# Patient Record
Sex: MALE | Race: WHITE | NOT HISPANIC OR LATINO | Employment: UNEMPLOYED | URBAN - NONMETROPOLITAN AREA
[De-identification: names, ages, dates, MRNs, and addresses within clinical notes are randomized per-mention and may not be internally consistent; named-entity substitution may affect disease eponyms.]

---

## 2019-08-08 ENCOUNTER — APPOINTMENT (OUTPATIENT)
Dept: ULTRASOUND IMAGING | Facility: HOSPITAL | Age: 49
End: 2019-08-08

## 2019-08-08 ENCOUNTER — APPOINTMENT (OUTPATIENT)
Dept: MRI IMAGING | Facility: HOSPITAL | Age: 49
End: 2019-08-08

## 2019-08-08 ENCOUNTER — APPOINTMENT (OUTPATIENT)
Dept: CT IMAGING | Facility: HOSPITAL | Age: 49
End: 2019-08-08

## 2019-08-08 ENCOUNTER — APPOINTMENT (OUTPATIENT)
Dept: CARDIOLOGY | Facility: HOSPITAL | Age: 49
End: 2019-08-08

## 2019-08-08 ENCOUNTER — HOSPITAL ENCOUNTER (INPATIENT)
Facility: HOSPITAL | Age: 49
LOS: 5 days | Discharge: NURSING, REHAB OR LONG-TERM CARE | End: 2019-08-13
Attending: FAMILY MEDICINE | Admitting: FAMILY MEDICINE

## 2019-08-08 DIAGNOSIS — R41.89 COGNITIVE AND BEHAVIORAL CHANGES: Primary | ICD-10-CM

## 2019-08-08 DIAGNOSIS — Z74.09 IMPAIRED FUNCTIONAL MOBILITY, BALANCE, GAIT, AND ENDURANCE: ICD-10-CM

## 2019-08-08 DIAGNOSIS — Z78.9 IMPAIRED MOBILITY AND ADLS: ICD-10-CM

## 2019-08-08 DIAGNOSIS — R46.89 COGNITIVE AND BEHAVIORAL CHANGES: Primary | ICD-10-CM

## 2019-08-08 DIAGNOSIS — Z74.09 IMPAIRED MOBILITY AND ADLS: ICD-10-CM

## 2019-08-08 PROBLEM — G81.94 LEFT HEMIPARESIS (HCC): Status: ACTIVE | Noted: 2019-08-08

## 2019-08-08 PROBLEM — I10 BENIGN ESSENTIAL HTN: Status: ACTIVE | Noted: 2019-08-08

## 2019-08-08 PROBLEM — I63.9 CVA (CEREBRAL VASCULAR ACCIDENT) (HCC): Status: ACTIVE | Noted: 2019-08-08

## 2019-08-08 PROBLEM — E78.5 HLD (HYPERLIPIDEMIA): Status: ACTIVE | Noted: 2019-08-08

## 2019-08-08 PROBLEM — Z91.199 MEDICAL NON-COMPLIANCE: Status: ACTIVE | Noted: 2019-08-08

## 2019-08-08 PROBLEM — F10.20 ALCOHOL USE DISORDER, MODERATE, DEPENDENCE (HCC): Status: ACTIVE | Noted: 2019-08-08

## 2019-08-08 LAB
BH CV ECHO MEAS - AO MAX PG (FULL): 4.9 MMHG
BH CV ECHO MEAS - AO MAX PG: 8.2 MMHG
BH CV ECHO MEAS - AO MEAN PG (FULL): 3 MMHG
BH CV ECHO MEAS - AO MEAN PG: 5 MMHG
BH CV ECHO MEAS - AO ROOT AREA (BSA CORRECTED): 1.4
BH CV ECHO MEAS - AO ROOT AREA: 7.5 CM^2
BH CV ECHO MEAS - AO ROOT DIAM: 3.1 CM
BH CV ECHO MEAS - AO V2 MAX: 143 CM/SEC
BH CV ECHO MEAS - AO V2 MEAN: 111 CM/SEC
BH CV ECHO MEAS - AO V2 VTI: 33.3 CM
BH CV ECHO MEAS - AVA(I,A): 2.6 CM^2
BH CV ECHO MEAS - AVA(I,D): 2.6 CM^2
BH CV ECHO MEAS - AVA(V,A): 2.4 CM^2
BH CV ECHO MEAS - AVA(V,D): 2.4 CM^2
BH CV ECHO MEAS - BSA(HAYCOCK): 2.3 M^2
BH CV ECHO MEAS - BSA: 2.1 M^2
BH CV ECHO MEAS - BZI_BMI: 36.2 KILOGRAMS/M^2
BH CV ECHO MEAS - BZI_METRIC_HEIGHT: 170.2 CM
BH CV ECHO MEAS - BZI_METRIC_WEIGHT: 104.8 KG
BH CV ECHO MEAS - EDV(CUBED): 140.6 ML
BH CV ECHO MEAS - EDV(MOD-SP4): 199 ML
BH CV ECHO MEAS - EDV(TEICH): 129.5 ML
BH CV ECHO MEAS - EF(CUBED): 61.6 %
BH CV ECHO MEAS - EF(MOD-SP4): 59.8 %
BH CV ECHO MEAS - EF(TEICH): 52.8 %
BH CV ECHO MEAS - ESV(CUBED): 54 ML
BH CV ECHO MEAS - ESV(MOD-SP4): 79.9 ML
BH CV ECHO MEAS - ESV(TEICH): 61.2 ML
BH CV ECHO MEAS - FS: 27.3 %
BH CV ECHO MEAS - IVS/LVPW: 0.98
BH CV ECHO MEAS - IVSD: 1.6 CM
BH CV ECHO MEAS - LA DIMENSION: 4.9 CM
BH CV ECHO MEAS - LA/AO: 1.6
BH CV ECHO MEAS - LAT PEAK E' VEL: 4.4 CM/SEC
BH CV ECHO MEAS - LV DIASTOLIC VOL/BSA (35-75): 92.6 ML/M^2
BH CV ECHO MEAS - LV MASS(C)D: 378.5 GRAMS
BH CV ECHO MEAS - LV MASS(C)DI: 176.1 GRAMS/M^2
BH CV ECHO MEAS - LV MAX PG: 3.3 MMHG
BH CV ECHO MEAS - LV MEAN PG: 2 MMHG
BH CV ECHO MEAS - LV SYSTOLIC VOL/BSA (12-30): 37.2 ML/M^2
BH CV ECHO MEAS - LV V1 MAX: 90.9 CM/SEC
BH CV ECHO MEAS - LV V1 MEAN: 71.1 CM/SEC
BH CV ECHO MEAS - LV V1 VTI: 22.8 CM
BH CV ECHO MEAS - LVIDD: 5.2 CM
BH CV ECHO MEAS - LVIDS: 3.8 CM
BH CV ECHO MEAS - LVLD AP4: 8.4 CM
BH CV ECHO MEAS - LVLS AP4: 6.4 CM
BH CV ECHO MEAS - LVOT AREA (M): 3.8 CM^2
BH CV ECHO MEAS - LVOT AREA: 3.8 CM^2
BH CV ECHO MEAS - LVOT DIAM: 2.2 CM
BH CV ECHO MEAS - LVPWD: 1.6 CM
BH CV ECHO MEAS - MED PEAK E' VEL: 4.7 CM/SEC
BH CV ECHO MEAS - MR MAX PG: 113.2 MMHG
BH CV ECHO MEAS - MR MAX VEL: 532 CM/SEC
BH CV ECHO MEAS - MR MEAN PG: 80 MMHG
BH CV ECHO MEAS - MR MEAN VEL: 428 CM/SEC
BH CV ECHO MEAS - MR VTI: 227 CM
BH CV ECHO MEAS - MV A MAX VEL: 79.5 CM/SEC
BH CV ECHO MEAS - MV DEC SLOPE: 196 CM/SEC^2
BH CV ECHO MEAS - MV DEC TIME: 0.42 SEC
BH CV ECHO MEAS - MV E MAX VEL: 81.5 CM/SEC
BH CV ECHO MEAS - MV E/A: 1
BH CV ECHO MEAS - RAP SYSTOLE: 10 MMHG
BH CV ECHO MEAS - RVSP: 25.2 MMHG
BH CV ECHO MEAS - SI(AO): 116.7 ML/M^2
BH CV ECHO MEAS - SI(CUBED): 40.3 ML/M^2
BH CV ECHO MEAS - SI(LVOT): 40.3 ML/M^2
BH CV ECHO MEAS - SI(MOD-SP4): 55.4 ML/M^2
BH CV ECHO MEAS - SI(TEICH): 31.8 ML/M^2
BH CV ECHO MEAS - SV(AO): 251 ML
BH CV ECHO MEAS - SV(CUBED): 86.6 ML
BH CV ECHO MEAS - SV(LVOT): 86.7 ML
BH CV ECHO MEAS - SV(MOD-SP4): 119.1 ML
BH CV ECHO MEAS - SV(TEICH): 68.3 ML
BH CV ECHO MEAS - TR MAX VEL: 195 CM/SEC
BH CV ECHO MEASUREMENTS AVERAGE E/E' RATIO: 17.91
LEFT ATRIUM VOLUME INDEX: 41 ML/M2
LEFT ATRIUM VOLUME: 88 CM3
LV EF 2D ECHO EST: 55 %
MAXIMAL PREDICTED HEART RATE: 169 BPM
STRESS TARGET HR: 144 BPM

## 2019-08-08 PROCEDURE — 70551 MRI BRAIN STEM W/O DYE: CPT

## 2019-08-08 PROCEDURE — 25010000002 PERFLUTREN 6.52 MG/ML SUSPENSION: Performed by: FAMILY MEDICINE

## 2019-08-08 PROCEDURE — 93306 TTE W/DOPPLER COMPLETE: CPT

## 2019-08-08 PROCEDURE — 70498 CT ANGIOGRAPHY NECK: CPT

## 2019-08-08 PROCEDURE — 93306 TTE W/DOPPLER COMPLETE: CPT | Performed by: INTERNAL MEDICINE

## 2019-08-08 PROCEDURE — 25010000002 THIAMINE PER 100 MG: Performed by: FAMILY MEDICINE

## 2019-08-08 PROCEDURE — 70496 CT ANGIOGRAPHY HEAD: CPT

## 2019-08-08 PROCEDURE — 0 IOPAMIDOL PER 1 ML: Performed by: FAMILY MEDICINE

## 2019-08-08 RX ORDER — LANOLIN ALCOHOL/MO/W.PET/CERES
3 CREAM (GRAM) TOPICAL NIGHTLY PRN
Status: DISCONTINUED | OUTPATIENT
Start: 2019-08-08 | End: 2019-08-13 | Stop reason: HOSPADM

## 2019-08-08 RX ORDER — LORAZEPAM 2 MG/ML
2 INJECTION INTRAMUSCULAR
Status: DISCONTINUED | OUTPATIENT
Start: 2019-08-08 | End: 2019-08-13 | Stop reason: HOSPADM

## 2019-08-08 RX ORDER — ALUMINA, MAGNESIA, AND SIMETHICONE 2400; 2400; 240 MG/30ML; MG/30ML; MG/30ML
15 SUSPENSION ORAL EVERY 6 HOURS PRN
Status: DISCONTINUED | OUTPATIENT
Start: 2019-08-08 | End: 2019-08-13 | Stop reason: HOSPADM

## 2019-08-08 RX ORDER — LORAZEPAM 1 MG/1
1 TABLET ORAL
Status: DISCONTINUED | OUTPATIENT
Start: 2019-08-08 | End: 2019-08-13 | Stop reason: HOSPADM

## 2019-08-08 RX ORDER — LORAZEPAM 2 MG/ML
1 INJECTION INTRAMUSCULAR
Status: DISCONTINUED | OUTPATIENT
Start: 2019-08-08 | End: 2019-08-13 | Stop reason: HOSPADM

## 2019-08-08 RX ORDER — ASPIRIN 81 MG/1
81 TABLET, CHEWABLE ORAL DAILY
Status: DISCONTINUED | OUTPATIENT
Start: 2019-08-08 | End: 2019-08-13 | Stop reason: HOSPADM

## 2019-08-08 RX ORDER — ASPIRIN 300 MG/1
300 SUPPOSITORY RECTAL DAILY
Status: DISCONTINUED | OUTPATIENT
Start: 2019-08-08 | End: 2019-08-13 | Stop reason: HOSPADM

## 2019-08-08 RX ORDER — TRAMADOL HYDROCHLORIDE 50 MG/1
50 TABLET ORAL EVERY 6 HOURS PRN
Status: DISCONTINUED | OUTPATIENT
Start: 2019-08-08 | End: 2019-08-13 | Stop reason: HOSPADM

## 2019-08-08 RX ORDER — SODIUM CHLORIDE 0.9 % (FLUSH) 0.9 %
3-10 SYRINGE (ML) INJECTION AS NEEDED
Status: DISCONTINUED | OUTPATIENT
Start: 2019-08-08 | End: 2019-08-13 | Stop reason: HOSPADM

## 2019-08-08 RX ORDER — ACETAMINOPHEN 325 MG/1
650 TABLET ORAL EVERY 4 HOURS PRN
Status: DISCONTINUED | OUTPATIENT
Start: 2019-08-08 | End: 2019-08-13 | Stop reason: HOSPADM

## 2019-08-08 RX ORDER — ONDANSETRON 2 MG/ML
4 INJECTION INTRAMUSCULAR; INTRAVENOUS EVERY 6 HOURS PRN
Status: DISCONTINUED | OUTPATIENT
Start: 2019-08-08 | End: 2019-08-13 | Stop reason: HOSPADM

## 2019-08-08 RX ORDER — SODIUM CHLORIDE 9 MG/ML
75 INJECTION, SOLUTION INTRAVENOUS CONTINUOUS
Status: DISCONTINUED | OUTPATIENT
Start: 2019-08-08 | End: 2019-08-09

## 2019-08-08 RX ORDER — LABETALOL HYDROCHLORIDE 5 MG/ML
20 INJECTION, SOLUTION INTRAVENOUS
Status: DISCONTINUED | OUTPATIENT
Start: 2019-08-08 | End: 2019-08-13 | Stop reason: HOSPADM

## 2019-08-08 RX ORDER — LORAZEPAM 1 MG/1
2 TABLET ORAL
Status: DISCONTINUED | OUTPATIENT
Start: 2019-08-08 | End: 2019-08-13 | Stop reason: HOSPADM

## 2019-08-08 RX ORDER — SODIUM CHLORIDE 0.9 % (FLUSH) 0.9 %
3 SYRINGE (ML) INJECTION EVERY 12 HOURS SCHEDULED
Status: DISCONTINUED | OUTPATIENT
Start: 2019-08-08 | End: 2019-08-13 | Stop reason: HOSPADM

## 2019-08-08 RX ORDER — BISACODYL 5 MG/1
5 TABLET, DELAYED RELEASE ORAL DAILY PRN
Status: DISCONTINUED | OUTPATIENT
Start: 2019-08-08 | End: 2019-08-13 | Stop reason: HOSPADM

## 2019-08-08 RX ORDER — NICOTINE 21 MG/24HR
1 PATCH, TRANSDERMAL 24 HOURS TRANSDERMAL EVERY 24 HOURS
Status: DISCONTINUED | OUTPATIENT
Start: 2019-08-08 | End: 2019-08-13 | Stop reason: HOSPADM

## 2019-08-08 RX ORDER — ATORVASTATIN CALCIUM 40 MG/1
80 TABLET, FILM COATED ORAL NIGHTLY
Status: DISCONTINUED | OUTPATIENT
Start: 2019-08-08 | End: 2019-08-13 | Stop reason: HOSPADM

## 2019-08-08 RX ADMIN — ASPIRIN 81 MG 81 MG: 81 TABLET ORAL at 18:53

## 2019-08-08 RX ADMIN — IOPAMIDOL 142 ML: 755 INJECTION, SOLUTION INTRAVENOUS at 16:55

## 2019-08-08 RX ADMIN — THIAMINE HYDROCHLORIDE 100 MG: 100 INJECTION, SOLUTION INTRAMUSCULAR; INTRAVENOUS at 21:01

## 2019-08-08 RX ADMIN — DESMOPRESSIN ACETATE 80 MG: 0.2 TABLET ORAL at 20:59

## 2019-08-08 RX ADMIN — SODIUM CHLORIDE 500 ML: 9 INJECTION, SOLUTION INTRAVENOUS at 18:53

## 2019-08-08 RX ADMIN — PERFLUTREN 8.48 MG: 6.52 INJECTION, SUSPENSION INTRAVENOUS at 17:48

## 2019-08-08 RX ADMIN — NICOTINE 1 PATCH: 14 PATCH, EXTENDED RELEASE TRANSDERMAL at 20:58

## 2019-08-08 NOTE — PLAN OF CARE
Problem: Patient Care Overview  Goal: Plan of Care Review  Outcome: Ongoing (interventions implemented as appropriate)   08/08/19 0689   Coping/Psychosocial   Plan of Care Reviewed With patient   Plan of Care Review   Progress no change   OTHER   Outcome Summary Patient was a transfer from [place]. Stroke work up. A&O X4. NIH=6. NIH Q 4. CIWA=3. Sinus jacquelyn on tele. Up X 2 if necessary. MRI, CT, and echo today. , room air. Passed BSS, regular diet. Will continue to monitor.      Goal: Individualization and Mutuality  Outcome: Ongoing (interventions implemented as appropriate)    Goal: Discharge Needs Assessment  Outcome: Ongoing (interventions implemented as appropriate)    Goal: Interprofessional Rounds/Family Conf  Outcome: Ongoing (interventions implemented as appropriate)      Problem: Stroke (Ischemic) (Adult)  Goal: Signs and Symptoms of Listed Potential Problems Will be Absent, Minimized or Managed (Stroke)  Outcome: Ongoing (interventions implemented as appropriate)

## 2019-08-08 NOTE — CONSULTS
Neurology Consult Note    Patient:  Olman Vaca   YOB: 1970  MRN:  [id number]  Date of Admission:  8/8/2019  2:24 PM    Date: 8/9/2019    Referring Provider: Tay Collado DO     Reason for Consultation: CVA w R hemiparesis      History of present illness:     This is a 49 y.o. right handed male.with H/O hyperlipidemia, hypertension, smoker, evaluated for a stroke    Patient was sent from [facility] for onset of left sided weakness, and dizziness as well as headache    Patient reports that the day before yesterday while lying on the couch he had an abrupt onset of posterior headaches that radiated forward to his vertex.  This was associated with the dizziness which he described more as a lightheadedness and a feeling of near syncope.  He also noted incoordination he now that he his left arm was not working right because he could not reach for the glass.  He noted numbness in the front of his face and numbness in the left arm leg and trunk.  When he got up to walk he was incoordinated but could walk.  He ran into the wall on the left he did end up going back to bed and sleeping he got up yesterday morning at 6 in a.m. and noted that the symptoms were still there came into [facility].  The patient denies any vision loss.    Of note he is not taking any lipid-lowering agents for years.  He states that there is a discontinued medical card so he has not been able to get any of his medications filled for the past few months so has not been on any of his antihypertensives    He states he has had vision problems meaning that he needs glasses for a long time.  Today he states he still has a gait and balance that his left leg is not working right his left arm is weak and he continues to have numbness in the front of the face and the left side of his body.    Past Medical History:   Diagnosis Date   • Benign essential HTN 8/8/2019   • HLD (hyperlipidemia) 8/8/2019        Past Surgical History:   Procedure Laterality Date   • FEMORAL LYMPH NODE BIOPSY/EXCISON     • MOUTH SURGERY         Prior to Admission medications    Not on File--NONE       Hospital scheduled medications:     aspirin 81 mg Oral Daily   Or      aspirin 300 mg Rectal Daily   atorvastatin 80 mg Oral Nightly   nicotine 1 patch Transdermal Q24H   sodium chloride 3 mL Intravenous Q12H   thiamine (VITAMIN B1) IVPB 100 mg Intravenous Daily     Hospital PRN medications:  •  acetaminophen  •  aluminum-magnesium hydroxide-simethicone  •  bisacodyl  •  labetalol  •  LORazepam **OR** LORazepam **OR** LORazepam **OR** LORazepam **OR** LORazepam **OR** LORazepam  •  melatonin  •  ondansetron  •  sodium chloride  •  traMADol    No Known Allergies    Social History     Socioeconomic History   • Marital status: Single     Spouse name: Not on file   • Number of children: Not on file   • Years of education: Not on file   • Highest education level: Not on file   Tobacco Use   • Smoking status: Current Every Day Smoker     Packs/day: 1.00     Years: 42.00     Pack years: 42.00     Types: Cigarettes   • Smokeless tobacco: Never Used   Substance and Sexual Activity   • Alcohol use: Yes     Alcohol/week: 42.0 oz     Types: 70 Shots of liquor per week   • Drug use: Yes     Frequency: 7.0 times per week     Types: Marijuana   • Sexual activity: Not Currently     Family History   Problem Relation Age of Onset   • Hypertension Mother    • Cancer Mother    • Stroke Mother    • No Known Problems Father        Review of Systems  A 14 point review of systems was reviewed and was negative except for continued imbalance and left sided numbness and weakness.     Vital Signs   Temp:  [97.5 °F (36.4 °C)-98.3 °F (36.8 °C)] 98.3 °F (36.8 °C)  Heart Rate:  [53-61] 61  Resp:  [16-18] 18  BP: (166-187)/() 187/107    General Exam:  Head:  Normal cephalic, atraumatic  HEENT:  Neck supple  Fundoscopic Exam:  No signs of disc edema  CVS:  Regular  rate and rhythm.  No murmurs  Carotid Examination:  No bruits  Lungs:  Clear to auscultation  Abdomen:  Non-tender, Non-distended  Extremities:  No signs of peripheral edema  Skin:  No rashes    Neurologic Exam:    Mental Status:    -Awake, Alert, Oriented X 3  -No word finding difficulties  -No aphasia  -No dysarthria  -Follows simple and complex commands    CN II:  Visual fields left homonymous hemianopia  Pupils equally reactive to light  CN III, IV, VI:  Extraocular Muscles full with no signs of nystagmus  CN V:  Facial sensory is asymmetric --decreased on the left  CN VII:  Facial motor symmetric  CN VIII:  Gross hearing intact bilaterally  CN IX/X:  Palate elevates symmetrically  CN XI:  Shoulder shrug symmetric  CN XII:  Tongue is midline on protrusion    Motor: (strength out of 5:  1= minimal movement, 2 = movement in plane of gravity, 3 = movement against gravity, 4 = movement against some resistance, 5 = full strength)    -Right Upper Ext: Proximal: 5 Distal: 5  -Left Upper Ext: arm drift and weakness of left shoulder abduction and unable to sustain strength but nearly at 5/5    -Right Lower Ext: Proximal: 5 Distal: 5  -Left Lower Ext: Leg drift Proximal: 5- Distal: 5 but unable to sustain it    DTR:  -Right   Bicep: 2+ Triceps: 2+ Brachioradialis: 2+   Patella: 2+ Ankle: 2+ Neg Babinski  -Left   Bicep: 2+ Triceps: 2+ Brachioradialis: 2+   Patella: 2+ Ankle: 2+ Neg Babinski    Sensory:  -Intact to light touch, pinprick, temperature, pain, and proprioception    Coordination:  -Finger to nose intact except past point on the left  -Heel to shin intact on the right and ataxic on the left  -No ataxia    Gait  -Not tested for safety reasons      Results Review:  Lab Results (last 7 days)     Procedure Component Value Units Date/Time    POC Glucose Once [[id number]]  (Normal) Collected:  08/09/19 1200    Specimen:  Blood Updated:  08/09/19 1212     Glucose 95 mg/dL      Comment: : [id number] Omar  DimpleyMeter ID: [id number]       POC Glucose Once [[id number]]  (Normal) Collected:  08/09/19 0824    Specimen:  Blood Updated:  08/09/19 0840     Glucose 101 mg/dL      Comment: : [id number] Omar MusayMeter ID: [id number]       Hemoglobin A1c [[id number]]  (Normal) Collected:  08/09/19 0437    Specimen:  Blood Updated:  08/09/19 0611     Hemoglobin A1C 5.7 %     Narrative:       Less than 6.0           Non-Diabetic Range  6.0-7.0                 ADA Therapeutic Target  Greater than 7.0        Action Suggested    Lipid Panel [[id number]]  (Abnormal) Collected:  08/09/19 0437    Specimen:  Blood Updated:  08/09/19 0554     Total Cholesterol 207 mg/dL      Triglycerides 161 mg/dL      HDL Cholesterol 38 mg/dL      LDL Cholesterol  133 mg/dL      LDL/HDL Ratio 3.60            Results for orders placed during the hospital encounter of 08/08/19   Adult Transthoracic Echo Complete W/ Cont if Necessary Per Protocol (With Agitated Saline)    Narrative · Left ventricular wall thickness is consistent with moderate concentric   hypertrophy.  · Estimated EF = 55%.  · Left ventricular diastolic dysfunction.  · Left atrial cavity size is mild-to-moderately dilated.  · No evidence of a patent foramen ovale. Saline test results are negative.  · No evidence of pulmonary hypertension is present.          .  Imaging Results (last 24 hours)     Procedure Component Value Units Date/Time    CT Angiogram Head With & Without Contrast [[id number]] Collected:  08/08/19 1927     Updated:  08/08/19 1941    Narrative:       EXAMINATION: CT angiogram of the head with contrast dated 08/20/2019     DOSE: 504 mGycm. Automated exposure control was utilized to diminish  patient radiation dose..     HISTORY: Stroke symptoms     FINDINGS: Multiple contiguous axial images are obtained through the  [place] 1 mm intervals findings contrast ministration with  reformatted images obtained in the sagittal and coronal projections from  the  original data set.     The right vertebral artery is dominant. There is mild plaquing with  associated stenosis of the proximal intracranial aspect of the right  vertebral artery. Both distal vertebral arteries are otherwise widely  patent to the level the basilar artery.     The basilar artery is normal in caliber. Both superior cerebellar  arteries as well as the left posterior cerebral artery are normal in  caliber and appearance. There is some attenuation within the P2 segment  of the right posterior cerebral artery suggesting some underlying  steno-occlusive disease. This is in the same segment as the patient's  acute infarct.     The distal internal carotid arteries are widely patent. There is mild  calcific plaquing involving the cavernous and supraclinoid aspect of  both ICAs without evidence of rate limiting stenosis. There is a  hypoplastic A1 segment of the right anterior cerebral artery. A 6 mm  aneurysm is noted at the right MCA trifurcation. The anterior and middle  cerebral arteries are otherwise widely patent. No additional aneurysms  are identified.       Impression:       1.. Attenuation suggesting steno-occlusive disease of the P2 segment of  the right posterior cerebral artery. There is mild calcific plaquing  involving the proximal intracranial aspect of the right vertebral  artery. The posterior circulation is otherwise unremarkable.  2. 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 There is a hypoplastic A1 segment of the right anterior  cerebral artery. The anterior circulation is otherwise unremarkable.  3. The dural venous sinuses are unremarkable with no evidence of dural  venous sinus thrombosis.  This report was finalized on 08/08/2019 19:38 by Dr. Amado Escalona MD.    CT Angiogram Neck With & Without Contrast [[id number]] Collected:  08/08/19 1810     Updated:  08/08/19 1826    Narrative:       EXAMINATION: CT angiogram of the neck with contrast  08/08/2019     DOSE: 504 mGycm. Automated exposure control was utilized to diminish  patient radiation dose..     HISTORY: Stroke.     FINDINGS: Multiple contiguous axial and obtained from the aortic arch  through the skull base at 1 mm intervals following intravenous contrast  administration with reformatted images obtained in the sagittal and  coronal projections from the original dataset as well as MIPS.     The study is degraded by motion. There is a calcified granuloma within  the right upper lobe. Lung apices are otherwise clear. The superior  mediastinum is unremarkable. There is mild tortuosity of the proximal  great vessels.     The thyroid gland is homogeneous in density without evidence of focal  nodule or mass. No evidence of adenopathy in the supraclavicular fossa.  The level of the true and false cords is remarkable for asymmetric  nodule which appears to be associated with the left vocal fold. This  measures approximately 5 mm in size and direct visualization is  recommended. There are some mildly prominent left posterior triangle  nodes including an 8 mm short axis node. Smaller nodes are noted within  the jugular chain bilaterally. The epiglottis and aryepiglottic folds  are unremarkable. The nasopharynx and parapharyngeal spaces are  unremarkable with no evidence of mass or mass effect.     There is some mild plaquing involving the innominate artery on the  right. The origin of the great vessels are otherwise unremarkable  including the vertebral arteries although again partially obscured  secondary to motion artifact. The right vertebral artery is dominant.     Both vertebral arteries are widely patent in their extracranial aspect.  There is calcific plaquing with mild associated stenosis of the proximal  intracranial segment of the right vertebral artery.     The right common carotid artery is widely patent to the level of the  carotid bifurcation. There is mild calcific plaquing involving  the  carotid bulb and proximal aspect of the ICA. The more distal right ICA  is tortuous. There is very slight narrowing of the proximal segment of  the right ICA but with a less than 20% cross-sectional narrowing. The  more distal right ICA is widely patent.     The left common carotid artery is widely patent. There is mild calcific  plaquing involving the carotid bulb and proximal aspect of the ICA again  resulting in only a very mild stenosis with a less than 20%  cross-sectional narrowing of the lumen of the vessel. The more distal  left ICA is tortuous but otherwise widely patent.       Impression:       1. There is mild calcific plaquing and noncalcific plaque involving the  carotid bulb and proximal aspect of both internal carotid arteries. This  is not resulting in a hemodynamically significant stenosis with a less  than 20% cross-sectional narrowing noted of both ICAs. The more distal  ICAs are tortuous but otherwise widely patent.  2. Minimal calcific plaquing involving the proximal right innominate.  The origin of the great vessels are otherwise unremarkable.  3. Right vertebral artery is dominant. Both vertebral arteries are  widely patent in their extracranial aspect. There is some mild calcific  plaquing with mild associated stenosis of the proximal intracranial  aspect of the right vertebral artery.  4. There is a polypoid lesion which appears to be associated with the  left vocal fold. This would warrant follow-up with direct visualization  following ENT consultation.  This report was finalized on 08/08/2019 18:23 by Dr. Amado Escalona MD.    MRI Brain Without Contrast [[id number]] Collected:  08/08/19 1656     Updated:  08/08/19 1700    Narrative:       EXAM: MR BRAIN WITHOUT IV CONTRAST 08/08/2019     COMPARISON: None      HISTORY: 49 years-old Male. Stroke      TECHNIQUE:   Routine pulse sequences of the brain were obtained without IV contrast.      REPORT:     There is diffusion restriction  within the right thalamus and medial to  the lobe, with associated T2/flair abnormal signal, consistent with  acute right PCA infarct. There is no hemorrhagic conversion.     No acute hydrocephalus. No midline shift. No suspicious extra fluid  collection. Mild chronic microvascular changes. Mild-to-moderate  cerebral volume loss.     Mastoid air cells are clear. The paranasal sinuses are free of  obstructive mucosal disease. The intraorbital structures are  unremarkable. The basal cisterns are preserved.                   Impression:       1.  Acute extensive infarct in the right PCA territory.  This report was finalized on 08/08/2019 16:57 by Dr. Gwen Conley MD.        MRI brain  DWI: Acute right PCA stroke      Telemetry: Sinus/sinus jacquelyn Last night: 52 to 67 Today 55 to 62    Impression  1. Acute right PCA stroke involving  right thalamus.and medial occipital with left homonymous hemianopsia with occlusion of the right PCA--P2 segment.   2. Polypoid lesion  left vocal cord  3. Hyperlipidemia with LDL of 133 and goal of < 70.   4. No hemodynamically significant intracranial nor vascular stenosis of neck vessels  5. No evidence of DM based on hemoglobin A1c of 5.7  6. Right MCA trifurcation aneurysm  7. Cognitive impairment    Plan  1. CT angio ordered stat (tried to se patient yesterday and discussed his situation with Dr Collado. Did review CTA and MRI yesterday and reviewed them again today with the patient.   2. ENT evaluation of left vocal cord polypoid lesion  3. Inpatient rehab.   4. Neurosurgery consultation in regard to aneurysm   5. B12, folate, TSH.   6.  thiamine    I discussed the patients findings and my recommendations with patient, nursing staff and Dr. Tobias Lord MD  08/09/19  12:42 PM

## 2019-08-08 NOTE — H&P
[facility] Services  HISTORY AND PHYSICAL    Date of Admission: 8/8/2019  Primary Care Physician: Gordon Robles MD    Subjective     Chief Complaint:   Dizzy, facial numbness, left arm and leg weakness    History of Present Illness    This 49-year-old white male was admitted with chief complaint of dizziness, facial numbness, left arm and leg weakness with numbnessand inability to appropriately use his left side.  The patient's symptoms began last night at about midnight.  He did not seek assistance until today when he presented to [facility] emergency department for further treatment evaluation.  Patient has a history of essential hypertension and hyperlipidemia for which she has been prescribed medications in the past.  The patient has been noncompliant with treatment and has not taken those medications in years.    Work-up at [facility] as an EKG without significant abnormality, CT scan of the head showing calcification of the right vertebral artery and both carotid arteries but no other acute abnormalities.  Chest x-ray showed heart and upper limits of normal in size otherwise no acute abnormality.  CBC was within normal limits.  Urinalysis with within normal limits.  CMP within normal limits except for glucose of 133.  Cardiac markers were negative.  Brain natruretic peptide was elevated at 1230.    Review of Systems   Constitutional: Positive for activity change.   HENT: Negative.    Eyes: Negative.    Respiratory: Negative.    Cardiovascular: Negative.    Gastrointestinal: Negative.    Endocrine: Negative.    Genitourinary: Negative.    Musculoskeletal: Positive for gait problem.   Skin: Negative.    Allergic/Immunologic: Negative.    Neurological: Positive for dizziness, facial asymmetry (on left), weakness (on right) and numbness (on right).   Hematological: Negative.    Psychiatric/Behavioral: Negative.         Otherwise complete  "ROS reviewed and negative except as mentioned in the HPI.      Past Medical History:     Past Medical History:   Diagnosis Date   • Benign essential HTN 8/8/2019   • HLD (hyperlipidemia) 8/8/2019       Past Surgical History:  Past Surgical History:   Procedure Laterality Date   • FEMORAL LYMPH NODE BIOPSY/EXCISON     • MOUTH SURGERY         Family History:   family history includes Cancer in his mother; Hypertension in his mother; No Known Problems in his father; Stroke in his mother.    Social History:    reports that he has been smoking cigarettes.  He has a 42.00 pack-year smoking history. He has never used smokeless tobacco. He reports that he drinks about 42.0 oz of alcohol per week. He reports that he uses drugs. Drug: Marijuana. Frequency: 7.00 times per week.    Medications:  Prior to Admission medications    Not on File       Allergies:  Allergies not on file    Objective     Vital Signs:   /98 (BP Location: Left arm, Patient Position: Lying)   Pulse 54   Temp 97.9 °F (36.6 °C) (Oral)   Resp 18   Ht 170.2 cm (67\")   Wt 105 kg (231 lb 0.7 oz)   SpO2 95%   BMI 36.19 kg/m²     Physical Exam   Constitutional: He is oriented to person, place, and time. He appears well-developed and well-nourished. He is cooperative. No distress.   During the interview the patient ignored his left side.    HENT:   Head: Normocephalic and atraumatic.   Right Ear: External ear normal.   Left Ear: External ear normal.   Nose: Nose normal.   Mouth/Throat: Oropharynx is clear and moist.   Facial asymmetry with weakness of the left facial muscles.    Eyes: Conjunctivae and EOM are normal. Pupils are equal, round, and reactive to light. No scleral icterus.   Neck: Normal range of motion. Neck supple. No JVD present. Carotid bruit is not present. No tracheal deviation present.   Cardiovascular: Normal rate, regular rhythm, normal heart sounds and intact distal pulses.   No murmur heard.  Pulmonary/Chest: Effort normal. No " respiratory distress. He has wheezes ( Scattered bilateral).   Abdominal: Soft. Bowel sounds are normal. He exhibits no distension and no mass. There is no tenderness.   Musculoskeletal: He exhibits no edema, tenderness or deformity.   Decreased range of motion on left side.   Neurological: He is alert and oriented to person, place, and time. He exhibits abnormal muscle tone (on left). Coordination (on left) abnormal.   Pronator drift on left.  Left facial droop. Defer the remainder of the neurological exam to neurology   Skin: Skin is warm and dry. No rash noted.   Psychiatric: He has a normal mood and affect. His behavior is normal. Judgment and thought content normal.         Results Reviewed:  Lab Results (last 24 hours)     ** No results found for the last 24 hours. **          No results found.   I have personally reviewed and interpreted the radiology studies and ECG obtained at time of admission.     Assessment / Plan      Assessment & Plan  Active Hospital Problems    Diagnosis   • **CVA (cerebral vascular accident) (CMS/HCC)   • Left hemiparesis (CMS/HCC)   • Benign essential HTN   • Medical non-compliance   • HLD (hyperlipidemia)   • Alcohol use disorder, moderate, dependence (CMS/HCC)       PLAN:   Admit to the neurological floor  Cardiac monitoring  Stat MRI scan of the brain without contrast  Stat CT angiogram of the head and neck  Aspirin 81 mg p.o. daily  High-dose statin daily  PT/OT/ST evaluation and treatment  Permissive hypertension  Neurology consultation  SCDs for DVT prophylaxis  Thiamine 100 mg IV daily x3 doses  Ativan per [facility] protocol    Code Status:   No CPR     I discussed the patient's findings and my recommendations with: The patient    Estimated length of stay: Unknown    Tay Collado DO   08/08/19   3:24 PM

## 2019-08-09 PROBLEM — I67.1 BERRY ANEURYSM: Status: ACTIVE | Noted: 2019-08-09

## 2019-08-09 PROBLEM — J38.1 VOCAL CORD POLYP: Status: ACTIVE | Noted: 2019-08-09

## 2019-08-09 LAB
ARTICHOKE IGE QN: 133 MG/DL (ref 0–99)
CHOLEST SERPL-MCNC: 207 MG/DL (ref 130–200)
FOLATE SERPL-MCNC: 10.3 NG/ML (ref 4.78–24.2)
GLUCOSE BLDC GLUCOMTR-MCNC: 101 MG/DL (ref 70–130)
GLUCOSE BLDC GLUCOMTR-MCNC: 95 MG/DL (ref 70–130)
HBA1C MFR BLD: 5.7 % (ref 4.8–5.9)
HDLC SERPL-MCNC: 38 MG/DL
LDLC/HDLC SERPL: 3.6 {RATIO}
TRIGL SERPL-MCNC: 161 MG/DL (ref 0–149)
TSH SERPL DL<=0.05 MIU/L-ACNC: 1.66 MIU/ML (ref 0.47–4.68)
VIT B12 BLD-MCNC: 368 PG/ML (ref 239–931)

## 2019-08-09 PROCEDURE — 97165 OT EVAL LOW COMPLEX 30 MIN: CPT

## 2019-08-09 PROCEDURE — 82962 GLUCOSE BLOOD TEST: CPT

## 2019-08-09 PROCEDURE — 97162 PT EVAL MOD COMPLEX 30 MIN: CPT

## 2019-08-09 PROCEDURE — 82746 ASSAY OF FOLIC ACID SERUM: CPT | Performed by: PSYCHIATRY & NEUROLOGY

## 2019-08-09 PROCEDURE — 99222 1ST HOSP IP/OBS MODERATE 55: CPT | Performed by: NEUROLOGICAL SURGERY

## 2019-08-09 PROCEDURE — 84443 ASSAY THYROID STIM HORMONE: CPT | Performed by: PSYCHIATRY & NEUROLOGY

## 2019-08-09 PROCEDURE — 83036 HEMOGLOBIN GLYCOSYLATED A1C: CPT | Performed by: FAMILY MEDICINE

## 2019-08-09 PROCEDURE — 92523 SPEECH SOUND LANG COMPREHEN: CPT

## 2019-08-09 PROCEDURE — 99223 1ST HOSP IP/OBS HIGH 75: CPT | Performed by: PSYCHIATRY & NEUROLOGY

## 2019-08-09 PROCEDURE — 82607 VITAMIN B-12: CPT | Performed by: PSYCHIATRY & NEUROLOGY

## 2019-08-09 PROCEDURE — 25010000002 THIAMINE PER 100 MG: Performed by: FAMILY MEDICINE

## 2019-08-09 PROCEDURE — 80061 LIPID PANEL: CPT | Performed by: FAMILY MEDICINE

## 2019-08-09 RX ORDER — AMLODIPINE BESYLATE 5 MG/1
5 TABLET ORAL DAILY
Status: ON HOLD | COMMUNITY
End: 2019-08-09 | Stop reason: SDDI

## 2019-08-09 RX ORDER — AMLODIPINE BESYLATE 5 MG/1
5 TABLET ORAL
Status: DISCONTINUED | OUTPATIENT
Start: 2019-08-09 | End: 2019-08-11

## 2019-08-09 RX ORDER — CITALOPRAM 20 MG/1
20 TABLET ORAL DAILY
Status: ON HOLD | COMMUNITY
End: 2019-08-09 | Stop reason: SDDI

## 2019-08-09 RX ORDER — LOSARTAN POTASSIUM AND HYDROCHLOROTHIAZIDE 12.5; 1 MG/1; MG/1
1 TABLET ORAL DAILY
Status: ON HOLD | COMMUNITY
End: 2019-08-09 | Stop reason: SDDI

## 2019-08-09 RX ADMIN — ASPIRIN 81 MG 81 MG: 81 TABLET ORAL at 08:10

## 2019-08-09 RX ADMIN — SODIUM CHLORIDE 75 ML/HR: 9 INJECTION, SOLUTION INTRAVENOUS at 04:13

## 2019-08-09 RX ADMIN — THIAMINE HYDROCHLORIDE 100 MG: 100 INJECTION, SOLUTION INTRAMUSCULAR; INTRAVENOUS at 08:11

## 2019-08-09 RX ADMIN — DESMOPRESSIN ACETATE 80 MG: 0.2 TABLET ORAL at 20:21

## 2019-08-09 RX ADMIN — NICOTINE 1 PATCH: 14 PATCH, EXTENDED RELEASE TRANSDERMAL at 17:09

## 2019-08-09 RX ADMIN — AMLODIPINE BESYLATE 5 MG: 5 TABLET ORAL at 15:04

## 2019-08-09 RX ADMIN — SODIUM CHLORIDE, PRESERVATIVE FREE 3 ML: 5 INJECTION INTRAVENOUS at 08:11

## 2019-08-09 RX ADMIN — ACETAMINOPHEN 650 MG: 325 TABLET, FILM COATED ORAL at 12:40

## 2019-08-09 RX ADMIN — SODIUM CHLORIDE, PRESERVATIVE FREE 3 ML: 5 INJECTION INTRAVENOUS at 20:21

## 2019-08-09 NOTE — PLAN OF CARE
Problem: Patient Care Overview  Goal: Plan of Care Review   08/09/19 1043   Coping/Psychosocial   Plan of Care Reviewed With patient   Plan of Care Review   Progress improving   OTHER   Outcome Summary PT eval completed. Pt was Ox4 and pleasant during session. He performed bed mobility with CGA and OOB with min A x2. He presents with impaired motor planning with ambulating unable to bend L LE with L swing phase, impaired L UE and LE coordination duing tasks, presents with L trunk lean that patient is aware of but needs verbal cues and encouragement to correct, impaired balance, and visual problems with decreased peripheral vision and saccades with fast eye movements laterally. He would benefit from continued skilled therapy to work on improving balance in sitting and standing to reduce fall risk, to work on coordination to with L LE and UE task to make patient more stable during mobility, and to work on midline orientation in sitting and standing. PT recommends inpatient rehab for d/c. If he can't tolerate 3 hours of therapy, SNF for d/c.

## 2019-08-09 NOTE — PROGRESS NOTES
Continued Stay Note   [place]     Patient Name: Olman Vaca  MRN: [id number]  Today's Date: 8/9/2019    Admit Date: 8/8/2019    Discharge Plan     Row Name 08/09/19 1447       Plan    Plan  [facility] (pending bed offer)    Patient/Family in Agreement with Plan  yes    Plan Comments  Pt agrees to [facility].  SW has faxed facesheet and informed SaDona.  SW will follow for bed offer.  CHARLETTE Contreras.         Discharge Codes    No documentation.             CHARLETTE Griffin

## 2019-08-09 NOTE — PLAN OF CARE
Problem: Patient Care Overview  Goal: Plan of Care Review  Outcome: Ongoing (interventions implemented as appropriate)   08/09/19 0401   Coping/Psychosocial   Plan of Care Reviewed With patient   Plan of Care Review   Progress no change   OTHER   Outcome Summary Pt is A&O X4. NIH = 7. No complaints of pain during shift. Running sinus jacquelyn on tele. Voiding in urinal. RA. Safety maintained.       Problem: Stroke (Ischemic) (Adult)  Goal: Signs and Symptoms of Listed Potential Problems Will be Absent, Minimized or Managed (Stroke)  Outcome: Ongoing (interventions implemented as appropriate)      Problem: Pain, Chronic (Adult)  Goal: Identify Related Risk Factors and Signs and Symptoms  Outcome: Ongoing (interventions implemented as appropriate)    Goal: Acceptable Pain/Comfort Level and Functional Ability  Outcome: Ongoing (interventions implemented as appropriate)

## 2019-08-09 NOTE — CONSULTS
Reason for consult intracranial aneurysm    No chief complaint on file.        HPI: This 49-year-old gentleman who was at home On August 8, 2019 when he felt dizzy and started to develop weakness and numbness in the left side.  Is admitted through the emergency room to the stroke service and found to have a right PCA CVA.  Subsequent work-up including CT angiogram of the head and neck revealed a right MCA aneurysm.  There is no sign of hemorrhage or rupture.  Currently the patient complains of no headache.  He says his left upper and lower extremity is still weak and numb.  He denies any visual disturbances.    Review of Systems   Musculoskeletal: Positive for gait problem.   Neurological: Positive for weakness and numbness.   All other systems reviewed and are negative.       Past Medical History:  has a past medical history of Benign essential HTN (8/8/2019) and HLD (hyperlipidemia) (8/8/2019).    Past Surgical History:  has a past surgical history that includes Femoral Lymph Node Biopsy/Excision and Mouth surgery.    Family History: family history includes Cancer in his mother; Hypertension in his mother; No Known Problems in his father; Stroke in his mother.    Social History:  reports that he has been smoking cigarettes.  He has a 42.00 pack-year smoking history. He has never used smokeless tobacco. He reports that he drinks about 42.0 oz of alcohol per week. He reports that he uses drugs. Drug: Marijuana. Frequency: 7.00 times per week.    Allergies: Patient has no known allergies.    Medications: Scheduled Meds:  amLODIPine 5 mg Oral Q24H   aspirin 81 mg Oral Daily   Or      aspirin 300 mg Rectal Daily   atorvastatin 80 mg Oral Nightly   nicotine 1 patch Transdermal Q24H   sodium chloride 3 mL Intravenous Q12H   thiamine (VITAMIN B1) IVPB 100 mg Intravenous Daily     Continuous Infusions:   PRN Meds:.•  acetaminophen  •  aluminum-magnesium hydroxide-simethicone  •  bisacodyl  •  labetalol  •  LORazepam **OR**  "LORazepam **OR** LORazepam **OR** LORazepam **OR** LORazepam **OR** LORazepam  •  melatonin  •  ondansetron  •  sodium chloride  •  traMADol     Objective:  Vital signs: (most recent): Blood pressure 169/97, pulse 60, temperature 98.3 °F (36.8 °C), temperature source Oral, resp. rate 14, height 172 cm (67.72\"), weight 105 kg (231 lb 7.7 oz), SpO2 97 %.    Lungs:  Normal effort.  He is not in respiratory distress.    Heart: Normal rate.  Regular rhythm.    Abdomen: Abdomen is soft and non-distended.        Neurologic Exam     Mental Status   Oriented to person, place, and time.   Attention: normal.   Speech: speech is normal   Level of consciousness: alert  Knowledge: good.     Cranial Nerves   Cranial nerves II through XII intact.     Motor Exam   Muscle bulk: normal  Overall muscle tone: normal  Right arm pronator drift: absent  Left arm pronator drift: present    Strength   Right deltoid: 5/5  Left deltoid: 3/5  Right biceps: 5/5  Left biceps: 3/5  Right triceps: 5/5  Left triceps: 3/5  Right interossei: 5/5  Left interossei: 4/5  Right iliopsoas: 5/5  Left iliopsoas: 4/5  Right quadriceps: 5/5  Left quadriceps: 4/5  Right anterior tibial: 5/5  Left anterior tibial: 4/5  Right posterior tibial: 5/5  Left posterior tibial: 4/5    Sensory Exam   Right arm light touch: normal  Left arm light touch: decreased from elbow  Right leg light touch: normal  Left leg light touch: decreased from knee  Right arm pinprick: normal  Left arm pinprick: decreased from elbow  Right leg pinprick: normal  Left leg pinprick: decreased from knee    Gait, Coordination, and Reflexes     Gait  Gait: (Unsteady gait requires assistance)    Coordination   Romberg: positive  Finger to nose coordination: abnormal  Heel to shin coordination: abnormal  Tandem walking coordination: abnormal    Tremor   Resting tremor: absent  Intention tremor: absent  Action tremor: absent    Reflexes   Reflexes 2+ except as noted. Left upper and lower extremity " ataxia       Vital Signs  Temp:  [97.5 °F (36.4 °C)-98.3 °F (36.8 °C)] 98.3 °F (36.8 °C)  Heart Rate:  [53-61] 60  Resp:  [14-18] 14  BP: (166-187)/() 169/97    Physical Exam   Constitutional: He is oriented to person, place, and time. He appears well-developed and well-nourished.   Cardiovascular: Normal rate, regular rhythm and normal heart sounds.   Pulmonary/Chest: Effort normal and breath sounds normal. No respiratory distress.   Abdominal: Soft. He exhibits no distension. There is no tenderness.   Neurological: He is oriented to person, place, and time. He has an abnormal Finger-Nose-Finger Test, an abnormal Heel to Aguilar Test, an abnormal Romberg Test and an abnormal Tandem Gait Test.   Psychiatric: His speech is normal.       Results Review:   I reviewed the patient's new clinical results.  I reviewed the patient's new imaging results and agree with the interpretation.  I reviewed the patient's other test results and agree with the interpretation          Lab Results (last 24 hours)     Procedure Component Value Units Date/Time    TSH [[id number]]  (Normal) Collected:  08/09/19 0437    Specimen:  Blood Updated:  08/09/19 1820     TSH 1.660 mIU/mL     Vitamin B12 [[id number]] Collected:  08/09/19 0437    Specimen:  Blood Updated:  08/09/19 1740    Folate [[id number]] Collected:  08/09/19 0437    Specimen:  Blood Updated:  08/09/19 1740    POC Glucose Once [[id number]]  (Normal) Collected:  08/09/19 1200    Specimen:  Blood Updated:  08/09/19 1212     Glucose 95 mg/dL      Comment: : [id number] [employer] ID: [id number]       POC Glucose Once [[id number]]  (Normal) Collected:  08/09/19 0824    Specimen:  Blood Updated:  08/09/19 0840     Glucose 101 mg/dL      Comment: : [id number] [employer] ID: [id number]       Hemoglobin A1c [[id number]]  (Normal) Collected:  08/09/19 0437    Specimen:  Blood Updated:  08/09/19 0611     Hemoglobin A1C 5.7 %     Narrative:       Less than 6.0            Non-Diabetic Range  6.0-7.0                 ADA Therapeutic Target  Greater than 7.0        Action Suggested    Lipid Panel [[id number]]  (Abnormal) Collected:  08/09/19 0437    Specimen:  Blood Updated:  08/09/19 0554     Total Cholesterol 207 mg/dL      Triglycerides 161 mg/dL      HDL Cholesterol 38 mg/dL      LDL Cholesterol  133 mg/dL      LDL/HDL Ratio 3.60          CVA (cerebral vascular accident) (CMS/HCC)    Left hemiparesis (CMS/HCC)    Benign essential HTN    Medical non-compliance    HLD (hyperlipidemia)    Alcohol use disorder, moderate, dependence (CMS/HCC)    Vocal cord polyp    Berry aneurysm      Assessment/Plan:   Imaging demonstrates a 6 mm x 5 mm right MCA aneurysm.  This is unruptured.  This is an incidental finding.  ISU IA standards dictate aneurysms under 7 mm the risk of treatment exceeds the risk of conservative care.  I am to discuss this case and showed the pictures to our endovascular colleagues at the [facility] to see if they would recommend treating this aneurysm in the context of his ischemic right PCA CVA.  Otherwise I recommend maintaining blood pressures less than 160 systolic and I encouraged smoking cessation.  I discussed this at length with the patient.  He acknowledged understand this.  His questions and concerns were addressed.    I discussed the patients findings and my recommendations with patient    Esequiel Encinas MD  08/09/19  6:46 PM    Time: More than 50% of time spent in counseling and coordination of care:  Total face-to-face/floor time 60 min.  Time spent in counseling 30 min. Counseling included the following topics: Diagnosis, condition, treatment, and plan

## 2019-08-09 NOTE — PROGRESS NOTES
[facility] Medicine Services  INPATIENT PROGRESS NOTE    Length of Stay: 1  Date of Admission: 8/8/2019  Primary Care Physician: Gordon Robles MD    Subjective     Chief Complaint:     Dizzy, facial numbness, left arm and leg weakness    HPI     There has been little change in the patient's left-sided deficit.  MRI scan of the brain shows an extensive infarct in the right PCA territory.  CT angiogram of the neck shows a hemodynamically insignificant stenosis of both ICAs at 20%.  There was a polypoid lesion associated with the left vocal fold.  ENT will be consulted to evaluate the lesion further.  CT angiogram of the head shows attenuation suggesting steno-occlusive disease of the P2 segment of the right posterior cerebral artery.  A 6 mm laterally projecting.  Aneurysm at the right MCA trifurcation it was noted.  I will ask neurosurgery to further evaluate that abnormality and make recommendations.  PT and OT have evaluated the patient and have recommended acute, inpatient rehabilitation.  Acute rehab referral will be made.    Typically hypertension is permitted post CVA, but given the presence of a right MCA berry aneurysm, antihypertensive medication will be initiated today.    Review of Systems     All pertinent negatives and positives are as above. All other systems have been reviewed and are negative unless otherwise stated.     Objective    Temp:  [97.5 °F (36.4 °C)-98.3 °F (36.8 °C)] 98.3 °F (36.8 °C)  Heart Rate:  [53-61] 61  Resp:  [16-18] 18  BP: (166-187)/() 187/107    Lab Results (last 24 hours)     Procedure Component Value Units Date/Time    POC Glucose Once [[id number]]  (Normal) Collected:  08/09/19 1200    Specimen:  Blood Updated:  08/09/19 1212     Glucose 95 mg/dL      Comment: : [id number] [employer] ID: [id number]       POC Glucose Once [[id number]]  (Normal) Collected:  08/09/19 0824    Specimen:  Blood Updated:  08/09/19 0840      Glucose 101 mg/dL      Comment: : [id number] Omar Lieberman ID: [id number]       Hemoglobin A1c [[id number]]  (Normal) Collected:  08/09/19 0437    Specimen:  Blood Updated:  08/09/19 0611     Hemoglobin A1C 5.7 %     Narrative:       Less than 6.0           Non-Diabetic Range  6.0-7.0                 ADA Therapeutic Target  Greater than 7.0        Action Suggested    Lipid Panel [[id number]]  (Abnormal) Collected:  08/09/19 0437    Specimen:  Blood Updated:  08/09/19 0554     Total Cholesterol 207 mg/dL      Triglycerides 161 mg/dL      HDL Cholesterol 38 mg/dL      LDL Cholesterol  133 mg/dL      LDL/HDL Ratio 3.60          Imaging Results (last 24 hours)     Procedure Component Value Units Date/Time    CT Angiogram Head With & Without Contrast [[id number]] Collected:  08/08/19 1927     Updated:  08/08/19 1941    Narrative:       EXAMINATION: CT angiogram of the head with contrast dated 08/20/2019     DOSE: 504 mGycm. Automated exposure control was utilized to diminish  patient radiation dose..     HISTORY: Stroke symptoms     FINDINGS: Multiple contiguous axial images are obtained through the  [place] 1 mm intervals findings contrast ministration with  reformatted images obtained in the sagittal and coronal projections from  the original data set.     The right vertebral artery is dominant. There is mild plaquing with  associated stenosis of the proximal intracranial aspect of the right  vertebral artery. Both distal vertebral arteries are otherwise widely  patent to the level the basilar artery.     The basilar artery is normal in caliber. Both superior cerebellar  arteries as well as the left posterior cerebral artery are normal in  caliber and appearance. There is some attenuation within the P2 segment  of the right posterior cerebral artery suggesting some underlying  steno-occlusive disease. This is in the same segment as the patient's  acute infarct.     The distal internal carotid arteries  are widely patent. There is mild  calcific plaquing involving the cavernous and supraclinoid aspect of  both ICAs without evidence of rate limiting stenosis. There is a  hypoplastic A1 segment of the right anterior cerebral artery. A 6 mm  aneurysm is noted at the right MCA trifurcation. The anterior and middle  cerebral arteries are otherwise widely patent. No additional aneurysms  are identified.       Impression:       1.. Attenuation suggesting steno-occlusive disease of the P2 segment of  the right posterior cerebral artery. There is mild calcific plaquing  involving the proximal intracranial aspect of the right vertebral  artery. The posterior circulation is otherwise unremarkable.  2. 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 There is a hypoplastic A1 segment of the right anterior  cerebral artery. The anterior circulation is otherwise unremarkable.  3. The dural venous sinuses are unremarkable with no evidence of dural  venous sinus thrombosis.  This report was finalized on 08/08/2019 19:38 by Dr. Amado Escalona MD.    CT Angiogram Neck With & Without Contrast [[id number]] Collected:  08/08/19 1810     Updated:  08/08/19 1826    Narrative:       EXAMINATION: CT angiogram of the neck with contrast 08/08/2019     DOSE: 504 mGycm. Automated exposure control was utilized to diminish  patient radiation dose..     HISTORY: Stroke.     FINDINGS: Multiple contiguous axial and obtained from the aortic arch  through the skull base at 1 mm intervals following intravenous contrast  administration with reformatted images obtained in the sagittal and  coronal projections from the original dataset as well as MIPS.     The study is degraded by motion. There is a calcified granuloma within  the right upper lobe. Lung apices are otherwise clear. The superior  mediastinum is unremarkable. There is mild tortuosity of the proximal  great vessels.     The thyroid gland is homogeneous in density  without evidence of focal  nodule or mass. No evidence of adenopathy in the supraclavicular fossa.  The level of the true and false cords is remarkable for asymmetric  nodule which appears to be associated with the left vocal fold. This  measures approximately 5 mm in size and direct visualization is  recommended. There are some mildly prominent left posterior triangle  nodes including an 8 mm short axis node. Smaller nodes are noted within  the jugular chain bilaterally. The epiglottis and aryepiglottic folds  are unremarkable. The nasopharynx and parapharyngeal spaces are  unremarkable with no evidence of mass or mass effect.     There is some mild plaquing involving the innominate artery on the  right. The origin of the great vessels are otherwise unremarkable  including the vertebral arteries although again partially obscured  secondary to motion artifact. The right vertebral artery is dominant.     Both vertebral arteries are widely patent in their extracranial aspect.  There is calcific plaquing with mild associated stenosis of the proximal  intracranial segment of the right vertebral artery.     The right common carotid artery is widely patent to the level of the  carotid bifurcation. There is mild calcific plaquing involving the  carotid bulb and proximal aspect of the ICA. The more distal right ICA  is tortuous. There is very slight narrowing of the proximal segment of  the right ICA but with a less than 20% cross-sectional narrowing. The  more distal right ICA is widely patent.     The left common carotid artery is widely patent. There is mild calcific  plaquing involving the carotid bulb and proximal aspect of the ICA again  resulting in only a very mild stenosis with a less than 20%  cross-sectional narrowing of the lumen of the vessel. The more distal  left ICA is tortuous but otherwise widely patent.       Impression:       1. There is mild calcific plaquing and noncalcific plaque involving  the  carotid bulb and proximal aspect of both internal carotid arteries. This  is not resulting in a hemodynamically significant stenosis with a less  than 20% cross-sectional narrowing noted of both ICAs. The more distal  ICAs are tortuous but otherwise widely patent.  2. Minimal calcific plaquing involving the proximal right innominate.  The origin of the great vessels are otherwise unremarkable.  3. Right vertebral artery is dominant. Both vertebral arteries are  widely patent in their extracranial aspect. There is some mild calcific  plaquing with mild associated stenosis of the proximal intracranial  aspect of the right vertebral artery.  4. There is a polypoid lesion which appears to be associated with the  left vocal fold. This would warrant follow-up with direct visualization  following ENT consultation.  This report was finalized on 08/08/2019 18:23 by Dr. Amado Escalona MD.    MRI Brain Without Contrast [[id number]] Collected:  08/08/19 1656     Updated:  08/08/19 1700    Narrative:       EXAM: MR BRAIN WITHOUT IV CONTRAST 08/08/2019     COMPARISON: None      HISTORY: 49 years-old Male. Stroke      TECHNIQUE:   Routine pulse sequences of the brain were obtained without IV contrast.      REPORT:     There is diffusion restriction within the right thalamus and medial to  the lobe, with associated T2/flair abnormal signal, consistent with  acute right PCA infarct. There is no hemorrhagic conversion.     No acute hydrocephalus. No midline shift. No suspicious extra fluid  collection. Mild chronic microvascular changes. Mild-to-moderate  cerebral volume loss.     Mastoid air cells are clear. The paranasal sinuses are free of  obstructive mucosal disease. The intraorbital structures are  unremarkable. The basal cisterns are preserved.                   Impression:       1.  Acute extensive infarct in the right PCA territory.  This report was finalized on 08/08/2019 16:57 by Dr. Gwen Conely MD.              Intake/Output Summary (Last 24 hours) at 8/9/2019 1340  Last data filed at 8/9/2019 0900  Gross per 24 hour   Intake 200 ml   Output 1585 ml   Net -1385 ml       Physical Exam    Constitutional: He is oriented to person, place, and time. He appears well-developed and well-nourished. He is cooperative. No distress.   There is improved recognition of his left side today.  HENT:   Head: Normocephalic and atraumatic.   Right Ear: External ear normal.   Left Ear: External ear normal.   Nose: Nose normal.   Mouth/Throat: Oropharynx is clear and moist. Facial asymmetry with weakness of the left facial muscles is improved today.    Eyes: Conjunctivae are normal.  No scleral icterus.   Neck: Normal range of motion. Neck supple. No JVD present.   Cardiovascular: Normal rate, regular rhythm, normal heart sounds and intact distal pulses.   No murmur heard.  Pulmonary/Chest: Effort normal. No respiratory distress.    Abdominal: Soft. Bowel sounds are normal. He exhibits no distension and no mass. There is no tenderness.   Musculoskeletal: He exhibits no edema, tenderness or deformity.   Decreased range of motion on left side with no significant change compared to evaluation yesterday.   Neurological: He is alert and oriented to person, place, and time. He exhibits abnormal muscle tone (on left). Coordination (on left) abnormal. Pronator drift on left persists.   Skin: Skin is warm and dry. No rash noted.   Psychiatric: He has a normal mood and affect. His behavior is normal. Judgment and thought content normal.         Results Review:  I have reviewed the labs, radiology results, and diagnostic studies since my last progress note and made treatment changes reflective of the results.   I have reviewed the current medications.    Assessment/Plan     Active Hospital Problems    Diagnosis   • **CVA (cerebral vascular accident) (CMS/HCC)   • Vocal cord polyp   • Berry aneurysm   • Left hemiparesis (CMS/HCC)   • Benign essential  HTN   • Medical non-compliance   • HLD (hyperlipidemia)   • Alcohol use disorder, moderate, dependence (CMS/HCC)       PLAN:  Amlodipine 5 mg p.o. daily, first dose now   has been requested to make an acute rehab referral  Continue PT/OT  Consult ENT regarding vocal cord polyp  Consult neurosurgery regarding 6 mm right MCA [place] aneurysm    Tay Collado,    08/09/19   1:40 PM

## 2019-08-09 NOTE — THERAPY EVALUATION
Acute Care - Occupational Therapy Initial Evaluation  [facility]     Patient Name: Olman Vaca  : 1970  MRN: [id number]  Today's Date: 2019  Onset of Illness/Injury or Date of Surgery: (P) 19  Date of Referral to OT: 19  Referring Physician: (P) Dr. Collado    Admit Date: 2019       ICD-10-CM ICD-9-CM   1. Impaired functional mobility, balance, gait, and endurance Z74.09 V49.89   2. Impaired mobility and ADLs Z74.09 799.89     Patient Active Problem List   Diagnosis   • Left hemiparesis (CMS/HCC)   • CVA (cerebral vascular accident) (CMS/HCC)   • Benign essential HTN   • Medical non-compliance   • HLD (hyperlipidemia)   • Alcohol use disorder, moderate, dependence (CMS/HCC)     Past Medical History:   Diagnosis Date   • Benign essential HTN 2019   • HLD (hyperlipidemia) 2019     Past Surgical History:   Procedure Laterality Date   • FEMORAL LYMPH NODE BIOPSY/EXCISON     • MOUTH SURGERY            OT ASSESSMENT FLOWSHEET (last 12 hours)      Occupational Therapy Evaluation     Row Name 19 0946 19 0808                OT Evaluation Time/Intention    Subjective Information  complains of;pain;fatigue;numbness  -MW  --       Document Type  evaluation  -MW  --  -MW       Mode of Treatment  occupational therapy  -MW  --  -MW          General Information    Patient Profile Reviewed?  yes  -MW  --  -MW       Onset of Illness/Injury or Date of Surgery  19  -MW  --  -MW       Referring Physician  Dr. Collado  -MW  --  -MW       Patient Observations  alert;cooperative;agree to therapy  -MW  --       Patient/Family Observations  no family present  -MW  --       General Observations of Patient  fowlers,no apparent distress  -MW  --       Prior Level of Function  independent:;all household mobility;community mobility;ADL's;driving  -MW  --       Equipment Currently Used at Home  none  -MW  --       Pertinent History of Current Functional Problem  developed onset dizziness,  facial numbness, LUE/LLE weakness and discoordination 8/7; MRI 8/8 acute extensive infarct R PCA territory  -MW  --  -MW       Existing Precautions/Restrictions  fall  (Significant)   -MW  --       Limitations/Impairments  sensory;visual  -MW  --       Equipment Issued to Patient  walker, front wheeled  -MW  --       Risks Reviewed  patient:;LOB;nausea/vomiting;increased discomfort;dizziness;change in vital signs;increased drainage  -MW  --       Benefits Reviewed  patient:;improve function;increase independence;increase strength;increase balance;decrease pain;decrease risk of DVT;improve skin integrity;increase knowledge  -MW  --       Barriers to Rehab  environmental barriers  -MW  --          Relationship/Environment    Name of Support/Comfort Primary Source  plans to d/c to cousin's home  -MW  --       Lives With  alone  -MW  --          Resource/Environmental Concerns    Current Living Arrangements  home/apartment/condo  -MW  --          Home Main Entrance    Number of Stairs, Main Entrance  three  -MW  --       Stair Railings, Main Entrance  railing on left side (ascending)  -MW  --          Cognitive Assessment/Interventions    Additional Documentation  Cognitive Assessment/Intervention (Group)  -MW  --          Cognitive Assessment/Intervention- PT/OT    Affect/Mental Status (Cognitive)  WNL  -MW  --       Orientation Status (Cognition)  oriented x 4  -MW  --       Follows Commands (Cognition)  WNL  -MW  --       Cognitive Function (Cognitive)  WNL  -MW  --       Personal Safety Interventions  fall prevention program maintained;gait belt;nonskid shoes/slippers when out of bed;supervised activity  -MW  --          Safety Issues, Functional Mobility    Impairments Affecting Function (Mobility)  balance;coordination;motor control;motor planning;postural/trunk control;sensation/sensory awareness;strength;visual/perceptual  -MW  --          Bed Mobility Assessment/Treatment    Bed Mobility Assessment/Treatment   supine-sit  -MW  --       Supine-Sit [place] (Bed Mobility)  contact guard  -MW  --       Assistive Device (Bed Mobility)  bed rails  -MW  --       Comment (Bed Mobility)  clumsy getting to EOB, impaired motor planning for task  -MW  --          Functional Mobility    Functional Mobility- Ind. Level  contact guard assist;minimum assist (75% patient effort);2 person assist required  -MW  --       Functional Mobility- Device  rolling walker  -MW  --       Functional Mobility- Comment  takes side steps at EOB and 5 steps forward and back, difficulty with motor planning of LLE  -MW  --          Transfer Assessment/Treatment    Transfer Assessment/Treatment  sit-stand transfer;stand-sit transfer  -MW  --          Sit-Stand Transfer    Sit-Stand [place] (Transfers)  contact guard;minimum assist (75% patient effort);2 person assist  -MW  --       Assistive Device (Sit-Stand Transfers)  walker, front-wheeled  -MW  --          Stand-Sit Transfer    Stand-Sit [place] (Transfers)  contact guard;minimum assist (75% patient effort);2 person assist  -MW  --       Assistive Device (Stand-Sit Transfers)  walker, front-wheeled  -MW  --          ADL Assessment/Intervention    BADL Assessment/Intervention  lower body dressing;toileting  -MW  --          Lower Body Dressing Assessment/Training    Lower Body Dressing [place] Level  don;doff;socks;supervision  -MW  --       Lower Body Dressing Position  edge of bed sitting  -MW  --       Comment (Lower Body Dressing)  SBA; mild discoordination with BUE for task; 1 instance LOB to L but able to self correct  -MW  --          Toileting Assessment/Training    [place] Level (Toileting)  toileting skills;contact guard assist  -MW  --       Assistive Devices (Toileting)  urinal  -MW  --       Toileting Position  unsupported standing  -MW  --          BADL Safety/Performance    Impairments, BADL Safety/Performance  balance;coordination;motor planning;sensory  awareness;trunk/postural control;visual/perceptual  -MW  --          General ROM    GENERAL ROM COMMENTS  BUE AROM WFL   -MW  --          MMT (Manual Muscle Testing)    General MMT Comments  RUE 4+/5, LUE grossly 4-/5  -MW  --          Motor Assessment/Interventions    Additional Documentation  Balance (Group);Fine Motor Testing & Training (Group);Gross Motor Coordination (Group)  -MW  --          Gross Motor Coordination    Gross Motor Impairments  AROM (active range of motion);finger to nose;rapid alternating  -MW  --       Gross Motor Skill, Impairments Detail  AROM impaired, lags compared to RUE; ataxic LUE FTN; LUE impaired ED compared to RUE  -MW  --          Balance    Balance  static sitting balance;static standing balance;dynamic sitting balance  -MW  --          Static Sitting Balance    Level of [place] (Unsupported Sitting, Static Balance)  supervision  -MW  --       Sitting Position (Unsupported Sitting, Static Balance)  sitting on edge of bed  -MW  --          Dynamic Sitting Balance    Level of [place], Reaches Outside Midline (Sitting, Dynamic Balance)  standby assist;minimal assist, 75% patient effort  -MW  --       Sitting Position, Reaches Outside Midline (Sitting, Dynamic Balance)  sitting on edge of bed  -MW  --       Comment, Reaches Outside Midline (Sitting, Dynamic Balance)  1 instance LOB during ADL task  -MW  --          Static Standing Balance    Level of [place] (Supported Standing, Static Balance)  contact guard assist  -MW  --       Assistive Device Utilized (Supported Standing, Static Balance)  walker, rolling  -MW  --       Level of [place] (Unsupported Standing, Static Balance)  minimal assist, 75% patient effort;contact guard assist;2 person assist  -MW  --          Fine Motor Testing & Training    Comment, Fine Motor Coordination  opposition intact B, L requires increased time for accuracy  -MW  --          Sensory Assessment/Intervention    Sensory General  Assessment  light touch sensation deficits identified;pain sensation deficits identified  -MW  --          Light Touch Sensation Assessment    Comment, Left Upper Extremity: Light Touch Sensation Assessment  hypersensitivity L upper arm  -MW  --          Pain Sensation Assessment    Comment, Left Upper Extremity: Pain Sensation Assessment  hypersensitivity to pain above elbow  -MW  --          Vision Assessment/Intervention    Visual Impairment/Limitations  blurry vision;peripheral vision impaired left  -MW  --       Visual Motor Impairment  saccades  -MW  --          Positioning and Restraints    Pre-Treatment Position  in bed  -MW  --       Post Treatment Position  chair  -MW  --       In Chair  sitting;call light within reach;encouraged to call for assist  -MW  --          Pain Assessment    Additional Documentation  Pain Scale: Numbers Pre/Post-Treatment (Group)  -MW  --          Pain Scale: Numbers Pre/Post-Treatment    Pain Scale: Numbers, Pretreatment  2/10  -MW  --       Pain Scale: Numbers, Post-Treatment  3/10  -MW  --       Pain Location  head  -MW  --       Pain Intervention(s)  Repositioned;Ambulation/increased activity  -MW  --          Plan of Care Review    Plan of Care Reviewed With  patient  -MW  --          Clinical Impression (OT)    Date of Referral to OT  08/08/19  -MW  --       OT Diagnosis  decreased ADL  -MW  --       Prognosis (OT Eval)  good  -MW  --       Patient/Family Goals Statement (OT Eval)  increase balance and decrease fall risk  -MW  --       Criteria for Skilled Therapeutic Interventions Met (OT Eval)  yes;treatment indicated  -MW  --       Rehab Potential (OT Eval)  good, to achieve stated therapy goals  -MW  --       Therapy Frequency (OT Eval)  5 times/wk  -MW  --       Predicted Duration of Therapy Intervention (Therapy Eval)  until d/c  -MW  --       Care Plan Review (OT)  evaluation/treatment results reviewed;care plan/treatment goals reviewed;risks/benefits  reviewed;patient/other agree to care plan;current/potential barriers reviewed  -MW  --       Anticipated Discharge Disposition (OT)  inpatient rehabilitation facility  -MW  --          Planned OT Interventions    Planned Therapy Interventions (OT Eval)  activity tolerance training;BADL retraining;functional balance retraining;occupation/activity based interventions;neuromuscular control/coordination retraining;patient/caregiver education/training;transfer/mobility retraining  -MW  --          OT Goals    Transfer Goal Selection (OT)  transfer, OT goal 1  -MW  --       Balance Goal Selection (OT)  balance, OT goal 1  -MW  --       Coordination Goal Selection (OT)  coordination, OT goal 1  -MW  --       Additional Documentation  Balance Goal Selection (OT) (Row);Coordination Goal Selection (OT) (Row)  -MW  --          Transfer Goal 1 (OT)    Activity/Assistive Device (Transfer Goal 1, OT)  sit-to-stand/stand-to-sit;bed-to-chair/chair-to-bed;toilet  -MW  --       [place] Level/Cues Needed (Transfer Goal 1, OT)  contact guard assist  -MW  --       Time Frame (Transfer Goal 1, OT)  long term goal (LTG);by discharge  -MW  --       Progress/Outcome (Transfer Goal 1, OT)  goal ongoing  -MW  --          Balance Goal 1 (OT)    Activity/Assistive Device (Balance Goal 1, OT)  sitting, dynamic;standing, static;standing, dynamic in context of 8 min ADL/functional reaching task  -MW  --       [place] Level/Cues Needed (Balance Goal 1, OT)  contact guard assist  -MW  --       Time Frame (Balance Goal 1, OT)  long term goal (LTG);by discharge  -  --       Progress/Outcomes (Balance Goal 1, OT)  goal ongoing  -MW  --          Coordination Goal 1 (OT)    Activity/Assistive Device (Coordination Goal 1, OT)  FM task;GM task;FM written ex program;GM written ex program  -  --       [place] Level/Cues Needed (Coordination Goal 1, OT)  standby assist  -MW  --       Time Frame (Coordination Goal 1, OT)  long term goal  (LTG);by discharge  -  --       Progress/Outcomes (Coordination Goal 1, OT)  goal ongoing  -  --          Living Environment    Home Accessibility  stairs to enter home  -  --         User Key  (r) = Recorded By, (t) = Taken By, (c) = Cosigned By    Initials Name Effective Dates     Yani Mcgowan, OTR/L 08/28/18 -          Occupational Therapy Education     Title: PT OT SLP Therapies (In Progress)     Topic: Occupational Therapy (Done)     Point: ADL training (Done)     Description: Instruct learner(s) on proper safety adaptation and remediation techniques during self care or transfers.   Instruct in proper use of assistive devices.    Learning Progress Summary           Patient Acceptance, E, VU by MAGDIEL at 8/9/2019 11:19 AM    Comment:  ADL, transfer training, bed mobility, balance and safety to reduce fall risk, benefit of use of RW for stability, benefit of continued rehab at d/c                               User Key     Initials Effective Dates Name Provider Type Discipline     08/28/18 -  Yani Mcgowan, OTR/L Occupational Therapist OT                  OT Recommendation and Plan  Outcome Summary/Treatment Plan (OT)  Anticipated Discharge Disposition (OT): inpatient rehabilitation facility  Planned Therapy Interventions (OT Eval): activity tolerance training, BADL retraining, functional balance retraining, occupation/activity based interventions, neuromuscular control/coordination retraining, patient/caregiver education/training, transfer/mobility retraining  Therapy Frequency (OT Eval): 5 times/wk  Plan of Care Review  Plan of Care Reviewed With: patient  Plan of Care Reviewed With: patient  Outcome Summary: OT eval completed. Pt demos decreased FMC/GMC and strength LUE compared to RUE. CGA-Alia for LB dressing task w 1 instance LOB to L in sitting. Pt stands CGA-minAx2, demos L and posterior lean in standing. Balance improves with use of RW but demos decreased motor planning LLE with taking steps  near bed. Pt with hypersensitivity to light touch and pain sensation posterior upper arm. Pt reports blurry vision, saccades present with tracking. Skilled OT required to address stated deficits to increase pt independence and safety to reduce fall risk. Recommend d/c to inpatient rehab.    Outcome Measures     Row Name 08/09/19 1100 08/09/19 1000          How much help from another person do you currently need...    Turning from your back to your side while in flat bed without using bedrails?  --  3  (Pended)   -AL     Moving from lying on back to sitting on the side of a flat bed without bedrails?  --  3  (Pended)   -AL     Moving to and from a bed to a chair (including a wheelchair)?  --  3  (Pended)   -AL     Standing up from a chair using your arms (e.g., wheelchair, bedside chair)?  --  3  (Pended)   -AL     Climbing 3-5 steps with a railing?  --  1  (Pended)   -AL     To walk in hospital room?  --  2  (Pended)   -AL     AM-PAC 6 Clicks Score (PT)  --  15  (Pended)   -MW (r) AL (t)        How much help from another is currently needed...    Putting on and taking off regular lower body clothing?  3  -MW  --     Bathing (including washing, rinsing, and drying)  2  -MW  --     Toileting (which includes using toilet bed pan or urinal)  3  -MW  --     Putting on and taking off regular upper body clothing  3  -MW  --     Taking care of personal grooming (such as brushing teeth)  3  -MW  --     Eating meals  3  -MW  --     AM-PAC 6 Clicks Score (OT)  17  -MW  --        Modified [place] Scale    Modified [place] Scale  --  4 - Moderately severe disability.  Unable to walk without assistance, and unable to attend to own bodily needs without assistance.  (Pended)   -AL        Functional Assessment    Outcome Measure Options  AM-PAC 6 Clicks Daily Activity (OT)  -MW  AM-PAC 6 Clicks Basic Mobility (PT);Modified [place]  (Pended)   -AL       User Key  (r) = Recorded By, (t) = Taken By, (c) = Cosigned By    Initials Name  Provider Type    Yani Roldan, OTR/L Occupational Therapist    AL Hernan Rodriguez, PT Student PT Student          Time Calculation:   Time Calculation- OT     Row Name 08/09/19 1119             Time Calculation- OT    OT Start Time  0946 +12 min chart review  -MW      OT Stop Time  1020  -MW      OT Time Calculation (min)  34 min  -MW      OT Received On  08/09/19  -MW      OT Goal Re-Cert Due Date  08/19/19  -        User Key  (r) = Recorded By, (t) = Taken By, (c) = Cosigned By    Initials Name Provider Type    Yani Roldan, OTR/L Occupational Therapist        Therapy Charges for Today     Code Description Service Date Service Provider Modifiers Qty    [id number] HC OT EVAL LOW COMPLEXITY 3 8/9/2019 Yani Mcgowan, OTR/L GO 1               ALFRED Velazquez/BAYLEE  8/9/2019

## 2019-08-09 NOTE — THERAPY EVALUATION
Acute Care - Physical Therapy Initial Evaluation  [facility]     Patient Name: Olman Vaca  : 1970  MRN: [id number]  Today's Date: 2019   Onset of Illness/Injury or Date of Surgery: 19  Date of Referral to PT: 19  Referring Physician: Dr. Collado      Admit Date: 2019    Visit Dx:     ICD-10-CM ICD-9-CM   1. Impaired functional mobility, balance, gait, and endurance Z74.09 V49.89   2. Impaired mobility and ADLs Z74.09 799.89     Patient Active Problem List   Diagnosis   • Left hemiparesis (CMS/HCC)   • CVA (cerebral vascular accident) (CMS/HCC)   • Benign essential HTN   • Medical non-compliance   • HLD (hyperlipidemia)   • Alcohol use disorder, moderate, dependence (CMS/HCC)     Past Medical History:   Diagnosis Date   • Benign essential HTN 2019   • HLD (hyperlipidemia) 2019     Past Surgical History:   Procedure Laterality Date   • FEMORAL LYMPH NODE BIOPSY/EXCISON     • MOUTH SURGERY          PT ASSESSMENT (last 12 hours)      Physical Therapy Evaluation     Row Name 19 0947          PT Evaluation Time/Intention    Subjective Information  complains of;numbness;pain;fatigue  -MS (r) AL (t) MS (c)     Document Type  evaluation  -MS (r) AL (t) MS (c)     Mode of Treatment  concurrent therapy;physical therapy  -MS (r) AL (t) MS (c)     Row Name 19 0947          General Information    Patient Profile Reviewed?  yes  -MS (r) AL (t) MS (c)     Onset of Illness/Injury or Date of Surgery  19  -MS (r) AL (t) MS (c)     Referring Physician  Dr. Collado  -MS (r) AL (t) MS (c)     Patient Observations  alert;cooperative;agree to therapy  -MS (r) AL (t) MS (c)     Patient/Family Observations  no family present  -MS (r) AL (t) MS (c)     General Observations of Patient  Pt in milian's position on RA in no apparant distress  -MS (r) AL (t) MS (c)     Prior Level of Function  independent:;all household mobility;community mobility;gait;transfer;bed  mobility;grooming;feeding;dressing;driving  -MS (r) AL (t) MS (c)     Equipment Currently Used at Home  none  -MS (r) AL (t) MS (c)     Pertinent History of Current Functional Problem  developed onset dizziness, facial numbness, LUE/LLE weakness and discoordination 8/7; MRI 8/8 acute extensive infarct R PCA territory  -MS (r) AL (t) MS (c)     Existing Precautions/Restrictions  fall  -MS (r) AL (t) MS (c)     Limitations/Impairments  sensory;visual  -MS (r) AL (t) MS (c)     Equipment Issued to Patient  walker, front wheeled  -MS (r) AL (t) MS (c)     Risks Reviewed  patient:;LOB;nausea/vomiting;dizziness;increased discomfort  -MS (r) AL (t) MS (c)     Benefits Reviewed  patient:;improve function;increase balance;increase strength;decrease pain;improve skin integrity;increase knowledge  -MS (r) AL (t) MS (c)     Barriers to Rehab  none identified  -MS (r) AL (t) MS (c)     Row Name 08/09/19 0997          Relationship/Environment    Lives With  alone  -MS (r) AL (t) MS (c)     Family Caregiver if Needed  other (see comments) Cousin and his wife  -MS (r) AL (t) MS (c)     Row Name 08/09/19 0963          Resource/Environmental Concerns    Current Living Arrangements  home/apartment/condo  -MS (r) AL (t) MS (c)     Row Name 08/09/19 0913          Living Environment    Home Accessibility  stairs to enter home;tub/shower is not walk in  -MS (r) AL (t) MS (c)     Row Name 08/09/19 0957          Home Main Entrance    Number of Stairs, Main Entrance  three  -MS (r) AL (t) MS (c)     Stair Railings, Main Entrance  railing on left side (ascending)  -MS (r) AL (t) MS (c)     Row Name 08/09/19 0963          Cognitive Assessment/Interventions    Additional Documentation  Cognitive Assessment/Intervention (Group)  -MS (r) AL (t) MS (c)     Row Name 08/09/19 0982          Cognitive Assessment/Intervention- PT/OT    Affect/Mental Status (Cognitive)  WNL  -MS (r) AL (t) MS (c)     Orientation Status (Cognition)  oriented x 4  -MS (r)  AL (t) MS (c)     Follows Commands (Cognition)  WNL  -MS (r) AL (t) MS (c)     Cognitive Function (Cognitive)  WNL  -MS (r) AL (t) MS (c)     Personal Safety Interventions  fall prevention program maintained;gait belt;nonskid shoes/slippers when out of bed;supervised activity  -MS (r) AL (t) MS (c)     Row Name 08/09/19 0947          Safety Issues, Functional Mobility    Impairments Affecting Function (Mobility)  balance;coordination;motor planning;strength;pain;endurance/activity tolerance  -MS (r) AL (t) MS (c)     Row Name 08/09/19 0947          Bed Mobility Assessment/Treatment    Bed Mobility Assessment/Treatment  supine-sit  -MS (r) AL (t) MS (c)     Supine-Sit [place] (Bed Mobility)  contact guard  -MS (r) AL (t) MS (c)     Assistive Device (Bed Mobility)  bed rails;head of bed elevated  -MS (r) AL (t) MS (c)     Row Name 08/09/19 0947          Transfer Assessment/Treatment    Transfer Assessment/Treatment  sit-stand transfer;stand-sit transfer;bed-chair transfer  -MS (r) AL (t) MS (c)     Comment (Transfers)  Pt leans mild-moderately to the L but able to correct with verbal cues for 10 seconds. Able to stand without a RW but more unsteady.   -MS (r) AL (t) MS (c)     Bed-Chair [place] (Transfers)  minimum assist (75% patient effort);contact guard;2 person assist  -MS (r) AL (t) MS (c)     Sit-Stand [place] (Transfers)  contact guard;minimum assist (75% patient effort);2 person assist  -MS (r) AL (t) MS (c)     Stand-Sit [place] (Transfers)  contact guard;minimum assist (75% patient effort);2 person assist  -MS (r) AL (t) MS (c)     Row Name 08/09/19 0947          Sit-Stand Transfer    Assistive Device (Sit-Stand Transfers)  walker, front-wheeled  -MS (r) AL (t) MS (c)     Row Name 08/09/19 0947          Stand-Sit Transfer    Assistive Device (Stand-Sit Transfers)  walker, front-wheeled  -MS (r) AL (t) MS (c)     Row Name 08/09/19 0947          Gait/Stairs Assessment/Training     [place] Level (Gait)  minimum assist (75% patient effort);2 person assist  -MS (r) AL (t) MS (c)     Assistive Device (Gait)  walker, front-wheeled;other (see comments) Side stepped without RW  -MS (r) AL (t) MS (c)     Distance in Feet (Gait)  Pt able to take 5 steps left and right and 8 steps forward/backwards  -MS (r) AL (t) MS (c)     Deviations/Abnormal Patterns (Gait)  left sided deviations;base of support, narrow  -MS (r) AL (t) MS (c)     Bilateral Gait Deviations  leans left;lateral trunk flexion  -MS (r) AL (t) MS (c)     Comment (Gait/Stairs)  Pt had L lean needing verbal cues to remain in midline. During ambulation, pt had impaired motor planning unable to bend L LE when advancing foot in swing phase keeping a stiff straight L LE with only flexing at the hip.   -MS (r) AL (t) MS (c)     Row Name 08/09/19 0947          General ROM    GENERAL ROM COMMENTS  B UE and LE AROM WFL  -MS (r) AL (t) MS (c)     Row Name 08/09/19 0947          MMT (Manual Muscle Testing)    General MMT Comments  R LE gross strength 5/5, L LE 4+/5, R UE 4+/5, L UE grossly 4-/5  -MS (r) AL (t) MS (c)     Row Name 08/09/19 0947          Motor Assessment/Intervention    Additional Documentation  Balance (Group);Gross Motor Coordination (Group)  -MS (r) AL (t) MS (c)     Row Name 08/09/19 0947          Gross Motor Coordination    Gross Motor Impairments  coordination;finger to nose;motor control;rapid alternating  -MS (r) AL (t) MS (c)     Gross Motor Skill, Impairments Detail  Pt had diminished B achilles and R bicep and brachioradialis reflexes compared to other reflexes tested which were more brisk. Pt had impaired L UE ED and finger to nose and L LE heel to knee for coordination. Impaired L hand opposition.    -MS (r) AL (t) MS (c)     Row Name 08/09/19 0947          Balance    Balance  static sitting balance;static standing balance;dynamic sitting balance  -MS (r) AL (t) MS (c)     Row Name 08/09/19 0947          Static  Sitting Balance    Level of [place] (Unsupported Sitting, Static Balance)  supervision  -MS (r) AL (t) MS (c)     Sitting Position (Unsupported Sitting, Static Balance)  sitting on edge of bed  -MS (r) AL (t) MS (c)     Time Able to Maintain Position (Unsupported Sitting, Static Balance)  45 to 60 seconds  -MS (r) AL (t) MS (c)     Level of [place] (Supported Sitting, Static Balance)  independent  -MS (r) AL (t) MS (c)     Sitting Position (Supported Sitting, Static Balance)  sitting in chair  -MS (r) AL (t) MS (c)     Time Able to Maintain Position (Supported Sitting, Static Balance)  2 to 3 minutes  -MS (r) AL (t) MS (c)     Row Name 08/09/19 0947          Dynamic Sitting Balance    Level of [place], Reaches Outside Midline (Sitting, Dynamic Balance)  minimal assist, 75% patient effort  -MS (r) AL (t) MS (c)     Sitting Position, Reaches Outside Midline (Sitting, Dynamic Balance)  sitting on edge of bed  -MS (r) AL (t) MS (c)     Comment, Reaches Outside Midline (Sitting, Dynamic Balance)  Difficulty reaching down, above, and laterally with Posterior LOB  -MS (r) AL (t) MS (c)     Row Name 08/09/19 0947          Static Standing Balance    Level of [place] (Supported Standing, Static Balance)  contact guard assist  -MS (r) AL (t) MS (c)     Time Able to Maintain Position (Supported Standing, Static Balance)  45 to 60 seconds  -MS (r) AL (t) MS (c)     Assistive Device Utilized (Supported Standing, Static Balance)  walker, rolling  -MS (r) AL (t) MS (c)     Level of [place] (Unsupported Standing, Static Balance)  minimal assist, 75% patient effort;contact guard assist;2 person assist  -MS (r) AL (t) MS (c)     Time Able to Maintain Position (Unsupported Standing, Static Balance)  30 to 45 seconds  -MS (r) AL (t) MS (c)     Comment (Unsupported Standing, Static Balance)  Pt able to stand with feet hips with apart and HHAx1 with CGA with EC, needed min A with EC. He needing min A to bring feet  together and remain upright for 10 seconds and Alia x2 with EC.   -MS (r) AL (t) MS (c)     Row Name 08/09/19 0947          Sensory Assessment/Intervention    Sensory General Assessment  light touch sensation deficits identified;pain sensation deficits identified  -MS (r) AL (t) MS (c)     Row Name 08/09/19 0947          Light Touch Sensation Assessment    Additional Details: Light Touch Sensation Assessment  L thigh had decreased sensation and L UE was more sensitive compared to R UE.   -MS (r) AL (t) MS (c)     Row Name 08/09/19 09          Pain Sensation Assessment    Additional Comments: Pain Sensation Assessment  L posterior upper arm mild impairment with sensing pinching vs stroking  -MS (r) AL (t) MS (c)     Row Name 08/09/19 0947          Vision Assessment/Intervention    Visual Impairment/Limitations  blurry vision  -MS (r) AL (t) MS (c)     Visual Motor Impairment  saccades  -MS (r) AL (t) MS (c)     Vision Assessment Comment  Pt had decrease peripheral vision on left lacking 30 degrees of vision and 10 degrees on R  -MS (r) AL (t) MS (c)     [facility] Name 08/09/19 09          Pain Assessment    Additional Documentation  Pain Scale: Numbers Pre/Post-Treatment (Group)  -MS (r) AL (t) MS (c)     Row Name 08/09/19 0934          Pain Scale: Numbers Pre/Post-Treatment    Pain Scale: Numbers, Pretreatment  2/10  -MS (r) AL (t) MS (c)     Pain Scale: Numbers, Post-Treatment  3/10  -MS (r) AL (t) MS (c)     Pain Location  head  -MS (r) AL (t) MS (c)     Pain Intervention(s)  Repositioned;Ambulation/increased activity  -MS (r) AL (t) MS (c)     Row Name 08/09/19 0947          Plan of Care Review    Plan of Care Reviewed With  patient  -MS (r) AL (t) MS (c)     Row Name 08/09/19 0947          Physical Therapy Clinical Impression    Date of Referral to PT  08/08/19  -MS (r) AL (t) MS (c)     Patient/Family Goals Statement (PT Clinical Impression)  Pt stated to feel more steady and back to normal  -MS (r) AL (t) MS (c)      Criteria for Skilled Interventions Met (PT Clinical Impression)  yes  -MS (r) AL (t) MS (c)     Pathology/Pathophysiology Noted (Describe Specifically for Each System)  neuromuscular  -MS (r) AL (t) MS (c)     Impairments Found (describe specific impairments)  aerobic capacity/endurance;gait, locomotion, and balance;muscle performance;motor function  -MS (r) AL (t) MS (c)     Rehab Potential (PT Clinical Summary)  good, to achieve stated therapy goals  -MS (r) AL (t) MS (c)     Predicted Duration of Therapy (PT)  until d/c  -MS (r) AL (t) MS (c)     Care Plan Review (PT)  evaluation/treatment results reviewed;care plan/treatment goals reviewed;risks/benefits reviewed;patient/other agree to care plan  -MS (r) AL (t) MS (c)     Row Name 08/09/19 0914          Physical Therapy Goals    Bed Mobility Goal Selection (PT)  bed mobility, PT goal 1  -MS (r) AL (t) MS (c)     Transfer Goal Selection (PT)  transfer, PT goal 1  -MS (r) AL (t) MS (c)     Gait Training Goal Selection (PT)  gait training, PT goal 1  -MS (r) AL (t) MS (c)     Balance Goal Selection (PT)  balance, PT goal 1  -MS (r) AL (t) MS (c)     Additional Documentation  Balance Goal Selection (PT) (Row)  -MS (r) AL (t) MS (c)     Row Name 08/09/19 1707          Bed Mobility Goal 1 (PT)    Activity/Assistive Device (Bed Mobility Goal 1, PT)  sit to supine;supine to sit  -MS (r) AL (t) MS (c)     [place] Level/Cues Needed (Bed Mobility Goal 1, PT)  conditional independence  -MS (r) AL (t) MS (c)     Time Frame (Bed Mobility Goal 1, PT)  long term goal (LTG);by discharge  -MS (r) AL (t) MS (c)     Progress/Outcomes (Bed Mobility Goal 1, PT)  goal ongoing  -MS (r) AL (t) MS (c)     Row Name 08/09/19 7484          Transfer Goal 1 (PT)    Activity/Assistive Device (Transfer Goal 1, PT)  sit-to-stand/stand-to-sit;bed-to-chair/chair-to-bed;walker, rolling  -MS (r) AL (t) MS (c)     [place] Level/Cues Needed (Transfer Goal 1, PT)  supervision required   -MS (r) AL (t) MS (c)     Time Frame (Transfer Goal 1, PT)  long term goal (LTG);by discharge  -MS (r) AL (t) MS (c)     Progress/Outcome (Transfer Goal 1, PT)  goal ongoing  -MS (r) AL (t) MS (c)     Row Name 08/09/19 0973          Gait Training Goal 1 (PT)    Activity/Assistive Device (Gait Training Goal 1, PT)  gait (walking locomotion);assistive device use;backward stepping;decrease fall risk;forward stepping;improve balance and speed;increase endurance/gait distance;normalize weight shifts;sidestepping;turning, left;turning, right;walker, rolling  -MS (r) AL (t) MS (c)     [place] Level (Gait Training Goal 1, PT)  minimum assist (75% or more patient effort);1 person assist  -MS (r) AL (t) MS (c)     Distance (Gait Goal 1, PT)  Pt will ambulate 50ft on even surfaces with improve motor planning of L LE.   -MS (r) AL (t) MS (c)     Time Frame (Gait Training Goal 1, PT)  long term goal (LTG);by discharge  -MS (r) AL (t) MS (c)     Progress/Outcome (Gait Training Goal 1, PT)  goal ongoing  -MS (r) AL (t) MS (c)     Row Name 08/09/19 0916          Balance Goal 1 (PT)    Activity/Assistive Device (Balance Goal 1, PT)  sitting, dynamic;standing, static;standing, dynamic;walker, rolling  -MS (r) AL (t) MS (c)     [place] Level/Cues Needed (Balance Goal 1, PT)  contact guard assist  -MS (r) AL (t) MS (c)     Time Frame (Balance Goal 1, PT)  long term goal (LTG);by discharge  -MS (r) AL (t) MS (c)     Progress/Outcomes (Balance Goal 1, PT)  goal ongoing  -MS (r) AL (t) MS (c)     Row Name 08/09/19 0985          Positioning and Restraints    Pre-Treatment Position  in bed  -MS (r) AL (t) MS (c)     Post Treatment Position  chair  -MS (r) AL (t) MS (c)     In Chair  sitting;call light within reach;encouraged to call for assist  -MS (r) AL (t) MS (c)       User Key  (r) = Recorded By, (t) = Taken By, (c) = Cosigned By    Initials Name Provider Type    Rebekah Wise, PT, DPT, NCS Physical Therapist    MO Rodriguez,  Hernan, PT Student PT Student        Physical Therapy Education     Title: PT OT SLP Therapies (In Progress)     Topic: Physical Therapy (In Progress)     Point: Mobility training (Done)     Learning Progress Summary           Patient Acceptance, SANTY WINTER by AL at 8/9/2019 11:10 AM    Comment:  PT educated use of RW with transfers and gait                   Point: Precautions (Done)     Learning Progress Summary           Patient Acceptance, SANTY WINTER by AL at 8/9/2019 11:10 AM    Comment:  PT educated patient on fall risk and need for assist                               User Key     Initials Effective Dates Name Provider Type Discipline    AL 04/24/19 -  Hernan Rodriguez, PT Student PT Student PT              PT Recommendation and Plan  Anticipated Discharge Disposition (PT): inpatient rehabilitation facility, skilled nursing facility  Planned Therapy Interventions (PT Eval): balance training, bed mobility training, gait training, patient/family education, strengthening, transfer training  Therapy Frequency (PT Clinical Impression): 2 times/day  Outcome Summary/Treatment Plan (PT)  Anticipated Discharge Disposition (PT): inpatient rehabilitation facility, skilled nursing facility  Plan of Care Reviewed With: patient  Progress: improving  Outcome Summary: PT eval completed. Pt was Ox4 and pleasant during session. He performed bed mobility with CGA and OOB with min A x2. He presents with impaired motor planning with ambulating unable to bend L LE with L swing phase, impaired L UE and LE coordination duing tasks, presents with L trunk lean that patient is aware of but needs verbal cues and encouragement to correct, impaired balance, and visual problems with decreased peripheral vision and saccades with fast eye movements laterally. He would benefit from continued skilled therapy to work on improving balance in sitting and standing to reduce fall risk, to work on coordination to with L LE and UE task to make patient more stable during  mobility, and to work on midline orientation in sitting and standing. PT recommends inpatient rehab for d/c. If he can't tolerate 3 hours of therapy, SNF for d/c.    Outcome Measures     Row Name 08/09/19 1100 08/09/19 1000          How much help from another person do you currently need...    Turning from your back to your side while in flat bed without using bedrails?  --  3  -MS (r) AL (t) MS (c)     Moving from lying on back to sitting on the side of a flat bed without bedrails?  --  3  -MS (r) AL (t) MS (c)     Moving to and from a bed to a chair (including a wheelchair)?  --  3  -MS (r) AL (t) MS (c)     Standing up from a chair using your arms (e.g., wheelchair, bedside chair)?  --  3  -MS (r) AL (t) MS (c)     Climbing 3-5 steps with a railing?  --  1  -MS (r) AL (t) MS (c)     To walk in hospital room?  --  2  -MS (r) AL (t) MS (c)     AM-PAC 6 Clicks Score (PT)  --  15  -MS (r) AL (t)        How much help from another is currently needed...    Putting on and taking off regular lower body clothing?  3  -MW  --     Bathing (including washing, rinsing, and drying)  2  -MW  --     Toileting (which includes using toilet bed pan or urinal)  3  -MW  --     Putting on and taking off regular upper body clothing  3  -MW  --     Taking care of personal grooming (such as brushing teeth)  3  -MW  --     Eating meals  3  -MW  --     AM-PAC 6 Clicks Score (OT)  17  -MW  --        Modified [place] Scale    Modified Nicholas Scale  --  4 - Moderately severe disability.  Unable to walk without assistance, and unable to attend to own bodily needs without assistance.  -MS (r) AL (t) MS (c)        Functional Assessment    Outcome Measure Options  AM-PAC 6 Clicks Daily Activity (OT)  -MW  AM-PAC 6 Clicks Basic Mobility (PT);Modified [place]  -MS (r) AL (t) MS (c)       User Key  (r) = Recorded By, (t) = Taken By, (c) = Cosigned By    Initials Name Provider Type    Rebekah Wise, PT, DPT, NCS Physical Therapist    MAGDIEL Mcgowan,  Yani MACEDO, OTR/L Occupational Therapist    Hernan Crawley, PT Student PT Student         Time Calculation:   PT Charges     Row Name 08/09/19 1110             Time Calculation    Start Time  0947 includes chart review  -MS (r) AL (t) MS (c)      Stop Time  1027  -MS (r) AL (t) MS (c)      Time Calculation (min)  40 min  -MS (r) AL (t)      PT Received On  08/09/19  -MS (r) AL (t) MS (c)      PT Goal Re-Cert Due Date  08/19/19  -MS (r) AL (t) MS (c)        User Key  (r) = Recorded By, (t) = Taken By, (c) = Cosigned By    Initials Name Provider Type    MS Gordon Rebekah WESLEY, PT, DPT, NCS Physical Therapist    Hernan Crawley, PT Student PT Student        Therapy Charges for Today     Code Description Service Date Service Provider Modifiers Qty    [id number] HC PT EVAL MOD COMPLEXITY 3 8/9/2019 Hernan Rodriguez, PT Student GP 1          PT G-Codes  Outcome Measure Options: AM-PAC 6 Clicks Daily Activity (OT)  AM-PAC 6 Clicks Score (PT): 15  AM-PAC 6 Clicks Score (OT): 17  Modified [place] Scale: 4 - Moderately severe disability.  Unable to walk without assistance, and unable to attend to own bodily needs without assistance.      HERNAN Rodriguez, PT Student  8/9/2019

## 2019-08-09 NOTE — PLAN OF CARE
Problem: Patient Care Overview  Goal: Plan of Care Review  Outcome: Ongoing (interventions implemented as appropriate)   08/09/19 9493   Coping/Psychosocial   Plan of Care Reviewed With patient   Plan of Care Review   Progress improving   OTHER   Outcome Summary A&OX4. BP elevated. C/o headache. Prn medication given with good relief. NIH=7. L sided weakness remains. Stroke folder provided and reviewed with pt. Safety maintained. Will continue to monitor.

## 2019-08-09 NOTE — THERAPY EVALUATION
Acute Care - Speech Language Pathology Initial Evaluation  [facility]     Patient Name: Olman Vaca  : 1970  MRN: [id number]  Today's Date: 2019  Onset of Illness/Injury or Date of Surgery: 19     Referring Physician: Dr. Collado      Admit Date: 2019     Speech-language/cognitive eval completed. No deficits observed in the areas of receptive and expressive language fx, no motor speech deficit. Difficulty with delayed, unrelated recall, as he was only able to recall one of three items that were verbally presented, though this increased to 100% accuracy when provided semantic cue and/or choice between two options. 1x error noted during abstract convergent naming task. 1x difficulty with self correction when reasoning to describe why a particular item doesn't belong with a set of items presented verbally. 1x difficulty with problem solving involving multiple time increments. 2x mild difficulty with reasoning to explain absurdities, though self correct was observed on one of the two instances. 1x difficulty with reasoning to provide multiple solutions to a divided attention task when stating multiple similarities between objects. Pt reported new onset visual changes, though he also reported prior visual impairment for which he has not seen an ophthalmologist. Visual testing was completed, which revealed difficulty with visual attention beyond midline to the L side, though not fully neglecting the entire L visual field. With extra time and verbal cues, pt was able to show increased accuracy with identification of items in the upper portion of the lower half of the L visual field, yet continued to have difficulty with awareness in the far L portion of the lower L field, as well as in the bottom portion of the L visual field. Pt would benefit from continued ST services for minimal to mild cognitive impairment and for R hemisphere training. Thanks! Keisha Pemberton, JOHNNY-SLP 2019 3:31  PM    Visit Dx:    ICD-10-CM ICD-9-CM   1. Cognitive and behavioral changes R41.89 799.59    R46.89 312.9   2. Impaired functional mobility, balance, gait, and endurance Z74.09 V49.89   3. Impaired mobility and ADLs Z74.09 799.89     Patient Active Problem List   Diagnosis   • Left hemiparesis (CMS/HCC)   • CVA (cerebral vascular accident) (CMS/HCC)   • Benign essential HTN   • Medical non-compliance   • HLD (hyperlipidemia)   • Alcohol use disorder, moderate, dependence (CMS/HCC)   • Vocal cord polyp   • Schuler aneurysm     Past Medical History:   Diagnosis Date   • Benign essential HTN 8/8/2019   • HLD (hyperlipidemia) 8/8/2019     Past Surgical History:   Procedure Laterality Date   • FEMORAL LYMPH NODE BIOPSY/EXCISON     • MOUTH SURGERY          SLP EVALUATION (last 72 hours)      SLP SLC Evaluation     Row Name 08/09/19 1320                   Communication Assessment/Intervention    Document Type  evaluation  -TM        Subjective Information  complains of Visual changes   -TM        Patient Observations  alert;cooperative  -TM        Patient/Family Observations  No family present  -TM        Patient Effort  adequate  -TM           General Information    Patient Profile Reviewed  yes  -TM        Pertinent History Of Current Problem  MRI brain showed acute extensive infarct in the right PCA territory, acute dizziness, L facial numbness, as well as L arm and leg weakness. Hx of HTN, hyperlipidemia, non-compliance with meds.   -TM        Precautions/Limitations, Vision  vision impairment, left;neglect, left;other (see comments) Pt also reported overall hx of visual impairment  -TM        Precautions/Limitations, Hearing  WFL  -TM        Patient Level of Education  Unknown  -TM        Prior Level of Function-Communication  WFL  -TM        Plans/Goals Discussed with  patient  -TM        Barriers to Rehab  none identified  -TM        Patient's Goals for Discharge  patient did not state  -TM           Pain Assessment     Additional Documentation  Pain Scale: Numbers Pre/Post-Treatment (Group)  -TM           Pain Scale: Numbers Pre/Post-Treatment    Pain Scale: Numbers, Pretreatment  4/10  -TM        Pain Scale: Numbers, Post-Treatment  4/10  -TM           Comprehension Assessment/Intervention    Comprehension Assessment/Intervention  Auditory Comprehension  -TM           Auditory Comprehension Assessment/Intervention    Auditory Comprehension (Communication)  WNL  -TM        Able to Identify Objects/Pictures (Communication)  WNL  -TM        Answers Questions (Communication)  simple;complex;yes/no;WFL  -TM        Able to Follow Commands (Communication)  WFL  -TM        Narrative Discourse  conversational level;WFL  -TM           Expression Assessment/Intervention    Expression Assessment/Intervention  verbal expression  -TM           Verbal Expression Assessment/Intervention    Verbal Expression  WNL  -TM        Spontaneous/Functional Words  WNL  -TM        Sentence Formulation  WNL  -TM        Conversational Discourse/Fluency  WNL  -TM           Oral Motor Structure and Function    Oral Motor Structure and Function  WNL  -TM        Dentition Assessment  natural, present and adequate  -TM           Oral Musculature and Cranial Nerve Assessment    Oral Motor General Assessment  WFL  -TM           Motor Speech Assessment/Intervention    Motor Speech Function  WNL  -TM           Cognitive Assessment Intervention- SLP    Cognitive Function (Cognition)  WFL  -TM        Orientation Status (Cognition)  person;place;time;situation;WNL  -TM        Memory (Cognitive)  immediate;mod-complex;WNL;delayed;mild impairment  -TM        Attention (Cognitive)  WNL  -TM        Thought Organization (Cognitive)  WFL  -TM        Reasoning (Cognitive)  WNL  -TM        Problem Solving (Cognitive)  simple;multifactorial;WNL;temporal;mild impairment  -TM        Functional Math (Cognitive)  word problems;mild impairment  -TM        Executive Function  (Cognition)  deficit awareness;mild impairment  -TM        Right Hemisphere Function  left neglect;visuo-spatial;moderate impairment  -TM           SLP Clinical Impressions    SLP Diagnosis  Minimal cognitive impairment, L visual impairment with neglect  -TM        Rehab Potential/Prognosis  good  -TM        SLC Criteria for Skilled Therapy Interventions Met  yes  -TM        Functional Impact  functional impact in ADLs;functional impact in social situations  -TM           Recommendations    Therapy Frequency (SLP SLC)  PRN  -TM        Predicted Duration Therapy Intervention (Days)  until discharge  -TM        Anticipated Dischage Disposition  home  -TM           Communication Treatment Objective and Progress Goals (SLP)    Cognitive Linguistic Treatment Objectives  Cognitive Linguistic Treatment Objectives (Group)  -TM           Cognitive Linguistic Treatment Objectives    Problem Solving Selection  --  -TM        Functional Math Skills Selection  functional math skills, SLP goal 1  -TM        Right Hemisphere Function Selection  right hemisphere function, SLP goal 1  -TM           Functional Math Skills Goal 1 (SLP)    Improve Functional Math Skills Through Goal 1 (SLP)  complete word problems involving time;complete word problems involving money;90%;independently (over 90% accuracy)  -TM        Time Frame (Functional Math Skills Goal 1, SLP)  by discharge  -TM        Barriers (Functional Math Skills Goal 1, SLP)  n/a  -TM        Progress/Outcomes (Functional Math Skills Goal 1, SLP)  goal ongoing  -TM           Right Hemisphere Function Goal 1 (SLP)    Improve Right Hemisphere Function Through Goal 1 (SLP)  use compensatory strategies for left neglect;answer questions regarding situational information;90%;independently (over 90% accuracy)  -TM        Time Frame (Right Hemisphere Function Goal 1, SLP)  by discharge  -TM        Barriers (Right Hemisphere Function Goal 1, SLP)  Visual deficit  -TM         Progress/Outcomes (Right Hemisphere Function Goal 1, SLP)  goal ongoing  -TM          User Key  (r) = Recorded By, (t) = Taken By, (c) = Cosigned By    Initials Name Effective Dates    Keisha Girard CCC-SLP 08/02/16 -              EDUCATION  The patient has been educated in the following areas:     Cognitive Impairment.    SLP Recommendation and Plan  SLP Diagnosis: Minimal cognitive impairment, L visual impairment with neglect     SLC Criteria for Skilled Therapy Interventions Met: yes  SLP Diagnosis: Minimal cognitive impairment, L visual impairment with neglect  Anticipated Dischage Disposition: home     Predicted Duration Therapy Intervention (Days): until discharge    Plan of Care Reviewed With: patient  Plan of Care Review  Plan of Care Reviewed With: patient  Progress: (Eval )  Outcome Summary: Speech-language/cognitive eval completed. No deficits observed in the areas of receptive and expressive language fx, no motor speech deficit. Difficulty with delayed, unrelated recall, as he was only able to recall one of three items that were verbally presented, though this increased to 100% accuracy when provided semantic cue and/or choice between two options. 1x error noted during abstract convergent naming task. 1x difficulty with self correction when reasoning to describe why a particular item doesn't belong with a set of items presented verbally. 1x difficulty with problem solving involving multiple time increments. 2x mild difficulty with reasoning to explain absurdities, though self correct was observed on one of the two instances. 1x difficulty with reasoning to provide multiple solutions to a divided attention task when stating multiple similarities between objects. Pt reported new onset visual changes, though he also reported prior visual impairment for which he has not seen an ophthalmologist. Visual testing was completed, which revealed difficulty with visual attention beyond midline to the L side,  though not fully neglecting the entire L visual field.       SLP GOALS     Row Name 08/09/19 1320             Functional Math Skills Goal 1 (SLP)    Improve Functional Math Skills Through Goal 1 (SLP)  complete word problems involving time;complete word problems involving money;90%;independently (over 90% accuracy)  -TM      Time Frame (Functional Math Skills Goal 1, SLP)  by discharge  -TM      Barriers (Functional Math Skills Goal 1, SLP)  n/a  -TM      Progress/Outcomes (Functional Math Skills Goal 1, SLP)  goal ongoing  -TM         Right Hemisphere Function Goal 1 (SLP)    Improve Right Hemisphere Function Through Goal 1 (SLP)  use compensatory strategies for left neglect;answer questions regarding situational information;90%;independently (over 90% accuracy)  -TM      Time Frame (Right Hemisphere Function Goal 1, SLP)  by discharge  -TM      Barriers (Right Hemisphere Function Goal 1, SLP)  Visual deficit  -TM      Progress/Outcomes (Right Hemisphere Function Goal 1, SLP)  goal ongoing  -TM        User Key  (r) = Recorded By, (t) = Taken By, (c) = Cosigned By    Initials Name Provider Type     Keisha Pemberton CCC-SLP Speech and Language Pathologist                  Time Calculation:     Time Calculation- SLP     Row Name 08/09/19 1528             Time Calculation- SLP    SLP Start Time  1320  -TM      SLP Stop Time  1433  -TM      SLP Time Calculation (min)  73 min  -TM      SLP Received On  08/09/19  -      SLP Goal Re-Cert Due Date  08/19/19  -        User Key  (r) = Recorded By, (t) = Taken By, (c) = Cosigned By    Initials Name Provider Type     Keisha Pemberton CCC-SLP Speech and Language Pathologist          Therapy Charges for Today     Code Description Service Date Service Provider Modifiers Qty    [id number]  ST EVAL SPEECH AND PROD W LANG  5 8/9/2019 Keisha Pemberton CCC-SLP GN 1                     JOSE ANGEL Olsen  8/9/2019

## 2019-08-09 NOTE — PROGRESS NOTES
Discharge Planning Assessment  [facility]     Patient Name: Olman Vaca  MRN: [id number]  Today's Date: 8/9/2019    Admit Date: 8/8/2019    Discharge Needs Assessment     Row Name 08/09/19 1054       Living Environment    Lives With  alone    Current Living Arrangements  home/apartment/condo    Primary Care Provided by  self    Provides Primary Care For  no one    Family Caregiver if Needed  parent(s)    Quality of Family Relationships  stressful    Able to Return to Prior Arrangements  yes       Resource/Environmental Concerns    Resource/Environmental Concerns  none    Transportation Concerns  car, none       Transition Planning    Patient/Family Anticipates Transition to  home with family    Patient/Family Anticipated Services at Transition  none    Transportation Anticipated  family or friend will provide       Discharge Needs Assessment    Readmission Within the Last 30 Days  no previous admission in last 30 days    Concerns to be Addressed  no discharge needs identified;denies needs/concerns at this time    Equipment Currently Used at Home  none    Anticipated Changes Related to Illness  none    Equipment Needed After Discharge  none    Discharge Coordination/Progress  Pt is medicaid pending at this time. Pt states that he does have a PCP. Pt plans on discharging home to live alone. Pt states that his parents would be able to assist if needed. Pt denies any needs or concerns at this time. SW will follow and assist with any discharge needs that may arise.         Discharge Plan    No documentation.       Destination      No service coordination in this encounter.      Durable Medical Equipment      No service coordination in this encounter.      Dialysis/Infusion      No service coordination in this encounter.      Home Medical Care      No service coordination in this encounter.      Therapy      No service coordination in this encounter.      Community Resources      No service coordination in this  encounter.          Demographic Summary    No documentation.       Functional Status    No documentation.       Psychosocial    No documentation.       Abuse/Neglect    No documentation.       Legal    No documentation.       Substance Abuse    No documentation.       Patient Forms    No documentation.           Krissy Dorsey

## 2019-08-09 NOTE — NURSING NOTE
Red stroke folder reviewed/provided. Pt verbalizes understanding of s/s of stroke as well as risk factors.

## 2019-08-09 NOTE — NURSING NOTE
NEUROSCIENCE COORDINATOR NOTE       HPI:   Olman Vaca is a 49 y.o.  male who was admitted on 8/8/2019 for complaints of extremity numbness, dizziness, facial numbness, discoordination of left hand.  Last known well was 8/8/2019 and onset was 8-8-2019 00:00.  Initial NIHSS score was >5.  The patient was not a candidate for thrombolytic therapy due to Symptom onset >4.5 hours LKW >4.5 hours.    Stroke risk factors include: hyperlipidemia, hypertension, smoking, excessive alcohol use  and illicit drug use.      The patient was admitted to: telemetry bed  Stroke order set utilized: TIA/Ischemic Stroke Admission (Without Thrombolytic Therapy)  Consults include: neurology, speech therapy, OT, PT or case management    Prior stroke history:   None    Past Medical History:   Diagnosis Date   • Benign essential HTN 8/8/2019   • HLD (hyperlipidemia) 8/8/2019     Past Surgical History:   Procedure Laterality Date   • FEMORAL LYMPH NODE BIOPSY/EXCISON     • MOUTH SURGERY       Social History     Socioeconomic History   • Marital status: Single     Spouse name: Not on file   • Number of children: Not on file   • Years of education: Not on file   • Highest education level: Not on file   Tobacco Use   • Smoking status: Current Every Day Smoker     Packs/day: 1.00     Years: 42.00     Pack years: 42.00     Types: Cigarettes   • Smokeless tobacco: Never Used   Substance and Sexual Activity   • Alcohol use: Yes     Alcohol/week: 42.0 oz     Types: 70 Shots of liquor per week   • Drug use: Yes     Frequency: 7.0 times per week     Types: Marijuana   • Sexual activity: Not Currently        Home Medications:    No medications prior to admission.       Current Medications:     Current Facility-Administered Medications   Medication Dose Route Frequency Provider Last Rate Last Dose   • acetaminophen (TYLENOL) tablet 650 mg  650 mg Oral Q4H PRN Tay Collado,        • aluminum-magnesium hydroxide-simethicone (MAALOX MAX)  400-400-40 MG/5ML suspension 15 mL  15 mL Oral Q6H PRN Tay Collado DO       • aspirin chewable tablet 81 mg  81 mg Oral Daily Tay Collado DO   81 mg at 08/09/19 0810    Or   • aspirin suppository 300 mg  300 mg Rectal Daily Tay Collado DO       • atorvastatin (LIPITOR) tablet 80 mg  80 mg Oral Nightly Tay Collado DO   80 mg at 08/08/19 2059   • bisacodyl (DULCOLAX) EC tablet 5 mg  5 mg Oral Daily PRN Tay Collado DO       • labetalol (NORMODYNE,TRANDATE) injection 20 mg  20 mg Intravenous Q10 Min PRN Tay Collado DO       • LORazepam (ATIVAN) tablet 1 mg  1 mg Oral Q2H PRN Tay Collado DO        Or   • LORazepam (ATIVAN) injection 1 mg  1 mg Intravenous Q2H PRN Tay Collado DO        Or   • LORazepam (ATIVAN) tablet 2 mg  2 mg Oral Q1H PRN Tay Collado DO        Or   • LORazepam (ATIVAN) injection 2 mg  2 mg Intravenous Q1H PRN Tay Collado DO        Or   • LORazepam (ATIVAN) injection 2 mg  2 mg Intravenous Q15 Min PRN Tay Collado DO        Or   • LORazepam (ATIVAN) injection 2 mg  2 mg Intramuscular Q15 Min PRN Tay Collado DO       • melatonin tablet 3 mg  3 mg Oral Nightly PRN Tay Collado DO       • nicotine (NICODERM CQ) 14 MG/24HR patch 1 patch  1 patch Transdermal Q24H Tay Collado DO   1 patch at 08/08/19 2058   • ondansetron (ZOFRAN) injection 4 mg  4 mg Intravenous Q6H PRN Tay Collado DO       • sodium chloride 0.9 % flush 3 mL  3 mL Intravenous Q12H Tay Collado DO   3 mL at 08/09/19 0811   • sodium chloride 0.9 % flush 3-10 mL  3-10 mL Intravenous PRN Tay Colldao DO       • sodium chloride 0.9 % infusion  75 mL/hr Intravenous Continuous Tay Collado DO 75 mL/hr at 08/09/19 0413 75 mL/hr at 08/09/19 0413   • thiamine (B-1) 100 mg in sodium chloride 0.9 % 100 mL IVPB  100 mg Intravenous Daily Tay Collado,  mL/hr at 08/09/19 0811 100 mg at  08/09/19 0811   • traMADol (ULTRAM) tablet 50 mg  50 mg Oral Q6H PRN Tay Collado DO           Vital signs in last 24 hours:  Temp:  [97.5 °F (36.4 °C)-98.3 °F (36.8 °C)] 97.5 °F (36.4 °C)  Heart Rate:  [53-60] 58  Resp:  [16-18] 16  BP: (166-178)/() 170/111      DIAGNOSTIC TESTING       Laboratory Results:  Results from last 7 days   Lab Units 08/09/19  0437   CHOLESTEROL mg/dL 207*   HDL CHOL mg/dL 38*   LDL CHOL mg/dL 133*   TRIGLYCERIDES mg/dL 161*     Results from last 7 days   Lab Units 08/09/19  0437   HEMOGLOBIN A1C % 5.7                     Radiology Results:    Images:    Imaging Results (last 72 hours)     Procedure Component Value Units Date/Time    CT Angiogram Head With & Without Contrast [[id number]] Collected:  08/08/19 1927     Updated:  08/08/19 1941    Narrative:       EXAMINATION: CT angiogram of the head with contrast dated 08/20/2019     DOSE: 504 mGycm. Automated exposure control was utilized to diminish  patient radiation dose..     HISTORY: Stroke symptoms     FINDINGS: Multiple contiguous axial images are obtained through the  [place] of [place] 1 mm intervals findings contrast ministration with  reformatted images obtained in the sagittal and coronal projections from  the original data set.     The right vertebral artery is dominant. There is mild plaquing with  associated stenosis of the proximal intracranial aspect of the right  vertebral artery. Both distal vertebral arteries are otherwise widely  patent to the level the basilar artery.     The basilar artery is normal in caliber. Both superior cerebellar  arteries as well as the left posterior cerebral artery are normal in  caliber and appearance. There is some attenuation within the P2 segment  of the right posterior cerebral artery suggesting some underlying  steno-occlusive disease. This is in the same segment as the patient's  acute infarct.     The distal internal carotid arteries are widely patent. There is  mild  calcific plaquing involving the cavernous and supraclinoid aspect of  both ICAs without evidence of rate limiting stenosis. There is a  hypoplastic A1 segment of the right anterior cerebral artery. A 6 mm  aneurysm is noted at the right MCA trifurcation. The anterior and middle  cerebral arteries are otherwise widely patent. No additional aneurysms  are identified.       Impression:       1.. Attenuation suggesting steno-occlusive disease of the P2 segment of  the right posterior cerebral artery. There is mild calcific plaquing  involving the proximal intracranial aspect of the right vertebral  artery. The posterior circulation is otherwise unremarkable.  2. 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 There is a hypoplastic A1 segment of the right anterior  cerebral artery. The anterior circulation is otherwise unremarkable.  3. The dural venous sinuses are unremarkable with no evidence of dural  venous sinus thrombosis.  This report was finalized on 08/08/2019 19:38 by Dr. Amado Escalona MD.    CT Angiogram Neck With & Without Contrast [[id number]] Collected:  08/08/19 1810     Updated:  08/08/19 1826    Narrative:       EXAMINATION: CT angiogram of the neck with contrast 08/08/2019     DOSE: 504 mGycm. Automated exposure control was utilized to diminish  patient radiation dose..     HISTORY: Stroke.     FINDINGS: Multiple contiguous axial and obtained from the aortic arch  through the skull base at 1 mm intervals following intravenous contrast  administration with reformatted images obtained in the sagittal and  coronal projections from the original dataset as well as MIPS.     The study is degraded by motion. There is a calcified granuloma within  the right upper lobe. Lung apices are otherwise clear. The superior  mediastinum is unremarkable. There is mild tortuosity of the proximal  great vessels.     The thyroid gland is homogeneous in density without evidence of  focal  nodule or mass. No evidence of adenopathy in the supraclavicular fossa.  The level of the true and false cords is remarkable for asymmetric  nodule which appears to be associated with the left vocal fold. This  measures approximately 5 mm in size and direct visualization is  recommended. There are some mildly prominent left posterior triangle  nodes including an 8 mm short axis node. Smaller nodes are noted within  the jugular chain bilaterally. The epiglottis and aryepiglottic folds  are unremarkable. The nasopharynx and parapharyngeal spaces are  unremarkable with no evidence of mass or mass effect.     There is some mild plaquing involving the innominate artery on the  right. The origin of the great vessels are otherwise unremarkable  including the vertebral arteries although again partially obscured  secondary to motion artifact. The right vertebral artery is dominant.     Both vertebral arteries are widely patent in their extracranial aspect.  There is calcific plaquing with mild associated stenosis of the proximal  intracranial segment of the right vertebral artery.     The right common carotid artery is widely patent to the level of the  carotid bifurcation. There is mild calcific plaquing involving the  carotid bulb and proximal aspect of the ICA. The more distal right ICA  is tortuous. There is very slight narrowing of the proximal segment of  the right ICA but with a less than 20% cross-sectional narrowing. The  more distal right ICA is widely patent.     The left common carotid artery is widely patent. There is mild calcific  plaquing involving the carotid bulb and proximal aspect of the ICA again  resulting in only a very mild stenosis with a less than 20%  cross-sectional narrowing of the lumen of the vessel. The more distal  left ICA is tortuous but otherwise widely patent.       Impression:       1. There is mild calcific plaquing and noncalcific plaque involving the  carotid bulb and proximal  aspect of both internal carotid arteries. This  is not resulting in a hemodynamically significant stenosis with a less  than 20% cross-sectional narrowing noted of both ICAs. The more distal  ICAs are tortuous but otherwise widely patent.  2. Minimal calcific plaquing involving the proximal right innominate.  The origin of the great vessels are otherwise unremarkable.  3. Right vertebral artery is dominant. Both vertebral arteries are  widely patent in their extracranial aspect. There is some mild calcific  plaquing with mild associated stenosis of the proximal intracranial  aspect of the right vertebral artery.  4. There is a polypoid lesion which appears to be associated with the  left vocal fold. This would warrant follow-up with direct visualization  following ENT consultation.  This report was finalized on 08/08/2019 18:23 by Dr. Amado Escalona MD.    MRI Brain Without Contrast [[id number]] Collected:  08/08/19 1656     Updated:  08/08/19 1700    Narrative:       EXAM: MR BRAIN WITHOUT IV CONTRAST 08/08/2019     COMPARISON: None      HISTORY: 49 years-old Male. Stroke      TECHNIQUE:   Routine pulse sequences of the brain were obtained without IV contrast.      REPORT:     There is diffusion restriction within the right thalamus and medial to  the lobe, with associated T2/flair abnormal signal, consistent with  acute right PCA infarct. There is no hemorrhagic conversion.     No acute hydrocephalus. No midline shift. No suspicious extra fluid  collection. Mild chronic microvascular changes. Mild-to-moderate  cerebral volume loss.     Mastoid air cells are clear. The paranasal sinuses are free of  obstructive mucosal disease. The intraorbital structures are  unremarkable. The basal cisterns are preserved.                   Impression:       1.  Acute extensive infarct in the right PCA territory.  This report was finalized on 08/08/2019 16:57 by Dr. Gwen Conley MD.              LAST DOCUMENTED NIHSS:    Interval: baseline  1a. Level of Consciousness: 0-->Alert, keenly responsive  1b. LOC Questions: 0-->Answers both questions correctly  1c. LOC Commands: 0-->Performs both tasks correctly     3. Visual: 2-->Complete hemianopia  4. Facial Palsy: 0-->Normal symmetrical movements  5a. Motor Arm, Left: 1-->Drift, limb holds 90 (or 45) degrees, but drifts down before full 10 seconds, does not hit bed or other support  5b. Motor Arm, Right: 0-->No drift, limb holds 90 (or 45) degrees for full 10 secs  6a. Motor Leg, Left: 1-->Drift, leg falls by the end of the 5-sec period but does not hit bed  6b. Motor Leg, Right: 0-->No drift, leg holds 30 degree position for full 5 secs  7. Limb Ataxia: 1-->Present in one limb  8. Sensory: 1-->Mild-to-moderate sensory loss, patient feels pinprick is less sharp or is dull on the affected side, or there is a loss of superficial pain with pinprick, but patient is aware of being touched  9. Best Language: 0-->No aphasia, normal  10. Dysarthria: 0-->Normal  11. Extinction and Inattention (formerly Neglect): 0-->No abnormality    Total (NIH Stroke Scale): 7      Chart reviewed for stroke metrics.      Plan of care discussed with the patient and family at bedside and all questions answered.  Discussed individual stroke risk factors hyperlipidemia, hypertension, smoking, excessive alcohol use  and illicit drug use.  We also discussed  the importance of medication compliance in preventing future strokes.  Stroke signs and symptoms were reviewed with the patient including F.A.S.T., visual disturbances, dizziness/loss of balance, and severe headache. We discussed the importance of calling 911 if symptoms of TIA/Stroke occur once discharged and the patient voices their understanding. Patient provided with my contact information in the event of questions or needs. I will continue to follow.         Divina Vega RN NRT  Neuroscience Coordinator

## 2019-08-09 NOTE — PLAN OF CARE
Problem: Patient Care Overview  Goal: Plan of Care Review  Outcome: Ongoing (interventions implemented as appropriate)   08/09/19 1515   Coping/Psychosocial   Plan of Care Reviewed With patient   Plan of Care Review   Progress (Eval )   OTHER   Outcome Summary Speech-language/cognitive eval completed. No deficits observed in the areas of receptive and expressive language fx, no motor speech deficit. Difficulty with delayed, unrelated recall, as he was only able to recall one of three items that were verbally presented, though this increased to 100% accuracy when provided semantic cue and/or choice between two options. 1x error noted during abstract convergent naming task. 1x difficulty with self correction when reasoning to describe why a particular item doesn't belong with a set of items presented verbally. 1x difficulty with problem solving involving multiple time increments. 2x mild difficulty with reasoning to explain absurdities, though self correct was observed on one of the two instances. 1x difficulty with reasoning to provide multiple solutions to a divided attention task when stating multiple similarities between objects. Pt reported new onset visual changes, though he also reported prior visual impairment for which he has not seen an ophthalmologist. Visual testing was completed, which revealed difficulty with visual attention beyond midline to the L side, though not fully neglecting the entire L visual field. With extra time and verbal cues, pt was able to show increased accuracy with identification of items in the upper portion of the lower half of the L visual field, yet continued to have difficulty with awareness in the far L portion of the lower L field, as well as in the bottom portion of the L visual field. Pt would benefit from continued ST services for minimal to mild cognitive impairment and for R hemisphere training. Thanks!

## 2019-08-09 NOTE — PLAN OF CARE
Problem: Patient Care Overview  Goal: Plan of Care Review  Outcome: Ongoing (interventions implemented as appropriate)   08/09/19 1115   Coping/Psychosocial   Plan of Care Reviewed With patient   Plan of Care Review   Progress no change   OTHER   Outcome Summary OT eval completed. Pt demos decreased FMC/GMC and strength LUE compared to RUE. CGA-Alia for LB dressing task w 1 instance LOB to L in sitting. Pt stands CGA-minAx2, demos L and posterior lean in standing. Balance improves with use of RW but demos decreased motor planning LLE with taking steps near bed. Pt with hypersensitivity to light touch and pain sensation posterior upper arm. Pt reports blurry vision, saccades present with tracking. Skilled OT required to address stated deficits to increase pt independence and safety to reduce fall risk. Recommend d/c to inpatient rehab.

## 2019-08-10 PROCEDURE — 25010000002 THIAMINE PER 100 MG: Performed by: FAMILY MEDICINE

## 2019-08-10 PROCEDURE — 97535 SELF CARE MNGMENT TRAINING: CPT

## 2019-08-10 PROCEDURE — 97116 GAIT TRAINING THERAPY: CPT

## 2019-08-10 RX ORDER — SENNA AND DOCUSATE SODIUM 50; 8.6 MG/1; MG/1
2 TABLET, FILM COATED ORAL 2 TIMES DAILY
Status: DISCONTINUED | OUTPATIENT
Start: 2019-08-10 | End: 2019-08-13 | Stop reason: HOSPADM

## 2019-08-10 RX ORDER — MAGNESIUM CARB/ALUMINUM HYDROX 105-160MG
150 TABLET,CHEWABLE ORAL ONCE
Status: DISCONTINUED | OUTPATIENT
Start: 2019-08-10 | End: 2019-08-13 | Stop reason: HOSPADM

## 2019-08-10 RX ORDER — TERAZOSIN 5 MG/1
5 CAPSULE ORAL DAILY
Status: DISCONTINUED | OUTPATIENT
Start: 2019-08-10 | End: 2019-08-13 | Stop reason: HOSPADM

## 2019-08-10 RX ORDER — METOPROLOL SUCCINATE 50 MG/1
50 TABLET, EXTENDED RELEASE ORAL
Status: DISCONTINUED | OUTPATIENT
Start: 2019-08-10 | End: 2019-08-10

## 2019-08-10 RX ADMIN — ASPIRIN 81 MG 81 MG: 81 TABLET ORAL at 08:53

## 2019-08-10 RX ADMIN — AMLODIPINE BESYLATE 5 MG: 5 TABLET ORAL at 08:53

## 2019-08-10 RX ADMIN — TERAZOSIN HYDROCHLORIDE 5 MG: 5 CAPSULE ORAL at 13:06

## 2019-08-10 RX ADMIN — ACETAMINOPHEN 650 MG: 325 TABLET, FILM COATED ORAL at 21:02

## 2019-08-10 RX ADMIN — SODIUM CHLORIDE, PRESERVATIVE FREE 3 ML: 5 INJECTION INTRAVENOUS at 08:52

## 2019-08-10 RX ADMIN — SENNOSIDES AND DOCUSATE SODIUM 2 TABLET: 8.6; 5 TABLET ORAL at 13:06

## 2019-08-10 RX ADMIN — NICOTINE 1 PATCH: 14 PATCH, EXTENDED RELEASE TRANSDERMAL at 17:56

## 2019-08-10 RX ADMIN — DESMOPRESSIN ACETATE 80 MG: 0.2 TABLET ORAL at 21:02

## 2019-08-10 RX ADMIN — THIAMINE HYDROCHLORIDE 100 MG: 100 INJECTION, SOLUTION INTRAMUSCULAR; INTRAVENOUS at 08:53

## 2019-08-10 RX ADMIN — LORAZEPAM 1 MG: 1 TABLET ORAL at 21:15

## 2019-08-10 RX ADMIN — SODIUM CHLORIDE, PRESERVATIVE FREE 3 ML: 5 INJECTION INTRAVENOUS at 21:02

## 2019-08-10 NOTE — PLAN OF CARE
Problem: Patient Care Overview  Goal: Plan of Care Review  Outcome: Ongoing (interventions implemented as appropriate)   08/10/19 1126   Coping/Psychosocial   Plan of Care Reviewed With patient   Plan of Care Review   Progress improving   OTHER   Outcome Summary pt in chair,trans sit-stand cga-min assist, pt amb 50 feet with rwx cga-min assist, cues for proper LLE placement, worked in parallel bars with mirror for foot placement with amb forward/back. pt with left side weakness and decreased coordination. pt would benefit from rehab

## 2019-08-10 NOTE — THERAPY TREATMENT NOTE
Acute Care - Occupational Therapy Treatment Note  [facility]     Patient Name: Olman Vaca  : 1970  MRN: [id number]  Today's Date: 8/10/2019  Onset of Illness/Injury or Date of Surgery: 19  Date of Referral to OT: 19  Referring Physician: Dr. Collado    Admit Date: 2019       ICD-10-CM ICD-9-CM   1. Cognitive and behavioral changes R41.89 799.59    R46.89 312.9   2. Impaired functional mobility, balance, gait, and endurance Z74.09 V49.89   3. Impaired mobility and ADLs Z74.09 799.89     Patient Active Problem List   Diagnosis   • Left hemiparesis (CMS/HCC)   • CVA (cerebral vascular accident) (CMS/HCC)   • Benign essential HTN   • Medical non-compliance   • HLD (hyperlipidemia)   • Alcohol use disorder, moderate, dependence (CMS/HCC)   • Vocal cord polyp   • Schuler aneurysm     Past Medical History:   Diagnosis Date   • Benign essential HTN 2019   • HLD (hyperlipidemia) 2019     Past Surgical History:   Procedure Laterality Date   • FEMORAL LYMPH NODE BIOPSY/EXCISON     • MOUTH SURGERY         Therapy Treatment    Rehabilitation Treatment Summary     Row Name 08/10/19 1042 08/10/19 0920          Treatment Time/Intention    Discipline  physical therapy assistant  -AH  occupational therapy assistant  -TS     Document Type  therapy note (daily note)  -AH2  therapy note (daily note)  -TS     Subjective Information  complains of;weakness;pain  -2  complains of;dizziness  -TS2     Existing Precautions/Restrictions  fall keep systolic BP lower than 160,L weak,decrease coordination  -AH2  fall  -TS2     Treatment Considerations/Comments  keep systolic BP lower than 160, left side weakness and decreased coordination  -2  keep systolic less than 160, decreased coordination/strength L side  -TS3     Recorded by [AH] Mita Montes, PTA 08/10/19 1042  [AH2] Mita Montes, PTA 08/10/19 1117 [TS] Mi Wallace, AGUSTIN/L 08/10/19 0920  [TS2] Mi Wallace, AGUSTIN/L 08/10/19  1410  [TS3] Mi Wallace AGUSTIN/L 08/10/19 1418     Row Name 08/10/19 1042             Vital Signs    Pre Systolic BP Rehab  148  -AH      Pre Treatment Diastolic BP  112  -AH      Post Systolic BP Rehab  157  -AH      Post Treatment Diastolic BP  108  -AH      Recorded by [AH] Mita Montes, PTA 08/10/19 1125      Row Name 08/10/19 0920             Cognitive Assessment/Intervention- PT/OT    Personal Safety Interventions  fall prevention program maintained;gait belt;nonskid shoes/slippers when out of bed  -TS      Recorded by [TS] Mi Wallace AGUSTIN/L 08/10/19 1418      Row Name 08/10/19 1042             Bed Mobility Assessment/Treatment    Comment (Bed Mobility)  chair  -AH      Recorded by [] Mita Montes, PTA 08/10/19 1125      Row Name 08/10/19 0920             Functional Mobility    Functional Mobility- Ind. Level  contact guard assist  -TS      Functional Mobility- Device  rolling walker  -TS      Functional Mobility- Comment  in room, in BR. Increased time with placement of LLE  -TS      Recorded by [TS] Mi Wallace AGUSTIN/L 08/10/19 1418      Row Name 08/10/19 0920             Transfer Assessment/Treatment    Transfer Assessment/Treatment  sit-stand transfer;stand-sit transfer;toilet transfer  -TS      Recorded by [TS] Mi Wallace AGUSTIN/L 08/10/19 1418      Row Name 08/10/19 1042 08/10/19 0920          Sit-Stand Transfer    Sit-Stand [place] (Transfers)  contact guard;minimum assist (75% patient effort);verbal cues  -  contact guard;verbal cues  -TS     Assistive Device (Sit-Stand Transfers)  --  walker, front-wheeled  -TS     Recorded by [] Mita Montes, PTA 08/10/19 1125 [TS] Mi Wallace AGUSTIN/L 08/10/19 1418     Row Name 08/10/19 1042 08/10/19 0920          Stand-Sit Transfer    Stand-Sit [place] (Transfers)  contact guard;minimum assist (75% patient effort);verbal cues  -  contact guard;verbal cues  -TS     Recorded by [] Mita Montes  GERALD, PTA 08/10/19 1125 [TS] Mi Wallace, AGUSTIN/L 08/10/19 1418     Row Name 08/10/19 0920             Toilet Transfer    Type (Toilet Transfer)  sit-stand;stand-sit  -TS      [place] Level (Toilet Transfer)  contact guard  -TS      Assistive Device (Toilet Transfer)  commode;grab bars/safety frame  -TS      Recorded by [TS] Mi Wallace AGUSTIN/L 08/10/19 1418      Row Name 08/10/19 1042             Gait/Stairs Assessment/Training    [place] Level (Gait)  contact guard;minimum assist (75% patient effort);verbal cues  -AH      Assistive Device (Gait)  walker, front-wheeled  -AH      Distance in Feet (Gait)  50 50 x 2  -AH      Comment (Gait/Stairs)  decreased coordination LLE, cues for proper technique for gait  -AH      Recorded by [] Mita Montes, PTA 08/10/19 1125      Row Name 08/10/19 0920             ADL Assessment/Intervention    BADL Assessment/Intervention  bathing;grooming;toileting  -TS      Recorded by [TS] Mi Wallace AGUSTIN/L 08/10/19 1418      Row Name 08/10/19 0920             Bathing Assessment/Intervention    Bathing [place] Level  lower body;contact guard assist  -TS      Bathing Position  supported sitting;supported standing;sink side  -TS      Recorded by [TS] Mi Wallace AGUSTIN/L 08/10/19 1418      Row Name 08/10/19 0920             Grooming Assessment/Training    [place] Level (Grooming)  wash face, hands;oral care regimen;set up;verbal cues  -TS      Grooming Position  supported sitting  -TS      Recorded by [TS] Mi Wallace AGUSTIN/L 08/10/19 1418      Row Name 08/10/19 0920             Toileting Assessment/Training    [place] Level (Toileting)  toileting skills;adjust/manage clothing;perform perineal hygiene;set up;verbal cues;contact guard assist  -TS      Assistive Devices (Toileting)  commode;grab bar/safety frame  -TS      Toileting Position  unsupported sitting;supported standing  -TS      Recorded by [TS] [place]  Mi CAVANAUGH AGUSTIN/L 08/10/19 1418      Row Name 08/10/19 1042             Motor Skills Assessment/Interventions    Additional Documentation  Balance (Group)  -AH      Recorded by [] Mita Montes, PTA 08/10/19 1125      Row Name 08/10/19 1042             Balance    Balance  dynamic balance activity  -AH      Recorded by [AH] Mita Montes, PTA 08/10/19 1125      Row Name 08/10/19 0920             Dynamic Sitting Balance    Level of [place], Reaches Outside Midline (Sitting, Dynamic Balance)  standby assist  -TS      Sitting Position, Reaches Outside Midline (Sitting, Dynamic Balance)  sitting in chair  -TS      Recorded by [TS] Mi Wallace COTA/L 08/10/19 1418      Row Name 08/10/19 1042             Dynamic Balance Activity    Therapeutic Training Performed (Dynamic Balance)  backward walking  -      Comment (Dynamic Balance Training)  worked in parallel bars with mirror for proper foot placement with amb forward/back  -AH      Recorded by [AH] Mita Montes, PTA 08/10/19 1125      Row Name 08/10/19 1042 08/10/19 0920          Positioning and Restraints    Pre-Treatment Position  sitting in chair/recliner  -AH  in bed  -TS     Post Treatment Position  chair  -AH  chair  -TS     In Chair  sitting;call light within reach;encouraged to call for assist;notified nsg  -  sitting;call light within reach;encouraged to call for assist  -TS     Recorded by [AH] Mita Montes, PTA 08/10/19 1125 [TS] Mi Wallace AGUSTIN/L 08/10/19 1418     Row Name 08/10/19 1042 08/10/19 0920          Pain Scale: Numbers Pre/Post-Treatment    Pain Scale: Numbers, Pretreatment  4/10  -AH  0/10 - no pain  -TS     Pain Scale: Numbers, Post-Treatment  4/10  -AH  0/10 - no pain  -TS     Pain Location  head by R eye  -  --     Pain Intervention(s)  Repositioned  -  --     Recorded by [AH] Mita Montes, PTA 08/10/19 1125 [TS] Mi Wallace AGUSTIN/L 08/10/19 1418     Row Name 08/10/19 0920              Outcome Summary/Treatment Plan (OT)    Daily Summary of Progress (OT)  progress toward functional goals is good  -TS      Recorded by [TS] Mi Wallace COTA/L 08/10/19 1418        User Key  (r) = Recorded By, (t) = Taken By, (c) = Cosigned By    Initials Name Effective Dates Discipline     Mita Montes, PTA 08/02/16 -  PT    TS Mi Wallace COTA/L 08/02/16 -  OT             Occupational Therapy Education     Title: PT OT SLP Therapies (In Progress)     Topic: Occupational Therapy (Done)     Point: ADL training (Done)     Description: Instruct learner(s) on proper safety adaptation and remediation techniques during self care or transfers.   Instruct in proper use of assistive devices.    Learning Progress Summary           Patient Acceptance, E, VU by  at 8/9/2019 11:19 AM    Comment:  ADL, transfer training, bed mobility, balance and safety to reduce fall risk, benefit of use of RW for stability, benefit of continued rehab at d/c                               User Key     Initials Effective Dates Name Provider Type Discipline     08/28/18 -  Yani Mcgowan, OTR/L Occupational Therapist OT                OT Recommendation and Plan  Outcome Summary/Treatment Plan (OT)  Daily Summary of Progress (OT): progress toward functional goals is good  Daily Summary of Progress (OT): progress toward functional goals is good  Plan of Care Review  Plan of Care Reviewed With: patient  Plan of Care Reviewed With: patient  Outcome Summary: Pt SBA for supine to sit with HOB elevated and bed. Pt transfers with CGA with RW. Pt provided verbal cues for slow steady movement. Pt ambulates with RW with CGA with increased time for placement of LLE and cues for  with LUE. Pt CGA for toileting task and LB bathing at side sink. Pt educated coordination activities for increased coordination with LUE. Pt would benefit from acute rehab. Continue OT POC   Outcome Measures     Row Name 08/10/19 1400 08/10/19 1100  08/09/19 1100       How much help from another person do you currently need...    Turning from your back to your side while in flat bed without using bedrails?  --  3  -AH  --    Moving from lying on back to sitting on the side of a flat bed without bedrails?  --  3  -AH  --    Moving to and from a bed to a chair (including a wheelchair)?  --  3  -AH  --    Standing up from a chair using your arms (e.g., wheelchair, bedside chair)?  --  3  -AH  --    Climbing 3-5 steps with a railing?  --  2  -AH  --    To walk in hospital room?  --  3  -AH  --    AM-PAC 6 Clicks Score (PT)  --  17  -AH  --       How much help from another is currently needed...    Putting on and taking off regular lower body clothing?  3  -TS  --  3  -MW    Bathing (including washing, rinsing, and drying)  3  -TS  --  2  -MW    Toileting (which includes using toilet bed pan or urinal)  3  -TS  --  3  -MW    Putting on and taking off regular upper body clothing  3  -TS  --  3  -MW    Taking care of personal grooming (such as brushing teeth)  3  -TS  --  3  -MW    Eating meals  4  -TS  --  3  -MW    AM-PAC 6 Clicks Score (OT)  19  -TS  --  17  -MW       Functional Assessment    Outcome Measure Options  AM-PAC 6 Clicks Daily Activity (OT)  -TS  AM-PAC 6 Clicks Basic Mobility (PT)  -  AM-PAC 6 Clicks Daily Activity (OT)  -MW    Row Name 08/09/19 1000             How much help from another person do you currently need...    Turning from your back to your side while in flat bed without using bedrails?  3  -MS (r) AL (t) MS (c)      Moving from lying on back to sitting on the side of a flat bed without bedrails?  3  -MS (r) AL (t) MS (c)      Moving to and from a bed to a chair (including a wheelchair)?  3  -MS (r) AL (t) MS (c)      Standing up from a chair using your arms (e.g., wheelchair, bedside chair)?  3  -MS (r) AL (t) MS (c)      Climbing 3-5 steps with a railing?  1  -MS (r) AL (t) MS (c)      To walk in hospital room?  2  -MS (r) AL (t) MS  (c)      AM-PAC 6 Clicks Score (PT)  15  -MS (r) AL (t)         Modified Moody Scale    Modified Moody Scale  4 - Moderately severe disability.  Unable to walk without assistance, and unable to attend to own bodily needs without assistance.  -MS (r) AL (t) MS (c)         Functional Assessment    Outcome Measure Options  AM-PAC 6 Clicks Basic Mobility (PT);Modified [place]  -MS (r) AL (t) MS (c)        User Key  (r) = Recorded By, (t) = Taken By, (c) = Cosigned By    Initials Name Provider Type     Mita Montes, KATHRIN Physical Therapy Assistant     Mi Wallace, AGUSTIN/L Occupational Therapy Assistant    MS Leyva Rebekah R, PT, DPT, NCS Physical Therapist    Yani Roldan, OTR/L Occupational Therapist    AL Michael, Aj, PT Student PT Student           Time Calculation:   Time Calculation- OT     Row Name 08/10/19 1422 08/10/19 1128          Time Calculation- OT    OT Start Time  0920  -TS  --     OT Stop Time  1020  -TS  --     OT Time Calculation (min)  60 min  -TS  --     Total Timed Code Minutes- OT  60 minute(s)  -TS  --     OT Received On  08/10/19  -TS  --        Timed Charges    23643 - Gait Training Minutes   --  38  -     58409 - OT Self Care/Mgmt Minutes  60  -TS  --       User Key  (r) = Recorded By, (t) = Taken By, (c) = Cosigned By    Initials Name Provider Type     Mita Montes PTA Physical Therapy Assistant     Mi Wallace, AGUSTIN/L Occupational Therapy Assistant        Therapy Charges for Today     Code Description Service Date Service Provider Modifiers Qty    [id number]  OT SELF CARE/MGMT/TRAIN EA 15 MIN 8/10/2019 Mi Wallace COTA/L GO 4               Mi CAVANAUGH. VAMSI Wallace/BAYLEE  8/10/2019

## 2019-08-10 NOTE — PLAN OF CARE
Problem: Patient Care Overview  Goal: Plan of Care Review  Outcome: Ongoing (interventions implemented as appropriate)   08/10/19 1431   Coping/Psychosocial   Plan of Care Reviewed With patient   Plan of Care Review   Progress no change   OTHER   Outcome Summary Pt A&O X4. No c/o pain. NIH 7. VSS. PPP. NSR on TELE. Will continue to monitor.        Problem: Stroke (Ischemic) (Adult)  Goal: Signs and Symptoms of Listed Potential Problems Will be Absent, Minimized or Managed (Stroke)  Outcome: Outcome(s) achieved Date Met: 08/10/19

## 2019-08-10 NOTE — PROGRESS NOTES
[facility] Medicine Services  INPATIENT PROGRESS NOTE    Length of Stay: 2  Date of Admission: 8/8/2019  Primary Care Physician: Gordon Robles MD    Subjective     Chief Complaint:     Left-sided weakness    HPI     Patient's coordination is improved on the left side.  He continues to work with physical therapy and Occupational Therapy.  I discussed the patient's left vocal cord polyp with Dr. Ojeda who reviewed the patient's CT scan.  He is convinced that this represents a benign lesion and recommends following him up on an outpatient basis.  Neurosurgery has evaluated the patient with regard to the [place] aneurysm and surgical intervention is not indicated.  Improved blood pressure control is recommended.  Bed offer is pending at [facility].  TSH, folate and vitamin B12 are within normal limits.    Review of Systems     All pertinent negatives and positives are as above. All other systems have been reviewed and are negative unless otherwise stated.     Objective    Temp:  [97.5 °F (36.4 °C)-98.5 °F (36.9 °C)] 98.3 °F (36.8 °C)  Heart Rate:  [52-65] 59  Resp:  [14-16] 16  BP: (143-169)/(82-97) 143/97    Lab Results (last 24 hours)     Procedure Component Value Units Date/Time    Vitamin B12 [[id number]]  (Normal) Collected:  08/09/19 0437    Specimen:  Blood Updated:  08/09/19 1854     Vitamin B-12 368 pg/mL     Folate [[id number]]  (Normal) Collected:  08/09/19 0437    Specimen:  Blood Updated:  08/09/19 1854     Folate 10.30 ng/mL     TSH [[id number]]  (Normal) Collected:  08/09/19 0437    Specimen:  Blood Updated:  08/09/19 1820     TSH 1.660 mIU/mL           Imaging Results (last 24 hours)     ** No results found for the last 24 hours. **             Intake/Output Summary (Last 24 hours) at 8/10/2019 1226  Last data filed at 8/10/2019 1157  Gross per 24 hour   Intake 60 ml   Output 2900 ml   Net -2840 ml       Physical Exam    Constitutional: He is oriented to person,  place, and time. He appears well-developed and well-nourished. He is cooperative. No distress.   There is improved stereognosis on the left.  HENT:   Head: Normocephalic and atraumatic.   Right Ear: External ear normal.   Left Ear: External ear normal.   Nose: Nose normal.   Mouth/Throat: Oropharynx is clear and moist. Facial asymmetry is mild today.yes: Conjunctivae are normal.  No scleral icterus.   Neck: Normal range of motion. Neck supple. No JVD present.   Cardiovascular: Normal rate, regular rhythm, normal heart sounds and intact distal pulses.   No murmur heard.  Pulmonary/Chest: Effort normal. No respiratory distress.    Abdominal: Soft. Bowel sounds are normal. He exhibits no distension and no mass. There is no tenderness.   Musculoskeletal: He exhibits no edema, tenderness or deformity.   Neurological: He is alert and oriented to person, place, and time. He exhibits abnormal muscle tone (on left). Coordination (on left) abnormal.   Skin: Skin is warm and dry. No rash noted.   Psychiatric: He has a normal mood and affect.         Results Review:  I have reviewed the labs, radiology results, and diagnostic studies since my last progress note and made treatment changes reflective of the results.   I have reviewed the current medications.    Assessment/Plan     Active Hospital Problems    Diagnosis   • **CVA (cerebral vascular accident) (CMS/HCC)   • Vocal cord polyp   • Berry aneurysm   • Left hemiparesis (CMS/HCC)   • Benign essential HTN   • Medical non-compliance   • HLD (hyperlipidemia)   • Alcohol use disorder, moderate, dependence (CMS/HCC)       PLAN:  Add terazosin 5 mg p.o. daily to the antihypertensive regimen.  Continue PT/OT  Bed offer from [facility] rehab is pending  Medicaid is pending    Tay Collado DO   08/10/19   12:26 PM

## 2019-08-10 NOTE — PLAN OF CARE
Problem: Patient Care Overview  Goal: Plan of Care Review  Outcome: Ongoing (interventions implemented as appropriate)   08/10/19 3527   Coping/Psychosocial   Plan of Care Reviewed With patient   Plan of Care Review   Progress improving   OTHER   Outcome Summary Pt SBA for supine to sit with HOB elevated and bed. Pt transfers with CGA with RW. Pt provided verbal cues for slow steady movement. Pt ambulates with RW with CGA with increased time for placement of LLE and cues for  with LUE. Pt CGA for toileting task and LB bathing at side sink. Pt educated coordination activities for increased coordination with LUE. Pt would benefit from acute rehab. Continue OT POC

## 2019-08-10 NOTE — PLAN OF CARE
Problem: Patient Care Overview  Goal: Plan of Care Review  Outcome: Ongoing (interventions implemented as appropriate)   08/10/19 0333   Coping/Psychosocial   Plan of Care Reviewed With patient   Plan of Care Review   Progress improving   OTHER   Outcome Summary Pt is A&O X4. No complaints of pain during shift. Running normal sinus on tele. NIH = 7. Still some numbness on left side. VSS. Safety maintained        Problem: Stroke (Ischemic) (Adult)  Goal: Signs and Symptoms of Listed Potential Problems Will be Absent, Minimized or Managed (Stroke)  Outcome: Ongoing (interventions implemented as appropriate)      Problem: Pain, Chronic (Adult)  Goal: Identify Related Risk Factors and Signs and Symptoms  Outcome: Outcome(s) achieved Date Met: 08/10/19    Goal: Acceptable Pain/Comfort Level and Functional Ability  Outcome: Ongoing (interventions implemented as appropriate)

## 2019-08-11 PROCEDURE — 97116 GAIT TRAINING THERAPY: CPT

## 2019-08-11 PROCEDURE — 97530 THERAPEUTIC ACTIVITIES: CPT

## 2019-08-11 RX ORDER — AMLODIPINE BESYLATE 10 MG/1
10 TABLET ORAL
Status: DISCONTINUED | OUTPATIENT
Start: 2019-08-12 | End: 2019-08-13 | Stop reason: HOSPADM

## 2019-08-11 RX ADMIN — TERAZOSIN HYDROCHLORIDE 5 MG: 5 CAPSULE ORAL at 09:30

## 2019-08-11 RX ADMIN — SODIUM CHLORIDE, PRESERVATIVE FREE 3 ML: 5 INJECTION INTRAVENOUS at 09:30

## 2019-08-11 RX ADMIN — LORAZEPAM 1 MG: 1 TABLET ORAL at 19:05

## 2019-08-11 RX ADMIN — LORAZEPAM 1 MG: 1 TABLET ORAL at 09:42

## 2019-08-11 RX ADMIN — AMLODIPINE BESYLATE 5 MG: 5 TABLET ORAL at 09:30

## 2019-08-11 RX ADMIN — ASPIRIN 81 MG 81 MG: 81 TABLET ORAL at 09:30

## 2019-08-11 RX ADMIN — DESMOPRESSIN ACETATE 80 MG: 0.2 TABLET ORAL at 21:12

## 2019-08-11 RX ADMIN — SODIUM CHLORIDE, PRESERVATIVE FREE 3 ML: 5 INJECTION INTRAVENOUS at 21:13

## 2019-08-11 RX ADMIN — NICOTINE 1 PATCH: 14 PATCH, EXTENDED RELEASE TRANSDERMAL at 17:49

## 2019-08-11 NOTE — PROGRESS NOTES
[facility] Medicine Services  INPATIENT PROGRESS NOTE    Length of Stay: 3  Date of Admission: 8/8/2019  Primary Care Physician: Gordon Robles MD    Subjective     Chief Complaint:     Left-sided weakness    HPI     The patient continues to work with physical therapy.  Placement at acute rehab is still pending.   has sent them information and informed their admissions coordinator and disposition is pending a bed offer.  Signs are stable and the patient is afebrile.  He is alert, oriented and talkative.    Review of Systems     All pertinent negatives and positives are as above. All other systems have been reviewed and are negative unless otherwise stated.     Objective    Temp:  [97.8 °F (36.6 °C)-98.4 °F (36.9 °C)] 98.2 °F (36.8 °C)  Heart Rate:  [56-71] 69  Resp:  [16-18] 18  BP: (116-150)/(66-97) 150/82    Lab Results (last 24 hours)     ** No results found for the last 24 hours. **          Imaging Results (last 24 hours)     ** No results found for the last 24 hours. **             Intake/Output Summary (Last 24 hours) at 8/11/2019 1104  Last data filed at 8/11/2019 0623  Gross per 24 hour   Intake --   Output 1550 ml   Net -1550 ml       Physical Exam    Constitutional: He is oriented to person, place, and time. He appears well-developed and well-nourished. He is cooperative. No distress.     HENT:   Head: Normocephalic and atraumatic.   Right Ear: External ear normal.   Left Ear: External ear normal.   Nose: Nose normal.   Mouth/Throat: Oropharynx is clear and moist. Facial asymmetry with weakness of the left facial muscles continues to improve slowly.    Eyes: Conjunctivae are normal.  No scleral icterus.   Neck: Normal range of motion. Neck supple. No JVD present.   Cardiovascular: Normal rate, regular rhythm, normal heart sounds and intact distal pulses.   No murmur heard.  Pulmonary/Chest: Effort normal. No respiratory distress.     Abdominal: Soft. Bowel sounds are normal. He exhibits no distension and no mass. There is no tenderness.   Musculoskeletal: He exhibits no edema.  Decreased range of motion and muscle tone on the left.  Neurological: He is alert and oriented to person, place, and time. He exhibits abnormal muscle tone (on left). Coordination (on left) abnormal.   Skin: Skin is warm and dry. No rash noted.   Psychiatric: He has a normal mood and affect.         Results Review:  I have reviewed the labs, radiology results, and diagnostic studies since my last progress note and made treatment changes reflective of the results.   I have reviewed the current medications.    Assessment/Plan     Active Hospital Problems    Diagnosis   • **CVA (cerebral vascular accident) (CMS/HCC)   • Vocal cord polyp   • Berry aneurysm   • Left hemiparesis (CMS/HCC)   • Benign essential HTN   • Medical non-compliance   • HLD (hyperlipidemia)   • Alcohol use disorder, moderate, dependence (CMS/HCC)       PLAN:  Increase amlodipine to 10 mg p.o. Daily  Acute rehab placement is pending  Continue PT/OT    Tay Collado DO   08/11/19   11:04 AM

## 2019-08-11 NOTE — PLAN OF CARE
Problem: Patient Care Overview  Goal: Plan of Care Review  Outcome: Ongoing (interventions implemented as appropriate)   08/11/19 2576   Coping/Psychosocial   Plan of Care Reviewed With patient   Plan of Care Review   Progress no change   OTHER   Outcome Summary Pt A&O X4. NIH 4. [place] 8, meds given. Pt states headache is gone. [place] 4- tremoring. . TELE. Will continue to monitor.        Problem: Stroke (Ischemic) (Adult)  Goal: Signs and Symptoms of Listed Potential Problems Will be Absent, Minimized or Managed (Stroke)  Outcome: Ongoing (interventions implemented as appropriate)      Problem: Pain, Chronic (Adult)  Goal: Acceptable Pain/Comfort Level and Functional Ability  Outcome: Ongoing (interventions implemented as appropriate)      Problem: Skin Injury Risk (Adult)  Goal: Skin Health and Integrity  Outcome: Ongoing (interventions implemented as appropriate)

## 2019-08-11 NOTE — PLAN OF CARE
"Problem: Patient Care Overview  Goal: Plan of Care Review  Outcome: Ongoing (interventions implemented as appropriate)   08/11/19 0907   Coping/Psychosocial   Plan of Care Reviewed With patient   Plan of Care Review   Progress improving   OTHER   Outcome Summary VSS. No change in neuro status, NIH- 4. States that his vision issues seem to \" come and go\" No c/o pain voiced. Pt did c/o anxiety/agitiation/headache- CIWA -8 prn given, states it was helpful. Will monitor behavior. SCD on for part of shift, educated on ankle pumps. Safety maintained.       Problem: Stroke (Ischemic) (Adult)  Goal: Signs and Symptoms of Listed Potential Problems Will be Absent, Minimized or Managed (Stroke)  Outcome: Ongoing (interventions implemented as appropriate)      Problem: Pain, Chronic (Adult)  Goal: Acceptable Pain/Comfort Level and Functional Ability  Outcome: Ongoing (interventions implemented as appropriate)      Problem: Skin Injury Risk (Adult)  Goal: Identify Related Risk Factors and Signs and Symptoms  Outcome: Ongoing (interventions implemented as appropriate)    Goal: Skin Health and Integrity  Outcome: Ongoing (interventions implemented as appropriate)        "

## 2019-08-12 PROCEDURE — 25010000002 ONDANSETRON PER 1 MG: Performed by: FAMILY MEDICINE

## 2019-08-12 PROCEDURE — 97116 GAIT TRAINING THERAPY: CPT

## 2019-08-12 PROCEDURE — 97110 THERAPEUTIC EXERCISES: CPT

## 2019-08-12 PROCEDURE — 99231 SBSQ HOSP IP/OBS SF/LOW 25: CPT | Performed by: NURSE PRACTITIONER

## 2019-08-12 PROCEDURE — 97535 SELF CARE MNGMENT TRAINING: CPT

## 2019-08-12 RX ADMIN — TERAZOSIN HYDROCHLORIDE 5 MG: 5 CAPSULE ORAL at 08:54

## 2019-08-12 RX ADMIN — SODIUM CHLORIDE, PRESERVATIVE FREE 3 ML: 5 INJECTION INTRAVENOUS at 08:54

## 2019-08-12 RX ADMIN — SODIUM CHLORIDE, PRESERVATIVE FREE 3 ML: 5 INJECTION INTRAVENOUS at 20:38

## 2019-08-12 RX ADMIN — AMLODIPINE BESYLATE 10 MG: 10 TABLET ORAL at 08:54

## 2019-08-12 RX ADMIN — ACETAMINOPHEN 650 MG: 325 TABLET, FILM COATED ORAL at 09:33

## 2019-08-12 RX ADMIN — ONDANSETRON HYDROCHLORIDE 4 MG: 2 SOLUTION INTRAMUSCULAR; INTRAVENOUS at 20:38

## 2019-08-12 RX ADMIN — ACETAMINOPHEN 650 MG: 325 TABLET, FILM COATED ORAL at 16:08

## 2019-08-12 RX ADMIN — DESMOPRESSIN ACETATE 80 MG: 0.2 TABLET ORAL at 21:49

## 2019-08-12 RX ADMIN — NICOTINE 1 PATCH: 14 PATCH, EXTENDED RELEASE TRANSDERMAL at 17:21

## 2019-08-12 RX ADMIN — ASPIRIN 81 MG 81 MG: 81 TABLET ORAL at 08:54

## 2019-08-12 RX ADMIN — ALUMINUM HYDROXIDE, MAGNESIUM HYDROXIDE, AND DIMETHICONE 15 ML: 400; 400; 40 SUSPENSION ORAL at 12:10

## 2019-08-12 RX ADMIN — LORAZEPAM 1 MG: 1 TABLET ORAL at 21:59

## 2019-08-12 RX ADMIN — ALUMINUM HYDROXIDE, MAGNESIUM HYDROXIDE, AND DIMETHICONE 15 ML: 400; 400; 40 SUSPENSION ORAL at 18:24

## 2019-08-12 NOTE — PROGRESS NOTES
Continued Stay Note   [facility]     Patient Name: Olman Vaca  MRN: [id number]  Today's Date: 8/12/2019    Admit Date: 8/8/2019    Discharge Plan     Row Name 08/12/19 1113       Plan    Plan Comments  PT IS MEDICAID PENDING. [facility] UNTIL PT HAS A MEDICAID ID NUMBER. LEFT MESSAGE WITH VALERIE AT [facility] TO SEE IF THEY HAVE THE SAME PROTOCOL; AWAIT ANSWER FROM [facility].         Discharge Codes    No documentation.             XANDER Loo

## 2019-08-12 NOTE — PROGRESS NOTES
[facility]  INPATIENT PROGRESS NOTE    Length of Stay: 4  Date of Admission: 8/8/2019  Primary Care Physician: Gordon Robles MD    Subjective     Chief Complaint:     Left-sided weakness    HPI     Patient has no new complaints.  He continues to work fully with physical therapy and Occupational Therapy.  Acute rehab placement is pending.  The patient has received a Medicaid number and [facility] is evaluating the patient and a bed offer is pending.  Vital signs are stable.  The patient is afebrile, alert, oriented and talkative.    Review of Systems     All pertinent negatives and positives are as above. All other systems have been reviewed and are negative unless otherwise stated.     Objective    Temp:  [97.5 °F (36.4 °C)-98.2 °F (36.8 °C)] 97.8 °F (36.6 °C)  Heart Rate:  [55-84] 70  Resp:  [14-18] 16  BP: (118-170)/(64-99) 155/99    Lab Results (last 24 hours)     ** No results found for the last 24 hours. **          Imaging Results (last 24 hours)     ** No results found for the last 24 hours. **             Intake/Output Summary (Last 24 hours) at 8/12/2019 1553  Last data filed at 8/12/2019 0413  Gross per 24 hour   Intake 480 ml   Output 730 ml   Net -250 ml       Physical Exam  The patient's mother and sister are present in the room.  All questions that they had were answered fully.  Constitutional: He is oriented to person, place, and time. He appears well-developed and well-nourished. He is cooperative. No distress.     HENT:   Head: Normocephalic and atraumatic.   Nose: Nose normal.   Mouth/Throat: Oropharynx is clear and moist. Facial asymmetry with weakness of the left facial muscles continues to improve slowly.    Eyes: Conjunctivae are normal.  No scleral icterus.   Neck: Normal range of motion. Neck supple. No JVD present.   Cardiovascular: Normal rate, regular rhythm, normal heart sounds and intact distal pulses.   No  murmur heard.  Pulmonary/Chest: Effort normal. No respiratory distress.    Abdominal: Soft. Bowel sounds are normal. He exhibits no distension and no mass. There is no tenderness.   Musculoskeletal: He exhibits no edema.  Decreased range of motion and muscle tone on the left.  Neurological: He is alert and oriented to person, place, and time. He exhibits abnormal muscle tone (on left). Coordination (on left) abnormal.   Skin: Skin is warm and dry. No rash noted.   Psychiatric: He has a normal mood and affect.         Results Review:  I have reviewed the labs, radiology results, and diagnostic studies since my last progress note and made treatment changes reflective of the results.   I have reviewed the current medications.    Assessment/Plan     Active Hospital Problems    Diagnosis   • **CVA (cerebral vascular accident) (CMS/HCC)   • Vocal cord polyp   • Berry aneurysm   • Left hemiparesis (CMS/HCC)   • Benign essential HTN   • Medical non-compliance   • HLD (hyperlipidemia)   • Alcohol use disorder, moderate, dependence (CMS/HCC)       PLAN:  Acute rehab placement is pending  Continue PT/OT    Tay Collado DO   08/12/19   3:53 PM

## 2019-08-12 NOTE — PLAN OF CARE
Problem: Patient Care Overview  Goal: Plan of Care Review  Outcome: Ongoing (interventions implemented as appropriate)   08/12/19 1143   Coping/Psychosocial   Plan of Care Reviewed With patient   Plan of Care Review   Progress improving   OTHER   Outcome Summary PT tx completed.Pt supine in bed with no c/o. S bed mobility, CG/Leonel sit<>stand, amb 40' x 2 with r wx MiinA. Cues for gait sequence and mechanics. L UE/LE increased weakness. Benefit from rehab.

## 2019-08-12 NOTE — PAYOR COMM NOTE
"ADMIT INPT 8-8-19  UR  [phone]    Star Vaca (49 y.o. Male)     Date of Birth Social Security Number Address Home Phone MRN    1970  [address] [facility] [address]  [id number]    Holiness Marital Status          [facility] Single       Admission Date Admission Type Admitting Provider Attending Provider Department, Room/Bed    8/8/19 Urgent Tay Collado DO Robinson, Maurice S, DO [facility] 3A, 354/1    Discharge Date Discharge Disposition Discharge Destination                       Attending Provider:  Tay Collado DO    Allergies:  No Known Allergies    Isolation:  None   Infection:  None   Code Status:  CPR    Ht:  172 cm (67.72\")   Wt:  105 kg (231 lb 7.7 oz)    Admission Cmt:  None   Principal Problem:  CVA (cerebral vascular accident) (CMS/[facility]) [I63.9]                 Active Insurance as of 8/8/2019     Primary Coverage     Payor Plan Insurance Group Employer/Plan Group    HU[employer] MEDICAID HU[employer] [id number]     Payor Plan Address Payor Plan Phone Number Payor Plan Fax Number Effective Dates    PO  [phone]  8/8/2019 - None Entered    [place] [id number]       Subscriber Name Subscriber Birth Date Member ID       STAR VACA 1970 [id number]                 Emergency Contacts      (Rel.) Home Phone Work Phone Mobile Phone    SHARITA VACA (Father) -- -- [phone]               History & Physical      Tay Collado DO at 8/8/2019  2:59 PM              [facility] Medicine Services  HISTORY AND PHYSICAL    Date of Admission: 8/8/2019  Primary Care Physician: Gordon Robles MD    Subjective     Chief Complaint:   Dizzy, facial numbness, left arm and leg weakness    History of Present Illness    This 49-year-old white male was admitted with chief complaint of dizziness, facial numbness, left arm and leg weakness with numbnessand inability to appropriately use his left side.  " The patient's symptoms began last night at about midnight.  He did not seek assistance until today when he presented to [facility] emergency department for further treatment evaluation.  Patient has a history of essential hypertension and hyperlipidemia for which she has been prescribed medications in the past.  The patient has been noncompliant with treatment and has not taken those medications in years.    Work-up at [facility] as an EKG without significant abnormality, CT scan of the head showing calcification of the right vertebral artery and both carotid arteries but no other acute abnormalities.  Chest x-ray showed heart and upper limits of normal in size otherwise no acute abnormality.  CBC was within normal limits.  Urinalysis with within normal limits.  CMP within normal limits except for glucose of 133.  Cardiac markers were negative.  Brain natruretic peptide was elevated at 1230.    Review of Systems   Constitutional: Positive for activity change.   HENT: Negative.    Eyes: Negative.    Respiratory: Negative.    Cardiovascular: Negative.    Gastrointestinal: Negative.    Endocrine: Negative.    Genitourinary: Negative.    Musculoskeletal: Positive for gait problem.   Skin: Negative.    Allergic/Immunologic: Negative.    Neurological: Positive for dizziness, facial asymmetry (on left), weakness (on right) and numbness (on right).   Hematological: Negative.    Psychiatric/Behavioral: Negative.         Otherwise complete ROS reviewed and negative except as mentioned in the HPI.      Past Medical History:     Past Medical History:   Diagnosis Date   • Benign essential HTN 8/8/2019   • HLD (hyperlipidemia) 8/8/2019       Past Surgical History:  Past Surgical History:   Procedure Laterality Date   • FEMORAL LYMPH NODE BIOPSY/EXCISON     • MOUTH SURGERY         Family History:   family history includes Cancer in his mother; Hypertension in his mother; No Known Problems in his father;  "Stroke in his mother.    Social History:    reports that he has been smoking cigarettes.  He has a 42.00 pack-year smoking history. He has never used smokeless tobacco. He reports that he drinks about 42.0 oz of alcohol per week. He reports that he uses drugs. Drug: Marijuana. Frequency: 7.00 times per week.    Medications:  Prior to Admission medications    Not on File       Allergies:  Allergies not on file    Objective     Vital Signs:   /98 (BP Location: Left arm, Patient Position: Lying)   Pulse 54   Temp 97.9 °F (36.6 °C) (Oral)   Resp 18   Ht 170.2 cm (67\")   Wt 105 kg (231 lb 0.7 oz)   SpO2 95%   BMI 36.19 kg/m²      Physical Exam   Constitutional: He is oriented to person, place, and time. He appears well-developed and well-nourished. He is cooperative. No distress.   During the interview the patient ignored his left side.    HENT:   Head: Normocephalic and atraumatic.   Right Ear: External ear normal.   Left Ear: External ear normal.   Nose: Nose normal.   Mouth/Throat: Oropharynx is clear and moist.   Facial asymmetry with weakness of the left facial muscles.    Eyes: Conjunctivae and EOM are normal. Pupils are equal, round, and reactive to light. No scleral icterus.   Neck: Normal range of motion. Neck supple. No JVD present. Carotid bruit is not present. No tracheal deviation present.   Cardiovascular: Normal rate, regular rhythm, normal heart sounds and intact distal pulses.   No murmur heard.  Pulmonary/Chest: Effort normal. No respiratory distress. He has wheezes ( Scattered bilateral).   Abdominal: Soft. Bowel sounds are normal. He exhibits no distension and no mass. There is no tenderness.   Musculoskeletal: He exhibits no edema, tenderness or deformity.   Decreased range of motion on left side.   Neurological: He is alert and oriented to person, place, and time. He exhibits abnormal muscle tone (on left). Coordination (on left) abnormal.   Pronator drift on left.  Left facial droop. " Defer the remainder of the neurological exam to neurology   Skin: Skin is warm and dry. No rash noted.   Psychiatric: He has a normal mood and affect. His behavior is normal. Judgment and thought content normal.         Results Reviewed:  Lab Results (last 24 hours)     ** No results found for the last 24 hours. **          No results found.   I have personally reviewed and interpreted the radiology studies and ECG obtained at time of admission.     Assessment / Plan      Assessment & Plan  Active Hospital Problems    Diagnosis   • **CVA (cerebral vascular accident) (CMS/HCC)   • Left hemiparesis (CMS/HCC)   • Benign essential HTN   • Medical non-compliance   • HLD (hyperlipidemia)   • Alcohol use disorder, moderate, dependence (CMS/HCC)       PLAN:   Admit to the neurological floor  Cardiac monitoring  Stat MRI scan of the brain without contrast  Stat CT angiogram of the head and neck  Aspirin 81 mg p.o. daily  High-dose statin daily  PT/OT/ST evaluation and treatment  Permissive hypertension  Neurology consultation  SCDs for DVT prophylaxis  Thiamine 100 mg IV daily x3 doses  Ativan per CIWA protocol    Code Status:   No CPR     I discussed the patient's findings and my recommendations with: The patient    Estimated length of stay: Unknown    Tay Collado DO   08/08/19   3:24 PM                Electronically signed by Tay Collado DO at 8/8/2019  3:25 PM       Emergency Department Notes     No notes of this type exist for this encounter.        Hospital Medications (all)       Dose Frequency Start End    acetaminophen (TYLENOL) tablet 650 mg 650 mg Every 4 Hours PRN 8/8/2019     Sig - Route: Take 2 tablets by mouth Every 4 (Four) Hours As Needed for Mild Pain . - Oral    aluminum-magnesium hydroxide-simethicone (MAALOX MAX) 400-400-40 MG/5ML suspension 15 mL 15 mL Every 6 Hours PRN 8/8/2019     Sig - Route: Take 15 mL by mouth Every 6 (Six) Hours As Needed for Heartburn. - Oral    amLODIPine  "(NORVASC) tablet 10 mg 10 mg Every 24 Hours Scheduled 8/12/2019     Sig - Route: Take 1 tablet by mouth Daily. - Oral    aspirin chewable tablet 81 mg 81 mg Daily 8/8/2019     Sig - Route: Chew 1 tablet Daily. - Oral    Linked Group 1:  \"Or\" Linked Group Details        aspirin suppository 300 mg 300 mg Daily 8/8/2019     Sig - Route: Insert 1 suppository into the rectum Daily. - Rectal    Linked Group 1:  \"Or\" Linked Group Details        atorvastatin (LIPITOR) tablet 80 mg 80 mg Nightly 8/8/2019     Sig - Route: Take 2 tablets by mouth Every Night. - Oral    bisacodyl (DULCOLAX) EC tablet 5 mg 5 mg Daily PRN 8/8/2019     Sig - Route: Take 1 tablet by mouth Daily As Needed for Constipation. - Oral    iopamidol (ISOVUE-370) 76 % injection 150 mL 150 mL Once in Imaging 8/8/2019 8/8/2019    Sig - Route: Infuse 150 mL into a venous catheter Once. - Intravenous    labetalol (NORMODYNE,TRANDATE) injection 20 mg 20 mg Every 10 Minutes PRN 8/8/2019     Sig - Route: Infuse 4 mL into a venous catheter Every 10 (Ten) Minutes As Needed for High Blood Pressure (SBP > 220 with nicardipine at 15 mg/hr). - Intravenous    LORazepam (ATIVAN) injection 1 mg 1 mg Every 2 Hours PRN 8/8/2019 8/18/2019    Sig - Route: Infuse 0.5 mL into a venous catheter Every 2 (Two) Hours As Needed for Withdrawal (For CIWA-Ar 8-10). - Intravenous    Linked Group 2:  \"Or\" Linked Group Details        LORazepam (ATIVAN) injection 2 mg 2 mg Every 1 Hour PRN 8/8/2019 8/18/2019    Sig - Route: Infuse 1 mL into a venous catheter Every 1 (One) Hour As Needed for Withdrawal (For CIWA-Ar 11-15). - Intravenous    Linked Group 2:  \"Or\" Linked Group Details        LORazepam (ATIVAN) injection 2 mg 2 mg Every 15 Minutes PRN 8/8/2019 8/18/2019    Sig - Route: Infuse 1 mL into a venous catheter Every 15 (Fifteen) Minutes As Needed for Anxiety (For CIWA-Ar Greater Than 15.  Repeat Dose in 15 Minutes if CIWA-Ar Does Not Decrease). - Intravenous    Linked Group 2:  \"Or\" " "Linked Group Details        LORazepam (ATIVAN) injection 2 mg 2 mg Every 15 Minutes PRN 8/8/2019 8/18/2019    Sig - Route: Inject 1 mL into the appropriate muscle as directed by prescriber Every 15 (Fifteen) Minutes As Needed for Withdrawal (If Unable to Administer IV - For CIWA-Ar Greater Than 15.  Repeat Dose in 15 Minutes if CIWA-Ar Does Not Decrease). - Intramuscular    Linked Group 2:  \"Or\" Linked Group Details        LORazepam (ATIVAN) tablet 1 mg 1 mg Every 2 Hours PRN 8/8/2019 8/18/2019    Sig - Route: Take 1 tablet by mouth Every 2 (Two) Hours As Needed for Withdrawal (For CIWA-Ar 8-10). - Oral    Linked Group 2:  \"Or\" Linked Group Details        LORazepam (ATIVAN) tablet 2 mg 2 mg Every 1 Hour PRN 8/8/2019 8/18/2019    Sig - Route: Take 2 tablets by mouth Every 1 (One) Hour As Needed for Withdrawal (For CIWA-Ar 11-15). - Oral    Linked Group 2:  \"Or\" Linked Group Details        magnesium citrate solution 150 mL 150 mL Once 8/10/2019     Sig - Route: Take 150 mL by mouth 1 (One) Time. - Oral    melatonin tablet 3 mg 3 mg Nightly PRN 8/8/2019     Sig - Route: Take 1 tablet by mouth At Night As Needed for Sleep. - Oral    nicotine (NICODERM CQ) 14 MG/24HR patch 1 patch 1 patch Every 24 Hours 8/8/2019     Sig - Route: Place 1 patch on the skin as directed by provider Daily. - Transdermal    ondansetron (ZOFRAN) injection 4 mg 4 mg Every 6 Hours PRN 8/8/2019     Sig - Route: Infuse 2 mL into a venous catheter Every 6 (Six) Hours As Needed for Nausea or Vomiting. - Intravenous    perflutren (DEFINITY) lipid microspheres injection 8.476 mg 1.3 mL Once 8/8/2019 8/8/2019    Sig - Route: Infuse 1.3 mL into a venous catheter 1 (One) Time. - Intravenous    sennosides-docusate sodium (SENOKOT-S) 8.6-50 MG tablet 2 tablet 2 tablet 2 Times Daily 8/10/2019     Sig - Route: Take 2 tablets by mouth 2 (Two) Times a Day. - Oral    sodium chloride 0.9 % bolus 500 mL 500 mL Once 8/8/2019 8/8/2019    Sig - Route: Infuse 500 mL " into a venous catheter 1 (One) Time. - Intravenous    sodium chloride 0.9 % flush 3 mL 3 mL Every 12 Hours Scheduled 8/8/2019     Sig - Route: Infuse 3 mL into a venous catheter Every 12 (Twelve) Hours. - Intravenous    sodium chloride 0.9 % flush 3-10 mL 3-10 mL As Needed 8/8/2019     Sig - Route: Infuse 3-10 mL into a venous catheter As Needed for Line Care. - Intravenous    terazosin (HYTRIN) capsule 5 mg 5 mg Daily 8/10/2019     Sig - Route: Take 1 capsule by mouth Daily. - Oral    thiamine (B-1) 100 mg in sodium chloride 0.9 % 100 mL IVPB 100 mg Daily 8/8/2019 8/10/2019    Sig - Route: Infuse 100 mg into a venous catheter Daily. - Intravenous    traMADol (ULTRAM) tablet 50 mg 50 mg Every 6 Hours PRN 8/8/2019 8/18/2019    Sig - Route: Take 1 tablet by mouth Every 6 (Six) Hours As Needed for Moderate Pain . - Oral    amLODIPine (NORVASC) tablet 5 mg (Discontinued) 5 mg Every 24 Hours Scheduled 8/9/2019 8/11/2019    Sig - Route: Take 1 tablet by mouth Daily. - Oral    metoprolol succinate XL (TOPROL-XL) 24 hr tablet 50 mg (Discontinued) 50 mg Every 24 Hours Scheduled 8/10/2019 8/10/2019    Sig - Route: Take 1 tablet by mouth Daily. - Oral    sodium chloride 0.9 % infusion (Discontinued) 75 mL/hr Continuous 8/8/2019 8/9/2019    Sig - Route: Infuse 75 mL/hr into a venous catheter Continuous. - Intravenous          Lab Results (all)     Procedure Component Value Units Date/Time    Vitamin B12 [[id number]]  (Normal) Collected:  08/09/19 0437    Specimen:  Blood Updated:  08/09/19 1854     Vitamin B-12 368 pg/mL     Folate [[id number]]  (Normal) Collected:  08/09/19 0437    Specimen:  Blood Updated:  08/09/19 1854     Folate 10.30 ng/mL     TSH [[id number]]  (Normal) Collected:  08/09/19 0437    Specimen:  Blood Updated:  08/09/19 1820     TSH 1.660 mIU/mL     POC Glucose Once [[id number]]  (Normal) Collected:  08/09/19 1200    Specimen:  Blood Updated:  08/09/19 1212     Glucose 95 mg/dL      Comment: : [id number]  Omar Lieberman ID: [id number]       POC Glucose Once [[id number]]  (Normal) Collected:  08/09/19 0824    Specimen:  Blood Updated:  08/09/19 0840     Glucose 101 mg/dL      Comment: : [id number] Omar Lieberman ID: [id number]       Hemoglobin A1c [[id number]]  (Normal) Collected:  08/09/19 0437    Specimen:  Blood Updated:  08/09/19 0611     Hemoglobin A1C 5.7 %     Narrative:       Less than 6.0           Non-Diabetic Range  6.0-7.0                 ADA Therapeutic Target  Greater than 7.0        Action Suggested    Lipid Panel [[id number]]  (Abnormal) Collected:  08/09/19 0437    Specimen:  Blood Updated:  08/09/19 0554     Total Cholesterol 207 mg/dL      Triglycerides 161 mg/dL      HDL Cholesterol 38 mg/dL      LDL Cholesterol  133 mg/dL      LDL/HDL Ratio 3.60          Imaging Results (all)     Procedure Component Value Units Date/Time    CT Angiogram Head With & Without Contrast [[id number]] Collected:  08/08/19 1927     Updated:  08/08/19 1941    Narrative:       EXAMINATION: CT angiogram of the head with contrast dated 08/20/2019     DOSE: 504 mGycm. Automated exposure control was utilized to diminish  patient radiation dose..     HISTORY: Stroke symptoms     FINDINGS: Multiple contiguous axial images are obtained through the  [place] 1 mm intervals findings contrast ministration with  reformatted images obtained in the sagittal and coronal projections from  the original data set.     The right vertebral artery is dominant. There is mild plaquing with  associated stenosis of the proximal intracranial aspect of the right  vertebral artery. Both distal vertebral arteries are otherwise widely  patent to the level the basilar artery.     The basilar artery is normal in caliber. Both superior cerebellar  arteries as well as the left posterior cerebral artery are normal in  caliber and appearance. There is some attenuation within the P2 segment  of the right posterior cerebral artery suggesting  some underlying  steno-occlusive disease. This is in the same segment as the patient's  acute infarct.     The distal internal carotid arteries are widely patent. There is mild  calcific plaquing involving the cavernous and supraclinoid aspect of  both ICAs without evidence of rate limiting stenosis. There is a  hypoplastic A1 segment of the right anterior cerebral artery. A 6 mm  aneurysm is noted at the right MCA trifurcation. The anterior and middle  cerebral arteries are otherwise widely patent. No additional aneurysms  are identified.       Impression:       1.. Attenuation suggesting steno-occlusive disease of the P2 segment of  the right posterior cerebral artery. There is mild calcific plaquing  involving the proximal intracranial aspect of the right vertebral  artery. The posterior circulation is otherwise unremarkable.  2. 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 There is a hypoplastic A1 segment of the right anterior  cerebral artery. The anterior circulation is otherwise unremarkable.  3. The dural venous sinuses are unremarkable with no evidence of dural  venous sinus thrombosis.  This report was finalized on 08/08/2019 19:38 by Dr. Amado Escalona MD.    CT Angiogram Neck With & Without Contrast [[id number]] Collected:  08/08/19 1810     Updated:  08/08/19 1826    Narrative:       EXAMINATION: CT angiogram of the neck with contrast 08/08/2019     DOSE: 504 mGycm. Automated exposure control was utilized to diminish  patient radiation dose..     HISTORY: Stroke.     FINDINGS: Multiple contiguous axial and obtained from the aortic arch  through the skull base at 1 mm intervals following intravenous contrast  administration with reformatted images obtained in the sagittal and  coronal projections from the original dataset as well as MIPS.     The study is degraded by motion. There is a calcified granuloma within  the right upper lobe. Lung apices are otherwise clear. The  superior  mediastinum is unremarkable. There is mild tortuosity of the proximal  great vessels.     The thyroid gland is homogeneous in density without evidence of focal  nodule or mass. No evidence of adenopathy in the supraclavicular fossa.  The level of the true and false cords is remarkable for asymmetric  nodule which appears to be associated with the left vocal fold. This  measures approximately 5 mm in size and direct visualization is  recommended. There are some mildly prominent left posterior triangle  nodes including an 8 mm short axis node. Smaller nodes are noted within  the jugular chain bilaterally. The epiglottis and aryepiglottic folds  are unremarkable. The nasopharynx and parapharyngeal spaces are  unremarkable with no evidence of mass or mass effect.     There is some mild plaquing involving the innominate artery on the  right. The origin of the great vessels are otherwise unremarkable  including the vertebral arteries although again partially obscured  secondary to motion artifact. The right vertebral artery is dominant.     Both vertebral arteries are widely patent in their extracranial aspect.  There is calcific plaquing with mild associated stenosis of the proximal  intracranial segment of the right vertebral artery.     The right common carotid artery is widely patent to the level of the  carotid bifurcation. There is mild calcific plaquing involving the  carotid bulb and proximal aspect of the ICA. The more distal right ICA  is tortuous. There is very slight narrowing of the proximal segment of  the right ICA but with a less than 20% cross-sectional narrowing. The  more distal right ICA is widely patent.     The left common carotid artery is widely patent. There is mild calcific  plaquing involving the carotid bulb and proximal aspect of the ICA again  resulting in only a very mild stenosis with a less than 20%  cross-sectional narrowing of the lumen of the vessel. The more distal  left ICA  is tortuous but otherwise widely patent.       Impression:       1. There is mild calcific plaquing and noncalcific plaque involving the  carotid bulb and proximal aspect of both internal carotid arteries. This  is not resulting in a hemodynamically significant stenosis with a less  than 20% cross-sectional narrowing noted of both ICAs. The more distal  ICAs are tortuous but otherwise widely patent.  2. Minimal calcific plaquing involving the proximal right innominate.  The origin of the great vessels are otherwise unremarkable.  3. Right vertebral artery is dominant. Both vertebral arteries are  widely patent in their extracranial aspect. There is some mild calcific  plaquing with mild associated stenosis of the proximal intracranial  aspect of the right vertebral artery.  4. There is a polypoid lesion which appears to be associated with the  left vocal fold. This would warrant follow-up with direct visualization  following ENT consultation.  This report was finalized on 08/08/2019 18:23 by Dr. Amado Escalona MD.    MRI Brain Without Contrast [[id number]] Collected:  08/08/19 1656     Updated:  08/08/19 1700    Narrative:       EXAM: MR BRAIN WITHOUT IV CONTRAST 08/08/2019     COMPARISON: None      HISTORY: 49 years-old Male. Stroke      TECHNIQUE:   Routine pulse sequences of the brain were obtained without IV contrast.      REPORT:     There is diffusion restriction within the right thalamus and medial to  the lobe, with associated T2/flair abnormal signal, consistent with  acute right PCA infarct. There is no hemorrhagic conversion.     No acute hydrocephalus. No midline shift. No suspicious extra fluid  collection. Mild chronic microvascular changes. Mild-to-moderate  cerebral volume loss.     Mastoid air cells are clear. The paranasal sinuses are free of  obstructive mucosal disease. The intraorbital structures are  unremarkable. The basal cisterns are preserved.                   Impression:       1.  Acute  extensive infarct in the right PCA territory.  This report was finalized on 2019 16:57 by Dr. Gwen Conley MD.          Orders (all)     Start     Ordered    19 0900  amLODIPine (NORVASC) tablet 10 mg  Every 24 Hours Scheduled      19 1020    08/10/19 1315  terazosin (HYTRIN) capsule 5 mg  Daily      08/10/19 1223    08/10/19 1300  sennosides-docusate sodium (SENOKOT-S) 8.6-50 MG tablet 2 tablet  2 Times Daily      08/10/19 1203    08/10/19 1300  magnesium citrate solution 150 mL  Once      08/10/19 1203    08/10/19 1300  metoprolol succinate XL (TOPROL-XL) 24 hr tablet 50 mg  Every 24 Hours Scheduled,   Status:  Discontinued      08/10/19 1204    19 1740  Vitamin B12  Once      19 1730    19 1740  Folate  Once      19 1730    19 1740  TSH  Once      19 1730    19 1430  amLODIPine (NORVASC) tablet 5 mg  Every 24 Hours Scheduled,   Status:  Discontinued      19 1340    19 1348  Case Management  Consult  Once     Provider:  (Not yet assigned)    19 1347    19 1345  Inpatient ENT Consult  Once,   Status:  Canceled     Specialty:  Otolaryngology  Provider:  Bishop Dela Cruz MD    19 1344    19 1319  Code Status and Medical Interventions:  Continuous      19 1318    19 1307  PT Plan of Care Cert / Re-Cert  Once     Comments:  Physical Therapy Plan of Care  Initial Certification  Certification Period: 2019 - 2019    Patient Name: Olman Vaca  : 1970    (Z74.09) Impaired functional mobility, balance, gait, and endurance  (Z74.09) Impaired mobility and ADLs                  Assessment  PT Assessment  Impairments Found (describe specific impairments): aerobic capacity/endurance, gait, locomotion, and balance, muscle performance, motor function  Criteria for Skilled Interventions Met (PT Clinical Impression): yes        Rehab Goal Summary     Row Name 19 0947 19  0946          Physical Therapy Goals    Bed Mobility Goal Selection (PT)  bed mobility, PT goal 1  -MS (r) AL (t)   MS (c)  -     Transfer Goal Selection (PT)  transfer, PT goal 1  -MS (r) AL (t) MS (c)    -     Gait Training Goal Selection (PT)  gait training, PT goal 1  -MS (r) AL   (t) MS (c)  -     Balance Goal Selection (PT)  balance, PT goal 1  -MS (r) AL (t) MS (c)  -          Bed Mobility Goal 1 (PT)    Activity/Assistive Device (Bed Mobility Goal 1, PT)  sit to supine;supine   to sit  -MS (r) AL (t) MS (c)  -     [place] Level/Cues Needed (Bed Mobility Goal 1, PT)  conditional   independence  -MS (r) AL (t) MS (c)  -     Time Frame (Bed Mobility Goal 1, PT)  long term goal (LTG);by discharge    -MS (r) AL (t) MS (c)  -     Progress/Outcomes (Bed Mobility Goal 1, PT)  goal ongoing  -MS (r) AL (t)   MS (c)  -        Transfer Goal 1 (PT)    Activity/Assistive Device (Transfer Goal 1, PT)    sit-to-stand/stand-to-sit;bed-to-chair/chair-to-bed;walker, rolling  -MS   (r) AL (t) MS (c)  -     [place] Level/Cues Needed (Transfer Goal 1, PT)  supervision   required  -MS (r) AL (t) MS (c)  -     Time Frame (Transfer Goal 1, PT)  long term goal (LTG);by discharge  -MS   (r) AL (t) MS (c)  -     Progress/Outcome (Transfer Goal 1, PT)  goal ongoing  -MS (r) AL (t) MS   (c)  -        Gait Training Goal 1 (PT)    Activity/Assistive Device (Gait Training Goal 1, PT)  gait (walking   locomotion);assistive device use;backward stepping;decrease fall   risk;forward stepping;improve balance and speed;increase endurance/gait   distance;normalize weight shifts;sidestepping;turning, left;turning,   right;walker, rolling  -MS (r) AL (t) MS (c)  -     [place] Level (Gait Training Goal 1, PT)  minimum assist (75% or   more patient effort);1 person assist  -MS (r) AL (t) MS (c)  -     Distance (Gait Goal 1, PT)  Pt will ambulate 50ft on even surfaces with   improve motor planning of L LE.   -MS (r) AL (t) MS (c)  -      Time Frame (Gait Training Goal 1, PT)  long term goal (LTG);by discharge    -MS (r) AL (t) MS (c)  -     Progress/Outcome (Gait Training Goal 1, PT)  goal ongoing  -MS (r) AL (t)   MS (c)  -        Balance Goal 1 (PT)    Activity/Assistive Device (Balance Goal 1, PT)  sitting,   dynamic;standing, static;standing, dynamic;walker, rolling  -MS (r) AL (t)   MS (c)  -     [place] Level/Cues Needed (Balance Goal 1, PT)  contact guard assist    -MS (r) AL (t) MS (c)  -     Time Frame (Balance Goal 1, PT)  long term goal (LTG);by discharge  -MS   (r) AL (t) MS (c)  -     Progress/Outcomes (Balance Goal 1, PT)  goal ongoing  -MS (r) AL (t) MS   (c)  -        Occupational Therapy Goals    Transfer Goal Selection (OT)  -  transfer, OT goal 1  -MW     Balance Goal Selection (OT)  -  balance, OT goal 1  -MW     Coordination Goal Selection (OT)  -  coordination, OT goal 1  -MW        Transfer Goal 1 (OT)    Activity/Assistive Device (Transfer Goal 1, OT)  -    sit-to-stand/stand-to-sit;bed-to-chair/chair-to-bed;toilet  -MW     [place] Level/Cues Needed (Transfer Goal 1, OT)  -  contact guard   assist  -MW     Time Frame (Transfer Goal 1, OT)  -  long term goal (LTG);by discharge    -MW     Progress/Outcome (Transfer Goal 1, OT)  -  goal ongoing  -MW        Balance Goal 1 (OT)    Activity/Assistive Device (Balance Goal 1, OT)  -  sitting,   dynamic;standing, static;standing, dynamic in context of 8 min   ADL/functional reaching task  -MW     [place] Level/Cues Needed (Balance Goal 1, OT)  -  contact guard   assist  -MW     Time Frame (Balance Goal 1, OT)  -  long term goal (LTG);by discharge    -MW     Progress/Outcomes (Balance Goal 1, OT)  -  goal ongoing  -MW        Coordination Goal 1 (OT)    Activity/Assistive Device (Coordination Goal 1, OT)  -  FM task;GM   task;FM written ex program;GM written ex program  -MW     [place] Level/Cues Needed (Coordination Goal 1, OT)  -  standby   assist  -MW     Time  Frame (Coordination Goal 1, OT)  -  long term goal (LTG);by   discharge  -MW     Progress/Outcomes (Coordination Goal 1, OT)  -  goal ongoing  -MW       User Key  (r) = Recorded By, (t) = Taken By, (c) = Cosigned By    Initials Name Provider Type Discipline    Rebekah Wise WESLEY, PT, DPT, NCS Physical Therapist PT    MW Yani Mcgowan, OTR/L Occupational Therapist OT    Hernan Crawley, PT Student PT Student PT      PT OP Goals     Row Name 08/09/19 1110          Time Calculation    PT Goal Re-Cert Due Date  08/19/19  -MS (r) AL (t) MS (c)       User Key  (r) = Recorded By, (t) = Taken By, (c) = Cosigned By    Initials Name Provider Type    Rebekah Wise WESLEY, PT, DPT, NCS Physical Therapist    Hernan Crawley, PT Student PT Student            Plan  PT Plan  Therapy Frequency (PT Clinical Impression): 2 times/day  Planned Therapy Interventions (PT Eval): balance training, bed mobility training, gait training, patient/family education, strengthening, transfer training  Outcome Summary: PT eval completed. Pt was Ox4 and pleasant during session. He performed bed mobility with CGA and OOB with min A x2. He presents with impaired motor planning with ambulating unable to bend L LE with L swing phase, impaired L UE and LE coordination duing tasks, presents with L trunk lean that patient is aware of but needs verbal cues and encouragement to correct, impaired balance, and visual problems with decreased peripheral vision and saccades with fast eye movements laterally. He would benefit from continued skilled therapy to work on improving balance in sitting and standing to reduce fall risk, to work on coordination to with L LE and UE task to make patient more stable during mobility, and to work on midline orientation in sitting and standing. PT recommends inpatient rehab for d/c. If he can't tolerate 3 hours of therapy, SNF for d/c.          Rebekah Leyva, PT, DPT, NCS  8/9/2019            By cosigning this order, either  electronically or physically, I certify that the therapy services are furnished while this patient is under my care, the services outline above are required by this patient, and will be reviewed every 90 days.        M.D.:__________________________________________ Date: ______________    19 1306    19 1304  Inpatient ENT Consult  Once,   Status:  Canceled     Specialty:  Otolaryngology  Provider:  (Not yet assigned)    19 1304    19 1302  Inpatient Neurosurgery Consult  Once     Specialty:  Neurosurgery  Provider:  Esequiel Encinas MD    19 1302    19 1213  POC Glucose Once  Once      19 1200    19 1122  OT Plan of Care Cert / Re-Cert  Once     Comments:  Occupational Therapy Plan of Care  Initial Certification  Certification Period: 2019 - 2019    Patient Name: Olman Vaca  : 1970    (Z74.09) Impaired functional mobility, balance, gait, and endurance  (Z74.09) Impaired mobility and ADLs                Assessment  OT Assessment  OT Diagnosis: decreased ADL  Rehab Potential (OT Eval): good, to achieve stated therapy goals  Criteria for Skilled Therapeutic Interventions Met (OT Eval): yes, treatment indicated        Rehab Goal Summary     Row Name 19 0947 19 0946          Physical Therapy Goals    Bed Mobility Goal Selection (PT)  bed mobility, PT goal 1  (Pended)   -AL    -     Transfer Goal Selection (PT)  transfer, PT goal 1  (Pended)   -AL  -     Gait Training Goal Selection (PT)  gait training, PT goal 1  (Pended)     -AL  -     Balance Goal Selection (PT)  balance, PT goal 1  (Pended)   -AL  -        Bed Mobility Goal 1 (PT)    Activity/Assistive Device (Bed Mobility Goal 1, PT)  sit to supine;supine   to sit  (Pended)   -AL  -     [place] Level/Cues Needed (Bed Mobility Goal 1, PT)  conditional   independence  (Pended)   -AL  -     Time Frame (Bed Mobility Goal 1, PT)  long term goal (LTG);by discharge    (Pended)   -AL  -      Progress/Outcomes (Bed Mobility Goal 1, PT)  goal ongoing  (Pended)   -AL    -        Transfer Goal 1 (PT)    Activity/Assistive Device (Transfer Goal 1, PT)    sit-to-stand/stand-to-sit;bed-to-chair/chair-to-bed;walker, rolling    (Pended)   -AL  -     [place] Level/Cues Needed (Transfer Goal 1, PT)  supervision   required  (Pended)   -AL  -     Time Frame (Transfer Goal 1, PT)  long term goal (LTG);by discharge    (Pended)   -AL  -     Progress/Outcome (Transfer Goal 1, PT)  goal ongoing  (Pended)   -AL  -        Gait Training Goal 1 (PT)    Activity/Assistive Device (Gait Training Goal 1, PT)  gait (walking   locomotion);assistive device use;backward stepping;decrease fall   risk;forward stepping;improve balance and speed;increase endurance/gait   distance;normalize weight shifts;sidestepping;turning, left;turning,   right;walker, rolling  (Pended)   -AL  -     [place] Level (Gait Training Goal 1, PT)  minimum assist (75% or   more patient effort);1 person assist  (Pended)   -AL  -     Distance (Gait Goal 1, PT)  Pt will ambulate 50ft on even surfaces with   improve motor planning of L LE.   (Pended)   -AL  -     Time Frame (Gait Training Goal 1, PT)  long term goal (LTG);by discharge    (Pended)   -AL  -     Progress/Outcome (Gait Training Goal 1, PT)  goal ongoing  (Pended)   -AL    -        Balance Goal 1 (PT)    Activity/Assistive Device (Balance Goal 1, PT)  sitting,   dynamic;standing, static;standing, dynamic;walker, rolling  (Pended)   -AL    -     [place] Level/Cues Needed (Balance Goal 1, PT)  contact guard assist    (Pended)   -AL  -     Time Frame (Balance Goal 1, PT)  long term goal (LTG);by discharge    (Pended)   -AL  -     Progress/Outcomes (Balance Goal 1, PT)  goal ongoing  (Pended)   -AL  -        Occupational Therapy Goals    Transfer Goal Selection (OT)  -  transfer, OT goal 1  -MW     Balance Goal Selection (OT)  -  balance, OT goal 1  -MW     Coordination Goal Selection  (OT)  -  coordination, OT goal 1  -MW        Transfer Goal 1 (OT)    Activity/Assistive Device (Transfer Goal 1, OT)  -    sit-to-stand/stand-to-sit;bed-to-chair/chair-to-bed;toilet  -MW     [place] Level/Cues Needed (Transfer Goal 1, OT)  -  contact guard   assist  -MW     Time Frame (Transfer Goal 1, OT)  -  long term goal (LTG);by discharge    -MW     Progress/Outcome (Transfer Goal 1, OT)  -  goal ongoing  -MW        Balance Goal 1 (OT)    Activity/Assistive Device (Balance Goal 1, OT)  -  sitting,   dynamic;standing, static;standing, dynamic in context of 8 min   ADL/functional reaching task  -MW     [place] Level/Cues Needed (Balance Goal 1, OT)  -  contact guard   assist  -MW     Time Frame (Balance Goal 1, OT)  -  long term goal (LTG);by discharge    -MW     Progress/Outcomes (Balance Goal 1, OT)  -  goal ongoing  -MW        Coordination Goal 1 (OT)    Activity/Assistive Device (Coordination Goal 1, OT)  -  FM task;GM   task;FM written ex program;GM written ex program  -MW     [place] Level/Cues Needed (Coordination Goal 1, OT)  -  standby   assist  -MW     Time Frame (Coordination Goal 1, OT)  -  long term goal (LTG);by   discharge  -MW     Progress/Outcomes (Coordination Goal 1, OT)  -  goal ongoing  -MW       User Key  (r) = Recorded By, (t) = Taken By, (c) = Cosigned By    Initials Name Provider Type Discipline    MW Yani Mcgowan, OTR/L Occupational Therapist OT    Hernan Crawley, PT Student PT Student PT        OT Goals     Row Name 08/09/19 1119          Time Calculation    OT Goal Re-Cert Due Date  08/19/19  -MW       User Key  (r) = Recorded By, (t) = Taken By, (c) = Cosigned By    Initials Name Provider Type    MW Yani Mcgowan, OTR/L Occupational Therapist          Plan    OT Plan  Therapy Frequency (OT Eval): 5 times/wk  Predicted Duration of Therapy Intervention (Therapy Eval): until d/c  Outcome Summary: OT eval completed. Pt demos decreased FMC/GMC and strength LUE compared to  RUE. CGA-Alia for LB dressing task w 1 instance LOB to L in sitting. Pt stands CGA-minAx2, demos L and posterior lean in standing. Balance improves with use of RW but demos decreased motor planning LLE with taking steps near bed. Pt with hypersensitivity to light touch and pain sensation posterior upper arm. Pt reports blurry vision, saccades present with tracking. Skilled OT required to address stated deficits to increase pt independence and safety to reduce fall risk. Recommend d/c to inpatient rehab.      Yani Mcgowan, OTR/L  8/9/2019        By cosigning this order, either electronically or physically, I certify that the therapy services are furnished while this patient is under my care, the services outline above are required by this patient, and will be reviewed every 90 days.        M.D.:__________________________________________ Date: ______________    08/09/19 1121    08/09/19 0841  POC Glucose Once  Once      08/09/19 0824    08/09/19 0600  Hemoglobin A1c  Morning Draw      08/08/19 1458    08/09/19 0600  Lipid Panel  Morning Draw      08/08/19 1458    08/08/19 2100  sodium chloride 0.9 % flush 3 mL  Every 12 Hours Scheduled      08/08/19 1458    08/08/19 2100  atorvastatin (LIPITOR) tablet 80 mg  Nightly      08/08/19 1458    08/08/19 1845  perflutren (DEFINITY) lipid microspheres injection 8.476 mg  Once      08/08/19 1748    08/08/19 1800  POC Glucose Q6H  Every 6 Hours,   Status:  Canceled     Comments:  May Discontinue After 2 Consecutive Readings Less Than 140Notify Provider if 2 Readings Greater Than 140      08/08/19 1458    08/08/19 1800  nicotine (NICODERM CQ) 14 MG/24HR patch 1 patch  Every 24 Hours      08/08/19 1708    08/08/19 1800  iopamidol (ISOVUE-370) 76 % injection 150 mL  Once in Imaging      08/08/19 1711    08/08/19 1615  thiamine (B-1) 100 mg in sodium chloride 0.9 % 100 mL IVPB  Daily      08/08/19 1517    08/08/19 1615  sodium chloride 0.9 % bolus 500 mL  Once      08/08/19 1521     08/08/19 1615  sodium chloride 0.9 % infusion  Continuous,   Status:  Discontinued      08/08/19 1521    08/08/19 1600  Vital Signs  Every 4 Hours      08/08/19 1458    08/08/19 1600  Intake and Output  Every 4 Hours      08/08/19 1458    08/08/19 1559  Diet Regular  Diet Effective Now      08/08/19 1558    08/08/19 1545  aspirin chewable tablet 81 mg  Daily      08/08/19 1458    08/08/19 1545  aspirin suppository 300 mg  Daily      08/08/19 1458    08/08/19 1517  CT Angiogram Neck With & Without Contrast  1 Time Imaging      08/08/19 1516    08/08/19 1516  CT Angiogram Head With & Without Contrast  1 Time Imaging      08/08/19 1516    08/08/19 1458  MRI Angiogram Head Without Contrast  1 Time Imaging,   Status:  Canceled      08/08/19 1458    08/08/19 1457  MRI Brain Without Contrast  1 Time Imaging      08/08/19 1458    08/08/19 1455  US Carotid Bilateral  1 Time Imaging,   Status:  Canceled      08/08/19 1458    08/08/19 1454  aluminum-magnesium hydroxide-simethicone (MAALOX MAX) 400-400-40 MG/5ML suspension 15 mL  Every 6 Hours PRN      08/08/19 1458    08/08/19 1454  ondansetron (ZOFRAN) injection 4 mg  Every 6 Hours PRN      08/08/19 1458    08/08/19 1454  bisacodyl (DULCOLAX) EC tablet 5 mg  Daily PRN      08/08/19 1458    08/08/19 1454  melatonin tablet 3 mg  Nightly PRN      08/08/19 1458    08/08/19 1454  traMADol (ULTRAM) tablet 50 mg  Every 6 Hours PRN      08/08/19 1458    08/08/19 1454  acetaminophen (TYLENOL) tablet 650 mg  Every 4 Hours PRN      08/08/19 1458    08/08/19 1454  Adult Transthoracic Echo Complete W/ Cont if Necessary Per Protocol (With Agitated Saline)  Once      08/08/19 1458    08/08/19 1453  labetalol (NORMODYNE,TRANDATE) injection 20 mg  Every 10 Minutes PRN      08/08/19 1458    08/08/19 1453  LORazepam (ATIVAN) tablet 1 mg  Every 2 Hours PRN      08/08/19 1458    08/08/19 1453  LORazepam (ATIVAN) injection 1 mg  Every 2 Hours PRN      08/08/19 1458    08/08/19 1453  LORazepam  (ATIVAN) tablet 2 mg  Every 1 Hour PRN      08/08/19 1458    08/08/19 1453  LORazepam (ATIVAN) injection 2 mg  Every 1 Hour PRN      08/08/19 1458    08/08/19 1453  LORazepam (ATIVAN) injection 2 mg  Every 15 Minutes PRN      08/08/19 1458    08/08/19 1453  LORazepam (ATIVAN) injection 2 mg  Every 15 Minutes PRN      08/08/19 1458    08/08/19 1453  Inpatient Neurology Consult Stroke  Once     Specialty:  Neurology  Provider:  Rima Gomes MD    08/08/19 1458    08/08/19 1452  Place Sequential Compression Device  Once      08/08/19 1458    08/08/19 1452  Maintain Sequential Compression Device  Continuous      08/08/19 1458    08/08/19 1452  Place Sequential Compression Device  Once      08/08/19 1458    08/08/19 1452  Maintain Sequential Compression Device  Continuous      08/08/19 1458    08/08/19 1451  Assessed for Rehabilitation Services  Once      08/08/19 1458    08/08/19 1451  Reason for Not Administering IV Thrombolytic  Once      08/08/19 1458    08/08/19 1451  Vital Signs Per Hospital Policy  Per Order Details     Comments:  For ICU Admission: Vital Signs Every 2 Hours  For Telemetry Unit Admission: Vital Signs Every 4 Hours  Keep Systolic Blood Pressure Less Than 220, Diastolic Blood Pressure Less Than 110    08/08/19 1458    08/08/19 1451  Pulse Oximetry, Continuous  Continuous      08/08/19 1458    08/08/19 1451  Cardiac Monitoring  Continuous      08/08/19 1458    08/08/19 1451  Turn Patient  Now Then Every 2 Hours      08/08/19 1458    08/08/19 1451  Intake & Output  Every Shift      08/08/19 1458    08/08/19 1451  Weigh Patient  Once      08/08/19 1458    08/08/19 1451  Neuro Checks  Per Order Details     Comments:  For ICU Admission: Neuro Checks to Include All Stroke Deficits Every Hour x10 Hours Then Every 2 Hours  For Telemetry Unit Admission: Neuro Checks to Include All Stroke Deficits Every 4 Hours    08/08/19 1458    08/08/19 1451  NIHSS Assessment  Every Shift     Comments:  Turn off  all sedation medications prior to performing assessment. Assessment to be performed upon admission, transfer to another unit, discharge, and with neurological decline. If NIHSS change is greater than or equal to 4 and/or neurological decline is noted notify physician.    08/08/19 1458    08/08/19 1451  Provide Stroke Education Material  Prior to Discharge     Comments:  Educate patient PRN and daily during hospitalization.    08/08/19 1458    08/08/19 1451  Nursing Dysphagia Screening (Complete Prior to Giving Anything By Mouth)  Once      08/08/19 1458    08/08/19 1451  RN to Place Order SLP Consult - Eval & Treat Choosing Reason of RN Dysphagia Screen Failed  Per Order Details     Comments:  RN to Place Order SLP Consult - Eval & Treat Choosing Reason of RN Dysphagia Screen Failed    08/08/19 1458    08/08/19 1451  Nurse to Call MD or Nutrition Services for Diet if Patient Passes Dysphagia Screen  Once      08/08/19 1458    08/08/19 1451  Notify Provider  Until Discontinued      08/08/19 1458    08/08/19 1451  NPO Diet  Diet Effective Now,   Status:  Canceled     Comments:  Strict NPO Until Nursing Dysphagia Screen Passed    08/08/19 1458    08/08/19 1451  OT Consult: Eval & Treat Stroke Patient  Once      08/08/19 1458    08/08/19 1451  PT Consult: Eval & Treat Stroke Patient; As Tolerated  Once      08/08/19 1458    08/08/19 1451  SLP Consult: Eval & Treat Communication Disorder  Once     Comments:  Per stroke protocol.    08/08/19 1458    08/08/19 1451  Inpatient Case Management  Consult  Once     Provider:  (Not yet assigned)    08/08/19 1458    08/08/19 1451  Insert Peripheral IV  Once      08/08/19 1458    08/08/19 1451  [place]ine Lock & Maintain IV Access  Continuous      08/08/19 1458    08/08/19 1451  Safety Precautions  Continuous      08/08/19 1458    08/08/19 1451  Obtain Baseline Clinical [place] Withdrawal Assessment - Ar, Sedation Scale & Vital Signs  Once     Comments:  Follow CIWA  Scoring Reference Guide    08/08/19 1458    08/08/19 1451  Clinical [place] Withdrawal Assessment (CIWA-Ar)  Per Hospital Policy      08/08/19 1458    08/08/19 1451  If CIWA Score Less Than 8 Monitor Every 4 Hours & Then Discontinue Assessment - Restart Scoring Assessment & Protocol If Symptoms Reappear  Continuous      08/08/19 1458    08/08/19 1451  Obtain Pre & Post Sedation Scores With Every Lorazepam Dose - Hold For POSS Greater Than 2 or RASS of -3 or Less  Continuous      08/08/19 1458    08/08/19 1451  Seizure Precautions  Continuous      08/08/19 1458    08/08/19 1451  Inpatient Admission  Once      08/08/19 1458    08/08/19 1450  sodium chloride 0.9 % flush 3-10 mL  As Needed      08/08/19 1458    08/08/19 1444  Continuous pulse oximetry  Continuous,   Status:  Canceled      08/08/19 1443    08/08/19 1444  Place Sequential Compression Device  Once      08/08/19 1443    08/08/19 1444  Cardiac Monitoring  Continuous,   Status:  Canceled      08/08/19 1443    08/08/19 1434  Code Status and Medical Interventions:  Continuous,   Status:  Canceled      08/08/19 1437    Unscheduled  Order CT Head Without Contrast for Neurological Decline  As Needed      08/08/19 1458    Unscheduled  Up With Assistance  As Needed      08/08/19 1458    --  SCANNED - TELEMETRY        08/08/19 0000    --  citalopram (CeleXA) 20 MG tablet  Daily,   Status:  Discontinued      08/09/19 1222    --  amLODIPine (NORVASC) 5 MG tablet  Daily,   Status:  Discontinued      08/09/19 1222    --  losartan-hydrochlorothiazide (HYZAAR) 100-12.5 MG per tablet  Daily,   Status:  Discontinued      08/09/19 1222          Ventilator/Non-Invasive Ventilation Settings (From admission, onward)    None        Operative/Procedure Notes (all)     No notes of this type exist for this encounter.           Physician Progress Notes (all)      Donald Biggs, APRN at 8/12/2019  7:50 AM          Olman Vaca  49 y.o.      Chief complaint:   No  "complaints    Subjective:  Symptoms:  Stable.    Diet:  Adequate intake.    Activity level: Impaired due to weakness.    Pain:  He reports no pain.      No events    Temp:  [97.5 °F (36.4 °C)-98.5 °F (36.9 °C)] 97.5 °F (36.4 °C)  Heart Rate:  [64-84] 64  Resp:  [18] 18  BP: (108-150)/(53-86) 148/86      Objective:  General Appearance:  Comfortable, well-appearing, in no acute distress and not in pain.    Vital signs: (most recent): Blood pressure 148/86, pulse 64, temperature 97.5 °F (36.4 °C), temperature source Oral, resp. rate 18, height 172 cm (67.72\"), weight 105 kg (231 lb 7.7 oz), SpO2 95 %.  Vital signs are normal.  No fever.    Output: Producing urine.    HEENT: Normal HEENT exam.    Lungs:  Normal effort and normal respiratory rate.  He is not in respiratory distress.    Heart: Normal rate.  Regular rhythm.    Chest: Symmetric chest wall expansion.   Extremities: Normal range of motion.    Skin:  Warm and dry.    Abdomen: Abdomen is soft and non-distended.  Bowel sounds are normal.   There is no abdominal tenderness.     Pulses: Distal pulses are intact.        Neurologic Exam     Mental Status   Oriented to person, place, and time.   Attention: normal. Concentration: normal.   Speech: speech is normal   Level of consciousness: alert  Knowledge: good.   Normal comprehension.     Cranial Nerves   Cranial nerves II through XII intact.     Motor Exam   Muscle bulk: normalLeft hemiparesis     Sensory Exam   Light touch normal.         CVA (cerebral vascular accident) (CMS/HCC)    Left hemiparesis (CMS/HCC)    Benign essential HTN    Medical non-compliance    HLD (hyperlipidemia)    Alcohol use disorder, moderate, dependence (CMS/HCC)    Vocal cord polyp    Berry aneurysm      Lab Results (last 24 hours)     ** No results found for the last 24 hours. **              Plan:   CV: Heart rate and blood pressure stable  Respiratory: Oxygen level stable  GI: Tolerating p.o.   adequate urine output  Neuro: Exam " stable  Continue with physical and occupational therapy.  Discharge planning waiting on acute rehab.  Dr. Encinas is to talk to neurosurgeon at [facility] regarding aneurysm today for further recommendations and follow-up    DAREK Dumont      Electronically signed by Donald Biggs APRN at 8/12/2019  7:53 AM     Tay Collado, DO at 8/11/2019 11:04 AM              [facility]  INPATIENT PROGRESS NOTE    Length of Stay: 3  Date of Admission: 8/8/2019  Primary Care Physician: Gordon Robles MD    Subjective     Chief Complaint:     Left-sided weakness    HPI     The patient continues to work with physical therapy.  Placement at acute rehab is still pending.   has sent them information and informed their admissions coordinator and disposition is pending a bed offer.  Signs are stable and the patient is afebrile.  He is alert, oriented and talkative.    Review of Systems     All pertinent negatives and positives are as above. All other systems have been reviewed and are negative unless otherwise stated.     Objective    Temp:  [97.8 °F (36.6 °C)-98.4 °F (36.9 °C)] 98.2 °F (36.8 °C)  Heart Rate:  [56-71] 69  Resp:  [16-18] 18  BP: (116-150)/(66-97) 150/82    Lab Results (last 24 hours)     ** No results found for the last 24 hours. **          Imaging Results (last 24 hours)     ** No results found for the last 24 hours. **             Intake/Output Summary (Last 24 hours) at 8/11/2019 1104  Last data filed at 8/11/2019 0623  Gross per 24 hour   Intake --   Output 1550 ml   Net -1550 ml       Physical Exam    Constitutional: He is oriented to person, place, and time. He appears well-developed and well-nourished. He is cooperative. No distress.     HENT:   Head: Normocephalic and atraumatic.   Right Ear: External ear normal.   Left Ear: External ear normal.   Nose: Nose normal.   Mouth/Throat: Oropharynx is clear and  moist. Facial asymmetry with weakness of the left facial muscles continues to improve slowly.    Eyes: Conjunctivae are normal.  No scleral icterus.   Neck: Normal range of motion. Neck supple. No JVD present.   Cardiovascular: Normal rate, regular rhythm, normal heart sounds and intact distal pulses.   No murmur heard.  Pulmonary/Chest: Effort normal. No respiratory distress.    Abdominal: Soft. Bowel sounds are normal. He exhibits no distension and no mass. There is no tenderness.   Musculoskeletal: He exhibits no edema.  Decreased range of motion and muscle tone on the left.  Neurological: He is alert and oriented to person, place, and time. He exhibits abnormal muscle tone (on left). Coordination (on left) abnormal.   Skin: Skin is warm and dry. No rash noted.   Psychiatric: He has a normal mood and affect.         Results Review:  I have reviewed the labs, radiology results, and diagnostic studies since my last progress note and made treatment changes reflective of the results.   I have reviewed the current medications.    Assessment/Plan     Active Hospital Problems    Diagnosis   • **CVA (cerebral vascular accident) (CMS/HCC)   • Vocal cord polyp   • Berry aneurysm   • Left hemiparesis (CMS/HCC)   • Benign essential HTN   • Medical non-compliance   • HLD (hyperlipidemia)   • Alcohol use disorder, moderate, dependence (CMS/HCC)       PLAN:  Increase amlodipine to 10 mg p.o. Daily  Acute rehab placement is pending  Continue PT/OT    Tay Collado DO   08/11/19   11:04 AM      Electronically signed by Tay Collado DO at 8/11/2019 11:07 AM     Tay Collado DO at 8/10/2019 12:26 PM              [facility] Medicine Services  INPATIENT PROGRESS NOTE    Length of Stay: 2  Date of Admission: 8/8/2019  Primary Care Physician: Gordon Robles MD    Subjective     Chief Complaint:     Left-sided weakness    HPI     Patient's coordination is improved on the left  side.  He continues to work with physical therapy and Occupational Therapy.  I discussed the patient's left vocal cord polyp with Dr. Ojeda who reviewed the patient's CT scan.  He is convinced that this represents a benign lesion and recommends following him up on an outpatient basis.  Neurosurgery has evaluated the patient with regard to the Rios aneurysm and surgical intervention is not indicated.  Improved blood pressure control is recommended.  Bed offer is pending at [facility].  TSH, folate and vitamin B12 are within normal limits.    Review of Systems     All pertinent negatives and positives are as above. All other systems have been reviewed and are negative unless otherwise stated.     Objective    Temp:  [97.5 °F (36.4 °C)-98.5 °F (36.9 °C)] 98.3 °F (36.8 °C)  Heart Rate:  [52-65] 59  Resp:  [14-16] 16  BP: (143-169)/(82-97) 143/97    Lab Results (last 24 hours)     Procedure Component Value Units Date/Time    Vitamin B12 [[id number]]  (Normal) Collected:  08/09/19 0437    Specimen:  Blood Updated:  08/09/19 1854     Vitamin B-12 368 pg/mL     Folate [[id number]]  (Normal) Collected:  08/09/19 0437    Specimen:  Blood Updated:  08/09/19 1854     Folate 10.30 ng/mL     TSH [[id number]]  (Normal) Collected:  08/09/19 0437    Specimen:  Blood Updated:  08/09/19 1820     TSH 1.660 mIU/mL           Imaging Results (last 24 hours)     ** No results found for the last 24 hours. **             Intake/Output Summary (Last 24 hours) at 8/10/2019 1226  Last data filed at 8/10/2019 1157  Gross per 24 hour   Intake 60 ml   Output 2900 ml   Net -2840 ml       Physical Exam    Constitutional: He is oriented to person, place, and time. He appears well-developed and well-nourished. He is cooperative. No distress.   There is improved stereognosis on the left.  HENT:   Head: Normocephalic and atraumatic.   Right Ear: External ear normal.   Left Ear: External ear normal.   Nose: Nose normal.   Mouth/Throat: Oropharynx is  clear and moist. Facial asymmetry is mild today.yes: Conjunctivae are normal.  No scleral icterus.   Neck: Normal range of motion. Neck supple. No JVD present.   Cardiovascular: Normal rate, regular rhythm, normal heart sounds and intact distal pulses.   No murmur heard.  Pulmonary/Chest: Effort normal. No respiratory distress.    Abdominal: Soft. Bowel sounds are normal. He exhibits no distension and no mass. There is no tenderness.   Musculoskeletal: He exhibits no edema, tenderness or deformity.   Neurological: He is alert and oriented to person, place, and time. He exhibits abnormal muscle tone (on left). Coordination (on left) abnormal.   Skin: Skin is warm and dry. No rash noted.   Psychiatric: He has a normal mood and affect.         Results Review:  I have reviewed the labs, radiology results, and diagnostic studies since my last progress note and made treatment changes reflective of the results.   I have reviewed the current medications.    Assessment/Plan     Active Hospital Problems    Diagnosis   • **CVA (cerebral vascular accident) (CMS/HCC)   • Vocal cord polyp   • Berry aneurysm   • Left hemiparesis (CMS/HCC)   • Benign essential HTN   • Medical non-compliance   • HLD (hyperlipidemia)   • Alcohol use disorder, moderate, dependence (CMS/HCC)       PLAN:  Add terazosin 5 mg p.o. daily to the antihypertensive regimen.  Continue PT/OT  Bed offer from [facility] rehab is pending  Medicaid is pending    Tay Collado DO   08/10/19   12:26 PM      Electronically signed by Tay Collado DO at 8/10/2019 12:30 PM     Tay Collado DO at 8/9/2019  1:40 PM              [facility] Medicine Services  INPATIENT PROGRESS NOTE    Length of Stay: 1  Date of Admission: 8/8/2019  Primary Care Physician: Gordon Robles MD    Subjective     Chief Complaint:     Dizzy, facial numbness, left arm and leg weakness    HPI     There has been little change in the patient's  left-sided deficit.  MRI scan of the brain shows an extensive infarct in the right PCA territory.  CT angiogram of the neck shows a hemodynamically insignificant stenosis of both ICAs at 20%.  There was a polypoid lesion associated with the left vocal fold.  ENT will be consulted to evaluate the lesion further.  CT angiogram of the head shows attenuation suggesting steno-occlusive disease of the P2 segment of the right posterior cerebral artery.  A 6 mm laterally projecting.  Aneurysm at the right MCA trifurcation it was noted.  I will ask neurosurgery to further evaluate that abnormality and make recommendations.  PT and OT have evaluated the patient and have recommended acute, inpatient rehabilitation.  Acute rehab referral will be made.    Typically hypertension is permitted post CVA, but given the presence of a right MCA berry aneurysm, antihypertensive medication will be initiated today.    Review of Systems     All pertinent negatives and positives are as above. All other systems have been reviewed and are negative unless otherwise stated.     Objective    Temp:  [97.5 °F (36.4 °C)-98.3 °F (36.8 °C)] 98.3 °F (36.8 °C)  Heart Rate:  [53-61] 61  Resp:  [16-18] 18  BP: (166-187)/() 187/107    Lab Results (last 24 hours)     Procedure Component Value Units Date/Time    POC Glucose Once [[id number]]  (Normal) Collected:  08/09/19 1200    Specimen:  Blood Updated:  08/09/19 1212     Glucose 95 mg/dL      Comment: : [id number] [employer] ID: [id number]       POC Glucose Once [[id number]]  (Normal) Collected:  08/09/19 0824    Specimen:  Blood Updated:  08/09/19 0840     Glucose 101 mg/dL      Comment: : [id number] [employer] ID: [id number]       Hemoglobin A1c [[id number]]  (Normal) Collected:  08/09/19 0437    Specimen:  Blood Updated:  08/09/19 0611     Hemoglobin A1C 5.7 %     Narrative:       Less than 6.0           Non-Diabetic Range  6.0-7.0                 ADA Therapeutic  Target  Greater than 7.0        Action Suggested    Lipid Panel [[id number]]  (Abnormal) Collected:  08/09/19 0437    Specimen:  Blood Updated:  08/09/19 0554     Total Cholesterol 207 mg/dL      Triglycerides 161 mg/dL      HDL Cholesterol 38 mg/dL      LDL Cholesterol  133 mg/dL      LDL/HDL Ratio 3.60          Imaging Results (last 24 hours)     Procedure Component Value Units Date/Time    CT Angiogram Head With & Without Contrast [[id number]] Collected:  08/08/19 1927     Updated:  08/08/19 1941    Narrative:       EXAMINATION: CT angiogram of the head with contrast dated 08/20/2019     DOSE: 504 mGycm. Automated exposure control was utilized to diminish  patient radiation dose..     HISTORY: Stroke symptoms     FINDINGS: Multiple contiguous axial images are obtained through the  [place] 1 mm intervals findings contrast ministration with  reformatted images obtained in the sagittal and coronal projections from  the original data set.     The right vertebral artery is dominant. There is mild plaquing with  associated stenosis of the proximal intracranial aspect of the right  vertebral artery. Both distal vertebral arteries are otherwise widely  patent to the level the basilar artery.     The basilar artery is normal in caliber. Both superior cerebellar  arteries as well as the left posterior cerebral artery are normal in  caliber and appearance. There is some attenuation within the P2 segment  of the right posterior cerebral artery suggesting some underlying  steno-occlusive disease. This is in the same segment as the patient's  acute infarct.     The distal internal carotid arteries are widely patent. There is mild  calcific plaquing involving the cavernous and supraclinoid aspect of  both ICAs without evidence of rate limiting stenosis. There is a  hypoplastic A1 segment of the right anterior cerebral artery. A 6 mm  aneurysm is noted at the right MCA trifurcation. The anterior and middle  cerebral  arteries are otherwise widely patent. No additional aneurysms  are identified.       Impression:       1.. Attenuation suggesting steno-occlusive disease of the P2 segment of  the right posterior cerebral artery. There is mild calcific plaquing  involving the proximal intracranial aspect of the right vertebral  artery. The posterior circulation is otherwise unremarkable.  2. 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 There is a hypoplastic A1 segment of the right anterior  cerebral artery. The anterior circulation is otherwise unremarkable.  3. The dural venous sinuses are unremarkable with no evidence of dural  venous sinus thrombosis.  This report was finalized on 08/08/2019 19:38 by Dr. Amado Escalona MD.    CT Angiogram Neck With & Without Contrast [[id number]] Collected:  08/08/19 1810     Updated:  08/08/19 1826    Narrative:       EXAMINATION: CT angiogram of the neck with contrast 08/08/2019     DOSE: 504 mGycm. Automated exposure control was utilized to diminish  patient radiation dose..     HISTORY: Stroke.     FINDINGS: Multiple contiguous axial and obtained from the aortic arch  through the skull base at 1 mm intervals following intravenous contrast  administration with reformatted images obtained in the sagittal and  coronal projections from the original dataset as well as MIPS.     The study is degraded by motion. There is a calcified granuloma within  the right upper lobe. Lung apices are otherwise clear. The superior  mediastinum is unremarkable. There is mild tortuosity of the proximal  great vessels.     The thyroid gland is homogeneous in density without evidence of focal  nodule or mass. No evidence of adenopathy in the supraclavicular fossa.  The level of the true and false cords is remarkable for asymmetric  nodule which appears to be associated with the left vocal fold. This  measures approximately 5 mm in size and direct visualization is  recommended. There are  some mildly prominent left posterior triangle  nodes including an 8 mm short axis node. Smaller nodes are noted within  the jugular chain bilaterally. The epiglottis and aryepiglottic folds  are unremarkable. The nasopharynx and parapharyngeal spaces are  unremarkable with no evidence of mass or mass effect.     There is some mild plaquing involving the innominate artery on the  right. The origin of the great vessels are otherwise unremarkable  including the vertebral arteries although again partially obscured  secondary to motion artifact. The right vertebral artery is dominant.     Both vertebral arteries are widely patent in their extracranial aspect.  There is calcific plaquing with mild associated stenosis of the proximal  intracranial segment of the right vertebral artery.     The right common carotid artery is widely patent to the level of the  carotid bifurcation. There is mild calcific plaquing involving the  carotid bulb and proximal aspect of the ICA. The more distal right ICA  is tortuous. There is very slight narrowing of the proximal segment of  the right ICA but with a less than 20% cross-sectional narrowing. The  more distal right ICA is widely patent.     The left common carotid artery is widely patent. There is mild calcific  plaquing involving the carotid bulb and proximal aspect of the ICA again  resulting in only a very mild stenosis with a less than 20%  cross-sectional narrowing of the lumen of the vessel. The more distal  left ICA is tortuous but otherwise widely patent.       Impression:       1. There is mild calcific plaquing and noncalcific plaque involving the  carotid bulb and proximal aspect of both internal carotid arteries. This  is not resulting in a hemodynamically significant stenosis with a less  than 20% cross-sectional narrowing noted of both ICAs. The more distal  ICAs are tortuous but otherwise widely patent.  2. Minimal calcific plaquing involving the proximal right  innominate.  The origin of the great vessels are otherwise unremarkable.  3. Right vertebral artery is dominant. Both vertebral arteries are  widely patent in their extracranial aspect. There is some mild calcific  plaquing with mild associated stenosis of the proximal intracranial  aspect of the right vertebral artery.  4. There is a polypoid lesion which appears to be associated with the  left vocal fold. This would warrant follow-up with direct visualization  following ENT consultation.  This report was finalized on 08/08/2019 18:23 by Dr. Amado Escalona MD.    MRI Brain Without Contrast [[id number]] Collected:  08/08/19 1656     Updated:  08/08/19 1700    Narrative:       EXAM: MR BRAIN WITHOUT IV CONTRAST 08/08/2019     COMPARISON: None      HISTORY: 49 years-old Male. Stroke      TECHNIQUE:   Routine pulse sequences of the brain were obtained without IV contrast.      REPORT:     There is diffusion restriction within the right thalamus and medial to  the lobe, with associated T2/flair abnormal signal, consistent with  acute right PCA infarct. There is no hemorrhagic conversion.     No acute hydrocephalus. No midline shift. No suspicious extra fluid  collection. Mild chronic microvascular changes. Mild-to-moderate  cerebral volume loss.     Mastoid air cells are clear. The paranasal sinuses are free of  obstructive mucosal disease. The intraorbital structures are  unremarkable. The basal cisterns are preserved.                   Impression:       1.  Acute extensive infarct in the right PCA territory.  This report was finalized on 08/08/2019 16:57 by Dr. Gwen Conley MD.             Intake/Output Summary (Last 24 hours) at 8/9/2019 1340  Last data filed at 8/9/2019 0900  Gross per 24 hour   Intake 200 ml   Output 1585 ml   Net -1385 ml       Physical Exam    Constitutional: He is oriented to person, place, and time. He appears well-developed and well-nourished. He is cooperative. No distress.    There is  improved recognition of his left side today.  HENT:   Head: Normocephalic and atraumatic.   Right Ear: External ear normal.   Left Ear: External ear normal.   Nose: Nose normal.   Mouth/Throat: Oropharynx is clear and moist. Facial asymmetry with weakness of the left facial muscles is improved today.    Eyes: Conjunctivae are normal.  No scleral icterus.   Neck: Normal range of motion. Neck supple. No JVD present.   Cardiovascular: Normal rate, regular rhythm, normal heart sounds and intact distal pulses.   No murmur heard.  Pulmonary/Chest: Effort normal. No respiratory distress.    Abdominal: Soft. Bowel sounds are normal. He exhibits no distension and no mass. There is no tenderness.   Musculoskeletal: He exhibits no edema, tenderness or deformity.   Decreased range of motion on left side with no significant change compared to evaluation yesterday.   Neurological: He is alert and oriented to person, place, and time. He exhibits abnormal muscle tone (on left). Coordination (on left) abnormal. Pronator drift on left persists.   Skin: Skin is warm and dry. No rash noted.   Psychiatric: He has a normal mood and affect. His behavior is normal. Judgment and thought content normal.          Results Review:  I have reviewed the labs, radiology results, and diagnostic studies since my last progress note and made treatment changes reflective of the results.   I have reviewed the current medications.    Assessment/Plan     Active Hospital Problems    Diagnosis   • **CVA (cerebral vascular accident) (CMS/HCC)   • Vocal cord polyp   • Berry aneurysm   • Left hemiparesis (CMS/HCC)   • Benign essential HTN   • Medical non-compliance   • HLD (hyperlipidemia)   • Alcohol use disorder, moderate, dependence (CMS/HCC)       PLAN:  Amlodipine 5 mg p.o. daily, first dose now   has been requested to make an acute rehab referral  Continue PT/OT  Consult ENT regarding vocal cord polyp  Consult neurosurgery regarding 6 mm  right MCA [place] aneurysm    Tay Collado DO   08/09/19   1:40 PM      Electronically signed by Tay Collado DO at 8/9/2019  1:52 PM          Consult Notes (all)      Esequiel Encinas MD at 8/9/2019  6:46 PM      Consult Orders    1. Inpatient Neurosurgery Consult [[id number]] ordered by Tay Collado DO at 08/09/19 1302                Reason for consult intracranial aneurysm    No chief complaint on file.        HPI: This 49-year-old gentleman who was at home On August 8, 2019 when he felt dizzy and started to develop weakness and numbness in the left side.  Is admitted through the emergency room to the stroke service and found to have a right PCA CVA.  Subsequent work-up including CT angiogram of the head and neck revealed a right MCA aneurysm.  There is no sign of hemorrhage or rupture.  Currently the patient complains of no headache.  He says his left upper and lower extremity is still weak and numb.  He denies any visual disturbances.    Review of Systems   Musculoskeletal: Positive for gait problem.   Neurological: Positive for weakness and numbness.   All other systems reviewed and are negative.       Past Medical History:  has a past medical history of Benign essential HTN (8/8/2019) and HLD (hyperlipidemia) (8/8/2019).    Past Surgical History:  has a past surgical history that includes Femoral Lymph Node Biopsy/Excision and Mouth surgery.    Family History: family history includes Cancer in his mother; Hypertension in his mother; No Known Problems in his father; Stroke in his mother.    Social History:  reports that he has been smoking cigarettes.  He has a 42.00 pack-year smoking history. He has never used smokeless tobacco. He reports that he drinks about 42.0 oz of alcohol per week. He reports that he uses drugs. Drug: Marijuana. Frequency: 7.00 times per week.    Allergies: Patient has no known allergies.    Medications: Scheduled Meds:  amLODIPine 5 mg Oral Q24H   aspirin 81 mg  "Oral Daily   Or      aspirin 300 mg Rectal Daily   atorvastatin 80 mg Oral Nightly   nicotine 1 patch Transdermal Q24H   sodium chloride 3 mL Intravenous Q12H   thiamine (VITAMIN B1) IVPB 100 mg Intravenous Daily     Continuous Infusions:   PRN Meds:.•  acetaminophen  •  aluminum-magnesium hydroxide-simethicone  •  bisacodyl  •  labetalol  •  LORazepam **OR** LORazepam **OR** LORazepam **OR** LORazepam **OR** LORazepam **OR** LORazepam  •  melatonin  •  ondansetron  •  sodium chloride  •  traMADol     Objective:  Vital signs: (most recent): Blood pressure 169/97, pulse 60, temperature 98.3 °F (36.8 °C), temperature source Oral, resp. rate 14, height 172 cm (67.72\"), weight 105 kg (231 lb 7.7 oz), SpO2 97 %.    Lungs:  Normal effort.  He is not in respiratory distress.    Heart: Normal rate.  Regular rhythm.    Abdomen: Abdomen is soft and non-distended.        Neurologic Exam     Mental Status   Oriented to person, place, and time.   Attention: normal.   Speech: speech is normal   Level of consciousness: alert  Knowledge: good.     Cranial Nerves   Cranial nerves II through XII intact.     Motor Exam   Muscle bulk: normal  Overall muscle tone: normal  Right arm pronator drift: absent  Left arm pronator drift: present    Strength   Right deltoid: 5/5  Left deltoid: 3/5  Right biceps: 5/5  Left biceps: 3/5  Right triceps: 5/5  Left triceps: 3/5  Right interossei: 5/5  Left interossei: 4/5  Right iliopsoas: 5/5  Left iliopsoas: 4/5  Right quadriceps: 5/5  Left quadriceps: 4/5  Right anterior tibial: 5/5  Left anterior tibial: 4/5  Right posterior tibial: 5/5  Left posterior tibial: 4/5    Sensory Exam   Right arm light touch: normal  Left arm light touch: decreased from elbow  Right leg light touch: normal  Left leg light touch: decreased from knee  Right arm pinprick: normal  Left arm pinprick: decreased from elbow  Right leg pinprick: normal  Left leg pinprick: decreased from knee    Gait, Coordination, and Reflexes "     Gait  Gait: (Unsteady gait requires assistance)    Coordination   Romberg: positive  Finger to nose coordination: abnormal  Heel to shin coordination: abnormal  Tandem walking coordination: abnormal    Tremor   Resting tremor: absent  Intention tremor: absent  Action tremor: absent    Reflexes   Reflexes 2+ except as noted. Left upper and lower extremity ataxia       Vital Signs  Temp:  [97.5 °F (36.4 °C)-98.3 °F (36.8 °C)] 98.3 °F (36.8 °C)  Heart Rate:  [53-61] 60  Resp:  [14-18] 14  BP: (166-187)/() 169/97    Physical Exam   Constitutional: He is oriented to person, place, and time. He appears well-developed and well-nourished.   Cardiovascular: Normal rate, regular rhythm and normal heart sounds.   Pulmonary/Chest: Effort normal and breath sounds normal. No respiratory distress.   Abdominal: Soft. He exhibits no distension. There is no tenderness.   Neurological: He is oriented to person, place, and time. He has an abnormal Finger-Nose-Finger Test, an abnormal Heel to Aguilar Test, an abnormal Romberg Test and an abnormal Tandem Gait Test.   Psychiatric: His speech is normal.       Results Review:   I reviewed the patient's new clinical results.  I reviewed the patient's new imaging results and agree with the interpretation.  I reviewed the patient's other test results and agree with the interpretation          Lab Results (last 24 hours)     Procedure Component Value Units Date/Time    TSH [[id number]]  (Normal) Collected:  08/09/19 0437    Specimen:  Blood Updated:  08/09/19 1820     TSH 1.660 mIU/mL     Vitamin B12 [[id number]] Collected:  08/09/19 0437    Specimen:  Blood Updated:  08/09/19 1740    Folate [[id number]] Collected:  08/09/19 0437    Specimen:  Blood Updated:  08/09/19 1740    POC Glucose Once [[id number]]  (Normal) Collected:  08/09/19 1200    Specimen:  Blood Updated:  08/09/19 1212     Glucose 95 mg/dL      Comment: : [id number] [facility] ID: [id number]       POC Glucose Once  [[id number]]  (Normal) Collected:  08/09/19 0824    Specimen:  Blood Updated:  08/09/19 0840     Glucose 101 mg/dL      Comment: : [id number] Omar Lieberman ID: [id number]       Hemoglobin A1c [[id number]]  (Normal) Collected:  08/09/19 0437    Specimen:  Blood Updated:  08/09/19 0611     Hemoglobin A1C 5.7 %     Narrative:       Less than 6.0           Non-Diabetic Range  6.0-7.0                 ADA Therapeutic Target  Greater than 7.0        Action Suggested    Lipid Panel [[id number]]  (Abnormal) Collected:  08/09/19 0437    Specimen:  Blood Updated:  08/09/19 0554     Total Cholesterol 207 mg/dL      Triglycerides 161 mg/dL      HDL Cholesterol 38 mg/dL      LDL Cholesterol  133 mg/dL      LDL/HDL Ratio 3.60          CVA (cerebral vascular accident) (CMS/HCC)    Left hemiparesis (CMS/HCC)    Benign essential HTN    Medical non-compliance    HLD (hyperlipidemia)    Alcohol use disorder, moderate, dependence (CMS/HCC)    Vocal cord polyp    Berry aneurysm      Assessment/Plan:   Imaging demonstrates a 6 mm x 5 mm right MCA aneurysm.  This is unruptured.  This is an incidental finding.  ISU IA standards dictate aneurysms under 7 mm the risk of treatment exceeds the risk of conservative care.  I am to discuss this case and showed the pictures to our endovascular colleagues at the [facility] to see if they would recommend treating this aneurysm in the context of his ischemic right PCA CVA.  Otherwise I recommend maintaining blood pressures less than 160 systolic and I encouraged smoking cessation.  I discussed this at length with the patient.  He acknowledged understand this.  His questions and concerns were addressed.    I discussed the patients findings and my recommendations with patient    Esequiel Encinas MD  08/09/19  6:46 PM    Time: More than 50% of time spent in counseling and coordination of care:  Total face-to-face/floor time 60 min.  Time spent in counseling 30 min. Counseling  included the following topics: Diagnosis, condition, treatment, and plan        Electronically signed by Esequiel Encinas MD at 8/9/2019  6:55 PM     Rima Gomes MD at 8/9/2019 12:00 PM      Consult Orders    1. Inpatient Neurology Consult Stroke [[id number]] ordered by Tay Collado DO at 08/08/19 1458                    Neurology Consult Note    Patient:  Olman Vaca   YOB: 1970  MRN:  [id number]  Date of Admission:  8/8/2019  2:24 PM    Date: 8/9/2019    Referring Provider: Tay Collado DO     Reason for Consultation: CVA w R hemiparesis      History of present illness:     This is a 49 y.o. right handed male.with H/O hyperlipidemia, hypertension, smoker, evaluated for a stroke    Patient was sent from [facility] for onset of left sided weakness, and dizziness as well as headache    Patient reports that the day before yesterday while lying on the couch he had an abrupt onset of posterior headaches that radiated forward to his vertex.  This was associated with the dizziness which he described more as a lightheadedness and a feeling of near syncope.  He also noted incoordination he now that he his left arm was not working right because he could not reach for the glass.  He noted numbness in the front of his face and numbness in the left arm leg and trunk.  When he got up to walk he was incoordinated but could walk.  He ran into the wall on the left he did end up going back to bed and sleeping he got up yesterday morning at 6 in a.m. and noted that the symptoms were still there came into [facility].  The patient denies any vision loss.    Of note he is not taking any lipid-lowering agents for years.  He states that there is a discontinued medical card so he has not been able to get any of his medications filled for the past few months so has not been on any of his antihypertensives    He states he has had vision problems meaning that he needs  glasses for a long time.  Today he states he still has a gait and balance that his left leg is not working right his left arm is weak and he continues to have numbness in the front of the face and the left side of his body.    Past Medical History:   Diagnosis Date   • Benign essential HTN 8/8/2019   • HLD (hyperlipidemia) 8/8/2019       Past Surgical History:   Procedure Laterality Date   • FEMORAL LYMPH NODE BIOPSY/EXCISON     • MOUTH SURGERY         Prior to Admission medications    Not on File--NONE       Hospital scheduled medications:     aspirin 81 mg Oral Daily   Or      aspirin 300 mg Rectal Daily   atorvastatin 80 mg Oral Nightly   nicotine 1 patch Transdermal Q24H   sodium chloride 3 mL Intravenous Q12H   thiamine (VITAMIN B1) IVPB 100 mg Intravenous Daily     Hospital PRN medications:  •  acetaminophen  •  aluminum-magnesium hydroxide-simethicone  •  bisacodyl  •  labetalol  •  LORazepam **OR** LORazepam **OR** LORazepam **OR** LORazepam **OR** LORazepam **OR** LORazepam  •  melatonin  •  ondansetron  •  sodium chloride  •  traMADol    No Known Allergies    Social History     Socioeconomic History   • Marital status: Single     Spouse name: Not on file   • Number of children: Not on file   • Years of education: Not on file   • Highest education level: Not on file   Tobacco Use   • Smoking status: Current Every Day Smoker     Packs/day: 1.00     Years: 42.00     Pack years: 42.00     Types: Cigarettes   • Smokeless tobacco: Never Used   Substance and Sexual Activity   • Alcohol use: Yes     Alcohol/week: 42.0 oz     Types: 70 Shots of liquor per week   • Drug use: Yes     Frequency: 7.0 times per week     Types: Marijuana   • Sexual activity: Not Currently     Family History   Problem Relation Age of Onset   • Hypertension Mother    • Cancer Mother    • Stroke Mother    • No Known Problems Father        Review of Systems  A 14 point review of systems was reviewed and was negative except for continued  imbalance and left sided numbness and weakness.     Vital Signs   Temp:  [97.5 °F (36.4 °C)-98.3 °F (36.8 °C)] 98.3 °F (36.8 °C)  Heart Rate:  [53-61] 61  Resp:  [16-18] 18  BP: (166-187)/() 187/107    General Exam:  Head:  Normal cephalic, atraumatic  HEENT:  Neck supple  Fundoscopic Exam:  No signs of disc edema  CVS:  Regular rate and rhythm.  No murmurs  Carotid Examination:  No bruits  Lungs:  Clear to auscultation  Abdomen:  Non-tender, Non-distended  Extremities:  No signs of peripheral edema  Skin:  No rashes    Neurologic Exam:    Mental Status:    -Awake, Alert, Oriented X 3  -No word finding difficulties  -No aphasia  -No dysarthria  -Follows simple and complex commands    CN II:  Visual fields left homonymous hemianopia  Pupils equally reactive to light  CN III, IV, VI:  Extraocular Muscles full with no signs of nystagmus  CN V:  Facial sensory is asymmetric --decreased on the left  CN VII:  Facial motor symmetric  CN VIII:  Gross hearing intact bilaterally  CN IX/X:  Palate elevates symmetrically  CN XI:  Shoulder shrug symmetric  CN XII:  Tongue is midline on protrusion    Motor: (strength out of 5:  1= minimal movement, 2 = movement in plane of gravity, 3 = movement against gravity, 4 = movement against some resistance, 5 = full strength)    -Right Upper Ext: Proximal: 5 Distal: 5  -Left Upper Ext: arm drift and weakness of left shoulder abduction and unable to sustain strength but nearly at 5/5    -Right Lower Ext: Proximal: 5 Distal: 5  -Left Lower Ext: Leg drift Proximal: 5- Distal: 5 but unable to sustain it    DTR:  -Right   Bicep: 2+ Triceps: 2+ Brachioradialis: 2+   Patella: 2+ Ankle: 2+ Neg Babinski  -Left   Bicep: 2+ Triceps: 2+ Brachioradialis: 2+   Patella: 2+ Ankle: 2+ Neg Babinski    Sensory:  -Intact to light touch, pinprick, temperature, pain, and proprioception    Coordination:  -Finger to nose intact except past point on the left  -Heel to shin intact on the right and ataxic on  the left  -No ataxia    Gait  -Not tested for safety reasons      Results Review:  Lab Results (last 7 days)     Procedure Component Value Units Date/Time    POC Glucose Once [[id number]]  (Normal) Collected:  08/09/19 1200    Specimen:  Blood Updated:  08/09/19 1212     Glucose 95 mg/dL      Comment: : [id number] [employer] Electronic[employer] ID: [id number]       POC Glucose Once [[id number]]  (Normal) Collected:  08/09/19 0824    Specimen:  Blood Updated:  08/09/19 0840     Glucose 101 mg/dL      Comment: : [id number] Omar LindsayMeter ID: [id number]       Hemoglobin A1c [[id number]]  (Normal) Collected:  08/09/19 0437    Specimen:  Blood Updated:  08/09/19 0611     Hemoglobin A1C 5.7 %     Narrative:       Less than 6.0           Non-Diabetic Range  6.0-7.0                 ADA Therapeutic Target  Greater than 7.0        Action Suggested    Lipid Panel [[id number]]  (Abnormal) Collected:  08/09/19 0437    Specimen:  Blood Updated:  08/09/19 0554     Total Cholesterol 207 mg/dL      Triglycerides 161 mg/dL      HDL Cholesterol 38 mg/dL      LDL Cholesterol  133 mg/dL      LDL/HDL Ratio 3.60            Results for orders placed during the hospital encounter of 08/08/19   Adult Transthoracic Echo Complete W/ Cont if Necessary Per Protocol (With Agitated Saline)    Narrative · Left ventricular wall thickness is consistent with moderate concentric   hypertrophy.  · Estimated EF = 55%.  · Left ventricular diastolic dysfunction.  · Left atrial cavity size is mild-to-moderately dilated.  · No evidence of a patent foramen ovale. Saline test results are negative.  · No evidence of pulmonary hypertension is present.          .  Imaging Results (last 24 hours)     Procedure Component Value Units Date/Time    CT Angiogram Head With & Without Contrast [[id number]] Collected:  08/08/19 1927     Updated:  08/08/19 1941    Narrative:       EXAMINATION: CT angiogram of the head with contrast dated 08/20/2019     DOSE: 504 mGycm.  Automated exposure control was utilized to diminish  patient radiation dose..     HISTORY: Stroke symptoms     FINDINGS: Multiple contiguous axial images are obtained through the  [place] 1 mm intervals findings contrast ministration with  reformatted images obtained in the sagittal and coronal projections from  the original data set.     The right vertebral artery is dominant. There is mild plaquing with  associated stenosis of the proximal intracranial aspect of the right  vertebral artery. Both distal vertebral arteries are otherwise widely  patent to the level the basilar artery.     The basilar artery is normal in caliber. Both superior cerebellar  arteries as well as the left posterior cerebral artery are normal in  caliber and appearance. There is some attenuation within the P2 segment  of the right posterior cerebral artery suggesting some underlying  steno-occlusive disease. This is in the same segment as the patient's  acute infarct.     The distal internal carotid arteries are widely patent. There is mild  calcific plaquing involving the cavernous and supraclinoid aspect of  both ICAs without evidence of rate limiting stenosis. There is a  hypoplastic A1 segment of the right anterior cerebral artery. A 6 mm  aneurysm is noted at the right MCA trifurcation. The anterior and middle  cerebral arteries are otherwise widely patent. No additional aneurysms  are identified.       Impression:       1.. Attenuation suggesting steno-occlusive disease of the P2 segment of  the right posterior cerebral artery. There is mild calcific plaquing  involving the proximal intracranial aspect of the right vertebral  artery. The posterior circulation is otherwise unremarkable.  2. 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 There is a hypoplastic A1 segment of the right anterior  cerebral artery. The anterior circulation is otherwise unremarkable.  3. The dural venous sinuses are  unremarkable with no evidence of dural  venous sinus thrombosis.  This report was finalized on 08/08/2019 19:38 by Dr. Amado Escalona MD.    CT Angiogram Neck With & Without Contrast [[id number]] Collected:  08/08/19 1810     Updated:  08/08/19 1826    Narrative:       EXAMINATION: CT angiogram of the neck with contrast 08/08/2019     DOSE: 504 mGycm. Automated exposure control was utilized to diminish  patient radiation dose..     HISTORY: Stroke.     FINDINGS: Multiple contiguous axial and obtained from the aortic arch  through the skull base at 1 mm intervals following intravenous contrast  administration with reformatted images obtained in the sagittal and  coronal projections from the original dataset as well as MIPS.     The study is degraded by motion. There is a calcified granuloma within  the right upper lobe. Lung apices are otherwise clear. The superior  mediastinum is unremarkable. There is mild tortuosity of the proximal  great vessels.     The thyroid gland is homogeneous in density without evidence of focal  nodule or mass. No evidence of adenopathy in the supraclavicular fossa.  The level of the true and false cords is remarkable for asymmetric  nodule which appears to be associated with the left vocal fold. This  measures approximately 5 mm in size and direct visualization is  recommended. There are some mildly prominent left posterior triangle  nodes including an 8 mm short axis node. Smaller nodes are noted within  the jugular chain bilaterally. The epiglottis and aryepiglottic folds  are unremarkable. The nasopharynx and parapharyngeal spaces are  unremarkable with no evidence of mass or mass effect.     There is some mild plaquing involving the innominate artery on the  right. The origin of the great vessels are otherwise unremarkable  including the vertebral arteries although again partially obscured  secondary to motion artifact. The right vertebral artery is dominant.     Both vertebral  arteries are widely patent in their extracranial aspect.  There is calcific plaquing with mild associated stenosis of the proximal  intracranial segment of the right vertebral artery.     The right common carotid artery is widely patent to the level of the  carotid bifurcation. There is mild calcific plaquing involving the  carotid bulb and proximal aspect of the ICA. The more distal right ICA  is tortuous. There is very slight narrowing of the proximal segment of  the right ICA but with a less than 20% cross-sectional narrowing. The  more distal right ICA is widely patent.     The left common carotid artery is widely patent. There is mild calcific  plaquing involving the carotid bulb and proximal aspect of the ICA again  resulting in only a very mild stenosis with a less than 20%  cross-sectional narrowing of the lumen of the vessel. The more distal  left ICA is tortuous but otherwise widely patent.       Impression:       1. There is mild calcific plaquing and noncalcific plaque involving the  carotid bulb and proximal aspect of both internal carotid arteries. This  is not resulting in a hemodynamically significant stenosis with a less  than 20% cross-sectional narrowing noted of both ICAs. The more distal  ICAs are tortuous but otherwise widely patent.  2. Minimal calcific plaquing involving the proximal right innominate.  The origin of the great vessels are otherwise unremarkable.  3. Right vertebral artery is dominant. Both vertebral arteries are  widely patent in their extracranial aspect. There is some mild calcific  plaquing with mild associated stenosis of the proximal intracranial  aspect of the right vertebral artery.  4. There is a polypoid lesion which appears to be associated with the  left vocal fold. This would warrant follow-up with direct visualization  following ENT consultation.  This report was finalized on 08/08/2019 18:23 by Dr. Amado Escalona MD.    MRI Brain Without Contrast [[id number]]  Collected:  08/08/19 1656     Updated:  08/08/19 1700    Narrative:       EXAM: MR BRAIN WITHOUT IV CONTRAST 08/08/2019     COMPARISON: None      HISTORY: 49 years-old Male. Stroke      TECHNIQUE:   Routine pulse sequences of the brain were obtained without IV contrast.      REPORT:     There is diffusion restriction within the right thalamus and medial to  the lobe, with associated T2/flair abnormal signal, consistent with  acute right PCA infarct. There is no hemorrhagic conversion.     No acute hydrocephalus. No midline shift. No suspicious extra fluid  collection. Mild chronic microvascular changes. Mild-to-moderate  cerebral volume loss.     Mastoid air cells are clear. The paranasal sinuses are free of  obstructive mucosal disease. The intraorbital structures are  unremarkable. The basal cisterns are preserved.                   Impression:       1.  Acute extensive infarct in the right PCA territory.  This report was finalized on 08/08/2019 16:57 by Dr. Gwen Conley MD.        MRI brain  DWI: Acute right PCA stroke      Telemetry: Sinus/sinus jacquelyn Last night: 52 to 67 Today 55 to 62    Impression  1. Acute right PCA stroke involving  right thalamus.and medial occipital with left homonymous hemianopsia with occlusion of the right PCA--P2 segment.   2. Polypoid lesion  left vocal cord  3. Hyperlipidemia with LDL of 133 and goal of < 70.   4. No hemodynamically significant intracranial nor vascular stenosis of neck vessels  5. No evidence of DM based on hemoglobin A1c of 5.7  6. Right MCA trifurcation aneurysm  7. Cognitive impairment    Plan  1. CT angio ordered stat (tried to se patient yesterday and discussed his situation with Dr Collado. Did review CTA and MRI yesterday and reviewed them again today with the patient.   2. ENT evaluation of left vocal cord polypoid lesion  3. Inpatient rehab.   4. Neurosurgery consultation in regard to aneurysm   5. B12, folate, TSH.   6.  thiamine    I discussed the  patients findings and my recommendations with patient, nursing staff and Dr. Tobias JENKINS. Taylor Lord MD  08/09/19  12:42 PM      Electronically signed by Rima Gomes MD at 8/9/2019  5:34 PM

## 2019-08-12 NOTE — PROGRESS NOTES
"Olman Vaca  49 y.o.      Chief complaint:   No complaints    Subjective:  Symptoms:  Stable.    Diet:  Adequate intake.    Activity level: Impaired due to weakness.    Pain:  He reports no pain.      No events    Temp:  [97.5 °F (36.4 °C)-98.5 °F (36.9 °C)] 97.5 °F (36.4 °C)  Heart Rate:  [64-84] 64  Resp:  [18] 18  BP: (108-150)/(53-86) 148/86      Objective:  General Appearance:  Comfortable, well-appearing, in no acute distress and not in pain.    Vital signs: (most recent): Blood pressure 148/86, pulse 64, temperature 97.5 °F (36.4 °C), temperature source Oral, resp. rate 18, height 172 cm (67.72\"), weight 105 kg (231 lb 7.7 oz), SpO2 95 %.  Vital signs are normal.  No fever.    Output: Producing urine.    HEENT: Normal HEENT exam.    Lungs:  Normal effort and normal respiratory rate.  He is not in respiratory distress.    Heart: Normal rate.  Regular rhythm.    Chest: Symmetric chest wall expansion.   Extremities: Normal range of motion.    Skin:  Warm and dry.    Abdomen: Abdomen is soft and non-distended.  Bowel sounds are normal.   There is no abdominal tenderness.     Pulses: Distal pulses are intact.        Neurologic Exam     Mental Status   Oriented to person, place, and time.   Attention: normal. Concentration: normal.   Speech: speech is normal   Level of consciousness: alert  Knowledge: good.   Normal comprehension.     Cranial Nerves   Cranial nerves II through XII intact.     Motor Exam   Muscle bulk: normalLeft hemiparesis     Sensory Exam   Light touch normal.         CVA (cerebral vascular accident) (CMS/HCC)    Left hemiparesis (CMS/HCC)    Benign essential HTN    Medical non-compliance    HLD (hyperlipidemia)    Alcohol use disorder, moderate, dependence (CMS/HCC)    Vocal cord polyp    Berry aneurysm      Lab Results (last 24 hours)     ** No results found for the last 24 hours. **              Plan:   CV: Heart rate and blood pressure stable  Respiratory: Oxygen level stable  GI: " Tolerating p.o.   adequate urine output  Neuro: Exam stable  Continue with physical and occupational therapy.  Discharge planning waiting on acute rehab.  Dr. Encinas is to talk to neurosurgeon at [facility] regarding aneurysm today for further recommendations and follow-up    Donald Biggs, DAREK

## 2019-08-12 NOTE — PLAN OF CARE
Problem: Patient Care Overview  Goal: Plan of Care Review  Outcome: Ongoing (interventions implemented as appropriate)   08/12/19 0758   Coping/Psychosocial   Plan of Care Reviewed With patient   Plan of Care Review   Progress improving   OTHER   Outcome Summary Pt CGA for bed mobility along with transfers and ambulation with RW. Pt requires hand over hand assist at times to maintain  on RW with L hand. Pt completed toileting task with CGA while standing at commode. Pt would benefit from acute rehab prior to discharge home. Continue OT POC

## 2019-08-12 NOTE — THERAPY TREATMENT NOTE
Acute Care - Occupational Therapy Treatment Note  [facility]     Patient Name: Olman Vaca  : 1970  MRN: [id number]  Today's Date: 2019  Onset of Illness/Injury or Date of Surgery: 19  Date of Referral to OT: 19  Referring Physician: Dr. Collado    Admit Date: 2019       ICD-10-CM ICD-9-CM   1. Cognitive and behavioral changes R41.89 799.59    R46.89 312.9   2. Impaired functional mobility, balance, gait, and endurance Z74.09 V49.89   3. Impaired mobility and ADLs Z74.09 799.89     Patient Active Problem List   Diagnosis   • Left hemiparesis (CMS/HCC)   • CVA (cerebral vascular accident) (CMS/HCC)   • Benign essential HTN   • Medical non-compliance   • HLD (hyperlipidemia)   • Alcohol use disorder, moderate, dependence (CMS/HCC)   • Vocal cord polyp   • Schuler aneurysm     Past Medical History:   Diagnosis Date   • Benign essential HTN 2019   • HLD (hyperlipidemia) 2019     Past Surgical History:   Procedure Laterality Date   • FEMORAL LYMPH NODE BIOPSY/EXCISON     • MOUTH SURGERY         Therapy Treatment    Rehabilitation Treatment Summary     Row Name 19 1026 19 0823          Treatment Time/Intention    Discipline  physical therapy assistant  -KJ  occupational therapy assistant  -TS     Document Type  therapy note (daily note)  -KJ2  therapy note (daily note)  -TS     Subjective Information  no complaints  -KJ2  complains of;weakness;fatigue  -TS2     Mode of Treatment  physical therapy  -KJ2  --     Comment  L side weakness  -KJ2  --     Existing Precautions/Restrictions  fall  -KJ2  fall  -TS2     Treatment Considerations/Comments  --  decreased coordination/weakness L side  -TS2     Recorded by [KJ] Arely Mar, PTA 19 1026  [KJ2] Arely Mar, PTA 19 1046 [TS] Mi Wallace COTA/L 19 0848  [TS2] Mi Wallace AGUSTIN/L 19 1331     Row Name 19 0823             Cognitive Assessment/Intervention- PT/OT     Personal Safety Interventions  fall prevention program maintained;gait belt;nonskid shoes/slippers when out of bed  -TS      Recorded by [TS] Mi Wallace COTA/L 08/12/19 1331      Row Name 08/12/19 1026 08/12/19 0823          Bed Mobility Assessment/Treatment    Bed Mobility Assessment/Treatment  --  supine-sit  -TS     Supine-Sit [place] (Bed Mobility)  verbal cues;contact guard  -KJ  contact guard;verbal cues  -TS     Assistive Device (Bed Mobility)  --  bed rails;head of bed elevated  -TS     Recorded by [KJ] Arely Mar, PTA 08/12/19 1046 [TS] Mi Wallace COTA/L 08/12/19 1331     Row Name 08/12/19 0823             Functional Mobility    Functional Mobility- Ind. Level  contact guard assist  -TS      Functional Mobility- Device  rolling walker  -TS      Functional Mobility- Safety Issues  step length decreased;sequencing ability decreased  -TS      Functional Mobility- Comment  requires assist to hold L hand on  of RW  -TS      Recorded by [TS] Mi Wallace COTA/L 08/12/19 1331      Row Name 08/12/19 0823             Transfer Assessment/Treatment    Transfer Assessment/Treatment  sit-stand transfer;stand-sit transfer  -TS      Recorded by [TS] Mi Wallace COTA/L 08/12/19 1331      Row Name 08/12/19 1026 08/12/19 0823          Sit-Stand Transfer    Sit-Stand [place] (Transfers)  verbal cues;contact guard  -KJ  contact guard  -TS     Assistive Device (Sit-Stand Transfers)  walker, front-wheeled  -KJ  walker, front-wheeled  -TS     Recorded by [KJ] Arely Mar, PTA 08/12/19 1046 [TS] iM Wallace AGUSTIN/L 08/12/19 1331     Row Name 08/12/19 1026 08/12/19 0823          Stand-Sit Transfer    Stand-Sit [place] (Transfers)  verbal cues;contact guard  -KJ  contact guard;verbal cues  -TS     Assistive Device (Stand-Sit Transfers)  walker, front-wheeled  -KJ  walker, front-wheeled  -TS     Recorded by [KJ] Arely Mar, PTA 08/12/19 1046 [TS]  Mi Wallace COTA/L 08/12/19 1331     Row Name 08/12/19 1026             Toilet Transfer    Type (Toilet Transfer)  sit-stand;stand-sit  -KJ      Wright Level (Toilet Transfer)  minimum assist (75% patient effort)  -KJ      Recorded by [KJ] Arely Mar, PTA 08/12/19 1046      Row Name 08/12/19 1026             Gait/Stairs Assessment/Training    Gait/Stairs Assessment/Training  gait/ambulation independence  -KJ      Wright Level (Gait)  minimum assist (75% patient effort);verbal cues  -KJ      Assistive Device (Gait)  walker, front-wheeled  -KJ      Distance in Feet (Gait)  35' x 2  -KJ      Pattern (Gait)  step-to  -KJ      Deviations/Abnormal Patterns (Gait)  left sided deviations;gait speed decreased;stride length decreased  -KJ      Bilateral Gait Deviations  heel strike decreased  -KJ      Comment (Gait/Stairs)  difficulty holding L hand on walker.  -KJ      Recorded by [KJ] Arely Mar, PTA 08/12/19 1046      Row Name 08/12/19 0823             ADL Assessment/Intervention    BADL Assessment/Intervention  toileting;grooming  -TS      Recorded by [TS] Mi Wallace COTA/L 08/12/19 1331      Row Name 08/12/19 0823             Grooming Assessment/Training    Wright Level (Grooming)  wash face, hands;set up;minimum assist (75% patient effort)  -TS      Grooming Position  supported sitting  -TS      Recorded by [TS] Mi Wallace COTA/L 08/12/19 1331      Row Name 08/12/19 0823             Toileting Assessment/Training    Wright Level (Toileting)  toileting skills;adjust/manage clothing;contact guard assist  -TS      Assistive Devices (Toileting)  commode  -TS      Toileting Position  supported standing  -TS      Recorded by [TS] Mi Wallace COTA/L 08/12/19 1331      Row Name 08/12/19 0823             Motor Skills Assessment/Interventions    Additional Documentation  Motor Coordination Assessment/Training (Group)  -TS      Recorded by [TS] [place]  VAMSI Sánchez/L 08/12/19 1331      Row Name 08/12/19 1026             Dynamic Balance Activity    Therapeutic Training Performed (Dynamic Balance)  side stepping;figure eights;backward walking  -KJ      Recorded by [KJ] Arely Mar, KATHRIN 08/12/19 1046      Row Name 08/12/19 0823             Motor Coordination Assessment/Training    Comment, Fine Motor Coordination Training  pt encouraged to try to lift lid off tray with LUE. Pt was able to form weak grasp but maintain grasp on lid and lift off tray without dropping. Pt encouraged to use LUE in ADL tasks and complete GM coordination tasks throughout day  -TS      Recorded by [TS] Mi Wallace COTA/L 08/12/19 1331      Row Name 08/12/19 1026 08/12/19 0823          Positioning and Restraints    Pre-Treatment Position  in bed  -KJ  in bed  -TS     Post Treatment Position  bed  -KJ  chair  -TS     In Chair  --  sitting;call light within reach;encouraged to call for assist  -TS     Recorded by [KJ] Arely Mar, KATHRIN 08/12/19 1046 [TS] Mi Wallace COTA/L 08/12/19 1331     Row Name 08/12/19 1026 08/12/19 0823          Pain Scale: Numbers Pre/Post-Treatment    Pain Scale: Numbers, Pretreatment  0/10 - no pain  -KJ  0/10 - no pain  -TS     Pain Scale: Numbers, Post-Treatment  0/10 - no pain  -KJ  0/10 - no pain  -TS     Recorded by [KJ] Arely Mar, KATHRIN 08/12/19 1046 [TS] Mi Wallace COTA/L 08/12/19 1331       User Key  (r) = Recorded By, (t) = Taken By, (c) = Cosigned By    Initials Name Effective Dates Discipline    KJ Arely Mar PTA 08/02/16 -  PT    TS Mi Wallace COTA/L 08/02/16 -  OT             Occupational Therapy Education     Title: PT OT SLP Therapies (In Progress)     Topic: Occupational Therapy (Done)     Point: ADL training (Done)     Description: Instruct learner(s) on proper safety adaptation and remediation techniques during self care or transfers.   Instruct in proper use of assistive devices.     Learning Progress Summary           Patient Acceptance, E, VU by MAGDIEL at 8/9/2019 11:19 AM    Comment:  ADL, transfer training, bed mobility, balance and safety to reduce fall risk, benefit of use of RW for stability, benefit of continued rehab at d/c                               User Key     Initials Effective Dates Name Provider Type Discipline    MAGDIEL 08/28/18 -  Yani Mcgowan, OTR/L Occupational Therapist OT                OT Recommendation and Plan  Outcome Summary/Treatment Plan (OT)  Daily Summary of Progress (OT): progress toward functional goals is good  Daily Summary of Progress (OT): progress toward functional goals is good  Plan of Care Review  Plan of Care Reviewed With: patient  Plan of Care Reviewed With: patient  Outcome Summary: Pt CGA for bed mobility along with transfers and ambulation with RW. Pt requires hand over hand assist at times to maintain  on RW with L hand. Pt completed toileting task with CGA while standing at commode. Pt would benefit from acute rehab prior to discharge home. Continue OT POC   Outcome Measures     Row Name 08/12/19 1300 08/11/19 1400 08/10/19 1400       How much help from another person do you currently need...    Turning from your back to your side while in flat bed without using bedrails?  --  3  -AH  --    Moving from lying on back to sitting on the side of a flat bed without bedrails?  --  3  -AH  --    Moving to and from a bed to a chair (including a wheelchair)?  --  3  -AH  --    Standing up from a chair using your arms (e.g., wheelchair, bedside chair)?  --  3  -AH  --    Climbing 3-5 steps with a railing?  --  2  -AH  --    To walk in hospital room?  --  3  -AH  --    AM-PAC 6 Clicks Score (PT)  --  17  -AH  --       How much help from another is currently needed...    Putting on and taking off regular lower body clothing?  3  -TS  --  3  -TS    Bathing (including washing, rinsing, and drying)  3  -TS  --  3  -TS    Toileting (which includes using toilet  bed pan or urinal)  3  -TS  --  3  -TS    Putting on and taking off regular upper body clothing  3  -TS  --  3  -TS    Taking care of personal grooming (such as brushing teeth)  3  -TS  --  3  -TS    Eating meals  4  -TS  --  4  -TS    AM-PAC 6 Clicks Score (OT)  19  -TS  --  19  -TS       Functional Assessment    Outcome Measure Options  AM-PAC 6 Clicks Daily Activity (OT)  -TS  AM-PAC 6 Clicks Basic Mobility (PT)  -  AM-PAC 6 Clicks Daily Activity (OT)  -TS    Row Name 08/10/19 1100             How much help from another person do you currently need...    Turning from your back to your side while in flat bed without using bedrails?  3  -AH      Moving from lying on back to sitting on the side of a flat bed without bedrails?  3  -AH      Moving to and from a bed to a chair (including a wheelchair)?  3  -AH      Standing up from a chair using your arms (e.g., wheelchair, bedside chair)?  3  -AH      Climbing 3-5 steps with a railing?  2  -AH      To walk in hospital room?  3  -AH      AM-PAC 6 Clicks Score (PT)  17  -         Functional Assessment    Outcome Measure Options  AM-PAC 6 Clicks Basic Mobility (PT)  -        User Key  (r) = Recorded By, (t) = Taken By, (c) = Cosigned By    Initials Name Provider Type     Mita Montes, KATHRIN Physical Therapy Assistant     iM Wallace, AGUSTIN/L Occupational Therapy Assistant           Time Calculation:   Time Calculation- OT     Row Name 08/12/19 1333             Time Calculation- OT    OT Start Time  0823  -TS      OT Stop Time  0905  -TS      OT Time Calculation (min)  42 min  -TS      Total Timed Code Minutes- OT  42 minute(s)  -TS      OT Received On  08/12/19  -         Timed Charges    67670 - OT Self Care/Mgmt Minutes  42  -TS        User Key  (r) = Recorded By, (t) = Taken By, (c) = Cosigned By    Initials Name Provider Type     Mi Wallace, AGUSTIN/L Occupational Therapy Assistant        Therapy Charges for Today     Code Description  Service Date Service Provider Modifiers Qty    [id number] HC OT SELF CARE/MGMT/TRAIN EA 15 MIN 8/12/2019 Mi Wallace, VAMSI/L GO 3               Mi CAVANAUGH. VAMSI Wallace/BAYLEE  8/12/2019

## 2019-08-12 NOTE — PLAN OF CARE
Problem: Patient Care Overview  Goal: Plan of Care Review  Outcome: Ongoing (interventions implemented as appropriate)   08/12/19 0519   Coping/Psychosocial   Plan of Care Reviewed With patient   Plan of Care Review   Progress no change   OTHER   Outcome Summary .VSS. No change in neuro status, NIH-4, CIWA-0 this shift. No c/o pain voiced. Pt u ambulate to br, leg does tend to buckle. SCD in place for part of shift. Safety maintained.       Problem: Stroke (Ischemic) (Adult)  Goal: Signs and Symptoms of Listed Potential Problems Will be Absent, Minimized or Managed (Stroke)  Outcome: Outcome(s) achieved Date Met: 08/12/19      Problem: Pain, Chronic (Adult)  Goal: Acceptable Pain/Comfort Level and Functional Ability  Outcome: Ongoing (interventions implemented as appropriate)      Problem: Skin Injury Risk (Adult)  Goal: Identify Related Risk Factors and Signs and Symptoms  Outcome: Ongoing (interventions implemented as appropriate)    Goal: Skin Health and Integrity  Outcome: Ongoing (interventions implemented as appropriate)      Problem: Fall Risk (Adult)  Goal: Identify Related Risk Factors and Signs and Symptoms  Outcome: Ongoing (interventions implemented as appropriate)    Goal: Absence of Fall  Outcome: Ongoing (interventions implemented as appropriate)

## 2019-08-12 NOTE — THERAPY TREATMENT NOTE
Acute Care - Physical Therapy Treatment Note  [facility]     Patient Name: Olman Vaca  : 1970  MRN: [id number]  Today's Date: 2019  Onset of Illness/Injury or Date of Surgery: 19  Date of Referral to PT: 19  Referring Physician: Dr. Collado    Admit Date: 2019    Visit Dx:    ICD-10-CM ICD-9-CM   1. Cognitive and behavioral changes R41.89 799.59    R46.89 312.9   2. Impaired functional mobility, balance, gait, and endurance Z74.09 V49.89   3. Impaired mobility and ADLs Z74.09 799.89     Patient Active Problem List   Diagnosis   • Left hemiparesis (CMS/HCC)   • CVA (cerebral vascular accident) (CMS/HCC)   • Benign essential HTN   • Medical non-compliance   • HLD (hyperlipidemia)   • Alcohol use disorder, moderate, dependence (CMS/HCC)   • Vocal cord polyp   • Berry aneurysm       Therapy Treatment    Rehabilitation Treatment Summary     Row Name 19 1531 19 1026 19 0823       Treatment Time/Intention    Discipline  physical therapy assistant  -KJ  physical therapy assistant  -KJ  occupational therapy assistant  -TS    Document Type  therapy note (daily note)  -KJ2  therapy note (daily note)  -KJ2  therapy note (daily note)  -TS    Subjective Information  no complaints  -KJ2  no complaints  -KJ2  complains of;weakness;fatigue  -TS2    Mode of Treatment  physical therapy  -KJ2  physical therapy  -KJ2  --    Comment  --  L side weakness  -KJ2  --    Existing Precautions/Restrictions  fall  -KJ2  fall  -KJ2  fall  -TS2    Treatment Considerations/Comments  left side weakness  -KJ2  --  decreased coordination/weakness L side  -TS2    Recorded by [KJ] Arely Mar, PTA 19 1532  [KJ2] Arely Mar, PTA 19 1606 [KJ] Arely Mar, PTA 19 1026  [KJ2] Arely Mar, PTA 19 1046 [TS] Mi Wallace AGUSTIN/L 19 0848  [TS2] Mi Wallace AGUSTIN/L 19 1331    Row Name 19 0823             Cognitive  Assessment/Intervention- PT/OT    Personal Safety Interventions  fall prevention program maintained;gait belt;nonskid shoes/slippers when out of bed  -TS      Recorded by [TS] Mi Wallace COTA/L 08/12/19 1331      Row Name 08/12/19 1531 08/12/19 1026 08/12/19 0823       Bed Mobility Assessment/Treatment    Bed Mobility Assessment/Treatment  --  --  supine-sit  -TS    Supine-Sit Richardson (Bed Mobility)  verbal cues;contact guard  -KJ  verbal cues;contact guard  -KJ  contact guard;verbal cues  -TS    Sit-Supine Richardson (Bed Mobility)  verbal cues;supervision  -KJ  --  --    Assistive Device (Bed Mobility)  --  --  bed rails;head of bed elevated  -TS    Recorded by [KJ] Arely Mar, PTA 08/12/19 1606 [KJ] Arely Mar, PTA 08/12/19 1046 [TS] Mi Wallace COTA/L 08/12/19 1331    Row Name 08/12/19 0823             Functional Mobility    Functional Mobility- Ind. Level  contact guard assist  -TS      Functional Mobility- Device  rolling walker  -TS      Functional Mobility- Safety Issues  step length decreased;sequencing ability decreased  -TS      Functional Mobility- Comment  requires assist to hold L hand on  of RW  -TS      Recorded by [TS] Mi Wallace COTA/L 08/12/19 1331      Row Name 08/12/19 0823             Transfer Assessment/Treatment    Transfer Assessment/Treatment  sit-stand transfer;stand-sit transfer  -TS      Recorded by [TS] Mi Wallace COTA/L 08/12/19 1331      Row Name 08/12/19 1531 08/12/19 1026 08/12/19 0823       Sit-Stand Transfer    Sit-Stand Richardson (Transfers)  verbal cues;contact guard  -KJ  verbal cues;contact guard  -KJ  contact guard  -TS    Assistive Device (Sit-Stand Transfers)  walker, front-wheeled  -KJ  walker, front-wheeled  -KJ  walker, front-wheeled  -TS    Recorded by [KJ] Arely Mar, PTA 08/12/19 1606 [KJ] Arely Mar, PTA 08/12/19 1046 [TS] Mi Wallace AGUSTIN/L 08/12/19 1331    Row Name 08/12/19 5614  08/12/19 1026 08/12/19 0823       Stand-Sit Transfer    Stand-Sit Petroleum (Transfers)  verbal cues;contact guard  -KJ  verbal cues;contact guard  -KJ  contact guard;verbal cues  -TS    Assistive Device (Stand-Sit Transfers)  walker, front-wheeled  -KJ  walker, front-wheeled  -KJ  walker, front-wheeled  -TS    Recorded by [KJ] Arely Mar, PTA 08/12/19 1606 [KJ] Arely Mar, PTA 08/12/19 1046 [TS] Mi Wallace COTA/L 08/12/19 1331    Row Name 08/12/19 1531 08/12/19 1026          Toilet Transfer    Type (Toilet Transfer)  sit-stand;stand-sit  -KJ  sit-stand;stand-sit  -KJ     Petroleum Level (Toilet Transfer)  minimum assist (75% patient effort)  -KJ  minimum assist (75% patient effort)  -KJ     Recorded by [KJ] Arely Mar, PTA 08/12/19 1606 [KJ] Arely Mar, [facility] 08/12/19 1046     Row Name 08/12/19 1531 08/12/19 1026          Gait/Stairs Assessment/Training    Gait/Stairs Assessment/Training  gait/ambulation independence  -KJ  gait/ambulation independence  -KJ     [place] Level (Gait)  minimum assist (75% patient effort);verbal cues  -KJ  minimum assist (75% patient effort);verbal cues  -KJ     Assistive Device (Gait)  walker, front-wheeled  -KJ  walker, front-wheeled  -KJ     Distance in Feet (Gait)  40' x 2' sitting rest  -KJ  35' x 2  -KJ     Pattern (Gait)  step-to  -KJ  step-to  -KJ     Deviations/Abnormal Patterns (Gait)  left sided deviations;gait speed decreased;stride length decreased  -KJ  left sided deviations;gait speed decreased;stride length decreased  -KJ     Bilateral Gait Deviations  heel strike decreased  -KJ  heel strike decreased  -KJ     Comment (Gait/Stairs)  --  difficulty holding L hand on walker.  -KJ     Recorded by [KJ] Arely Mar, PTA 08/12/19 1606 [KJ] Arely Mar, PTA 08/12/19 1046     Row Name 08/12/19 0823             ADL Assessment/Intervention    BADL Assessment/Intervention  toileting;grooming  -TS      Recorded by [TS] Madison  Mi CAVANAUGH AGUSTIN/L 08/12/19 1331      Row Name 08/12/19 0823             Grooming Assessment/Training    [place]el Level (Grooming)  wash face, hands;set up;minimum assist (75% patient effort)  -TS      Grooming Position  supported sitting  -TS      Recorded by [TS] Mi Wallace AGUSTIN/L 08/12/19 1331      Row Name 08/12/19 0823             Toileting Assessment/Training    [place]aysel Level (Toileting)  toileting skills;adjust/manage clothing;contact guard assist  -TS      Assistive Devices (Toileting)  commode  -TS      Toileting Position  supported standing  -TS      Recorded by [TS] Mi Wallace AGUSTIN/L 08/12/19 1331      Row Name 08/12/19 0823             Motor Skills Assessment/Interventions    Additional Documentation  Motor Coordination Assessment/Training (Group)  -TS      Recorded by [TS] Mi Wallace AGUSTIN/L 08/12/19 1331      Row Name 08/12/19 1531 08/12/19 1026          Dynamic Balance Activity    Therapeutic Training Performed (Dynamic Balance)  ambulate backward;side stepping  -KJ  side stepping;figure eights;backward walking  -KJ     Support Needed for Balance (Dynamic Balance Training)  uses both upper extremities for support  -KJ  --     Upper Extremity Activity with Device (Dynamic Balance Training)  weighted ball LUE yellow ball for PNF patterns  -KJ  --     Progressive Balance Activity (Dynamic Balance Training)  base of support narrowed during activity  -KJ  --     Comment (Dynamic Balance Training)  parallel bars- lateral weight shifting, tandem stance with A&P weight shifting, toe raises, squats, hamstring curls LLE, LAQ's, high marching in sitting/standing, dorsiflexion/plantarflexion,  -KJ  --     Recorded by [KJ] Arely Mar, PTA 08/12/19 1606 [KJ] Arely Mar, PTA 08/12/19 1046     Row Name 08/12/19 0823             Motor Coordination Assessment/Training    Comment, Fine Motor Coordination Training  pt encouraged to try to lift lid off tray with LUE. Pt  was able to form weak grasp but maintain grasp on lid and lift off tray without dropping. Pt encouraged to use LUE in ADL tasks and complete GM coordination tasks throughout day  -TS      Recorded by [TS] Mi Wallace COTA/L 08/12/19 1331      Row Name 08/12/19 1531 08/12/19 1026 08/12/19 0823       Positioning and Restraints    Pre-Treatment Position  in bed  -KJ  in bed  -KJ  in bed  -TS    Post Treatment Position  bed  -KJ  bed  -KJ  chair  -TS    In Chair  --  --  sitting;call light within reach;encouraged to call for assist  -TS    Recorded by [KJ] Arely Mar, PTA 08/12/19 1606 [KJ] Arely Mar, PTA 08/12/19 1046 [TS] Mi Wallace COTA/L 08/12/19 1331    Row Name 08/12/19 1531 08/12/19 1026 08/12/19 0823       Pain Scale: Numbers Pre/Post-Treatment    Pain Scale: Numbers, Pretreatment  0/10 - no pain  -KJ  0/10 - no pain  -KJ  0/10 - no pain  -TS    Pain Scale: Numbers, Post-Treatment  0/10 - no pain  -KJ  0/10 - no pain  -KJ  0/10 - no pain  -TS    Recorded by [KJ] Arely Mar, PTA 08/12/19 1606 [KJ] Arely Mar, PTA 08/12/19 1046 [TS] Mi Wallace COTA/L 08/12/19 1331      User Key  (r) = Recorded By, (t) = Taken By, (c) = Cosigned By    Initials Name Effective Dates Discipline     Arely Mar, PTA 08/02/16 -  PT    TS Mi Wallace COTA/L 08/02/16 -  OT                   Physical Therapy Education     Title: PT OT SLP Therapies (In Progress)     Topic: Physical Therapy (In Progress)     Point: Mobility training (Done)     Learning Progress Summary           Patient Acceptance, E,TB, VU by KOSTA at 8/10/2019 11:25 AM    Comment:  gait with rwx    Acceptance E VU by AL at 8/9/2019 11:10 AM    Comment:  PT educated use of RW with transfers and gait                   Point: Precautions (Done)     Learning Progress Summary           Patient Acceptance, E,TB, VU,NR by  at 8/11/2019  2:37 PM    Comment:  need for assist due to L side weakness    Acceptance,  SANTY WINTER by AL at 8/9/2019 11:10 AM    Comment:  PT educated patient on fall risk and need for assist                               User Key     Initials Effective Dates Name Provider Type Discipline     08/02/16 -  Mita Montes, PTA Physical Therapy Assistant PT    AL 04/24/19 -  Hernan Rodriguez, PT Student PT Student PT                PT Recommendation and Plan     Plan of Care Reviewed With: patient  Progress: improving  Outcome Summary: PT tx completed.Pt supine in bed with no c/o. S bed mobility, CG/Leonel sit<>stand, amb 40' x 2 with r wx MiinA. Cues for gait sequence and mechanics. L UE/LE increased weakness. Benefit  from rehab.  Outcome Measures     Row Name 08/12/19 1300 08/11/19 1400 08/10/19 1400       How much help from another person do you currently need...    Turning from your back to your side while in flat bed without using bedrails?  --  3  -AH  --    Moving from lying on back to sitting on the side of a flat bed without bedrails?  --  3  -AH  --    Moving to and from a bed to a chair (including a wheelchair)?  --  3  -AH  --    Standing up from a chair using your arms (e.g., wheelchair, bedside chair)?  --  3  -AH  --    Climbing 3-5 steps with a railing?  --  2  -AH  --    To walk in hospital room?  --  3  -AH  --    AM-PAC 6 Clicks Score (PT)  --  17  -AH  --       How much help from another is currently needed...    Putting on and taking off regular lower body clothing?  3  -TS  --  3  -TS    Bathing (including washing, rinsing, and drying)  3  -TS  --  3  -TS    Toileting (which includes using toilet bed pan or urinal)  3  -TS  --  3  -TS    Putting on and taking off regular upper body clothing  3  -TS  --  3  -TS    Taking care of personal grooming (such as brushing teeth)  3  -TS  --  3  -TS    Eating meals  4  -TS  --  4  -TS    AM-PAC 6 Clicks Score (OT)  19  -TS  --  19  -TS       Functional Assessment    Outcome Measure Options  AM-PAC 6 Clicks Daily Activity (OT)  -TS  AM-PAC 6 Clicks Basic  Mobility (PT)  -  AM-PAC 6 Clicks Daily Activity (OT)  -TS    Row Name 08/10/19 1100             How much help from another person do you currently need...    Turning from your back to your side while in flat bed without using bedrails?  3  -AH      Moving from lying on back to sitting on the side of a flat bed without bedrails?  3  -AH      Moving to and from a bed to a chair (including a wheelchair)?  3  -AH      Standing up from a chair using your arms (e.g., wheelchair, bedside chair)?  3  -AH      Climbing 3-5 steps with a railing?  2  -AH      To walk in hospital room?  3  -AH      AM-PAC 6 Clicks Score (PT)  17  -         Functional Assessment    Outcome Measure Options  AM-PAC 6 Clicks Basic Mobility (PT)  -        User Key  (r) = Recorded By, (t) = Taken By, (c) = Cosigned By    Initials Name Provider Type     Mita Montes PTA Physical Therapy Assistant     Mi Wallace, VAMSI/L Occupational Therapy Assistant         Time Calculation:   PT Charges     Row Name 08/12/19 1606 08/12/19 1142          Time Calculation    Start Time  1531  -KJ  1026  -KJ     Stop Time  1609  -KJ  1052  -KJ     Time Calculation (min)  38 min  -KJ  26 min  -KJ     PT Received On  08/12/19  -KJ  08/12/19  -KJ     PT Goal Re-Cert Due Date  08/19/19  -KJ  08/19/19  -KJ        Time Calculation- PT    Total Timed Code Minutes- PT  38 minute(s)  -KJ  26 minute(s)  -KJ       User Key  (r) = Recorded By, (t) = Taken By, (c) = Cosigned By    Initials Name Provider Type    Arely Thompson PTA Physical Therapy Assistant        Therapy Charges for Today     Code Description Service Date Service Provider Modifiers Qty    35882660954 HC GAIT TRAINING EA 15 MIN 8/12/2019 Arely Mar, PTA GP 1    33311248055 HC PT THER PROC EA 15 MIN 8/12/2019 Arely Mar, KATHRIN GP 1    02758057750 HC GAIT TRAINING EA 15 MIN 8/12/2019 Arely Mar, KATHRIN GP 2    25864028570 HC PT THER PROC EA 15 MIN 8/12/2019 Arely Mar, KATHRIN GP  1          PT G-Codes  Outcome Measure Options: AM-PAC 6 Clicks Daily Activity (OT)  AM-PAC 6 Clicks Score (PT): 17  AM-PAC 6 Clicks Score (OT): 19  Modified Nicholas Scale: 4 - Moderately severe disability.  Unable to walk without assistance, and unable to attend to own bodily needs without assistance.    Arely Mar, PTA  8/12/2019

## 2019-08-12 NOTE — THERAPY TREATMENT NOTE
Acute Care - Physical Therapy Treatment Note  [facility]     Patient Name: Olman Vaca  : 1970  MRN: [id number]  Today's Date: 2019  Onset of Illness/Injury or Date of Surgery: 19  Date of Referral to PT: 19  Referring Physician: Dr. Collado    Admit Date: 2019    Visit Dx:    ICD-10-CM ICD-9-CM   1. Cognitive and behavioral changes R41.89 799.59    R46.89 312.9   2. Impaired functional mobility, balance, gait, and endurance Z74.09 V49.89   3. Impaired mobility and ADLs Z74.09 799.89     Patient Active Problem List   Diagnosis   • Left hemiparesis (CMS/HCC)   • CVA (cerebral vascular accident) (CMS/HCC)   • Benign essential HTN   • Medical non-compliance   • HLD (hyperlipidemia)   • Alcohol use disorder, moderate, dependence (CMS/HCC)   • Vocal cord polyp   • Berry aneurysm       Therapy Treatment    Rehabilitation Treatment Summary     Row Name 19 1026 19 0823          Treatment Time/Intention    Discipline  physical therapy assistant  -KJ  occupational therapy assistant  -TS     Document Type  therapy note (daily note)  -KJ2  therapy note (daily note)  -TS     Subjective Information  no complaints  -KJ2  --     Mode of Treatment  physical therapy  -KJ2  --     Comment  L side weakness  -KJ2  --     Existing Precautions/Restrictions  fall  -KJ2  --     Recorded by [KJ] Arely Mar, PTA 19 1026  [KJ2] Arely Mar, PTA 19 1046 [TS] Mi Wallace COTA/L 19 0848     Row Name 19 1026             Bed Mobility Assessment/Treatment    Supine-Sit Tenino (Bed Mobility)  verbal cues;contact guard  -KJ      Recorded by [KJ] Arely Mar, PTA 19 1046      Row Name 19 1026             Sit-Stand Transfer    Sit-Stand Tenino (Transfers)  verbal cues;contact guard  -KJ      Assistive Device (Sit-Stand Transfers)  walker, front-wheeled  -KJ      Recorded by [KJ] Arely Mar, PTA 19 1046      Row Name 19  1026             Stand-Sit Transfer    Stand-Sit Tucker (Transfers)  verbal cues;contact guard  -KJ      Assistive Device (Stand-Sit Transfers)  walker, front-wheeled  -KJ      Recorded by [KJ] Arely Mar, [facility] 08/12/19 1046      Row Name 08/12/19 1026             Toilet Transfer    Type (Toilet Transfer)  sit-stand;stand-sit  -KJ      Tucker Level (Toilet Transfer)  minimum assist (75% patient effort)  -KJ      Recorded by [KJ] Arely Mar, [facility] 08/12/19 1046      Row Name 08/12/19 1026             Gait/Stairs Assessment/Training    Gait/Stairs Assessment/Training  gait/ambulation independence  -KJ      Tucker Level (Gait)  minimum assist (75% patient effort);verbal cues  -KJ      Assistive Device (Gait)  walker, front-wheeled  -KJ      Distance in Feet (Gait)  35' x 2  -KJ      Pattern (Gait)  step-to  -KJ      Deviations/Abnormal Patterns (Gait)  left sided deviations;gait speed decreased;stride length decreased  -KJ      Bilateral Gait Deviations  heel strike decreased  -KJ      Comment (Gait/Stairs)  difficulty holding L hand on walker.  -KJ      Recorded by [KJ] Arely Mar, [facility] 08/12/19 1046      Row Name 08/12/19 1026             Dynamic Balance Activity    Therapeutic Training Performed (Dynamic Balance)  side stepping;figure eights;backward walking  -KJ      Recorded by [KJ] Arely Mar, [facility] 08/12/19 1046      Row Name 08/12/19 1026             Positioning and Restraints    Pre-Treatment Position  in bed  -KJ      Post Treatment Position  bed  -KJ      Recorded by [KJ] Arely Mar, [facility] 08/12/19 1046      Row Name 08/12/19 1026             Pain Scale: Numbers Pre/Post-Treatment    Pain Scale: Numbers, Pretreatment  0/10 - no pain  -KJ      Pain Scale: Numbers, Post-Treatment  0/10 - no pain  -KJ      Recorded by [KJ] Arely Mar, [facility] 08/12/19 1046        User Key  (r) = Recorded By, (t) = Taken By, (c) = Cosigned By    Initials Name Effective Dates Discipline    KJ  Arely Mar, KATHRIN 08/02/16 -  PT    TS Mi Wallace, AGUSTIN/L 08/02/16 -  OT                   Physical Therapy Education     Title: PT OT SLP Therapies (In Progress)     Topic: Physical Therapy (In Progress)     Point: Mobility training (Done)     Learning Progress Summary           Patient Acceptance, E,TB, VU by  at 8/10/2019 11:25 AM    Comment:  gait with rwx    Acceptance, E, VU by AL at 8/9/2019 11:10 AM    Comment:  PT educated use of RW with transfers and gait                   Point: Precautions (Done)     Learning Progress Summary           Patient Acceptance, E,TB, VU,NR by  at 8/11/2019  2:37 PM    Comment:  need for assist due to L side weakness    Acceptance, E, VU by AL at 8/9/2019 11:10 AM    Comment:  PT educated patient on fall risk and need for assist                               User Key     Initials Effective Dates Name Provider Type Discipline     08/02/16 -  Mita Montes, PTA Physical Therapy Assistant PT    AL 04/24/19 -  Hernan Rodriguez, PT Student PT Student PT                PT Recommendation and Plan     Plan of Care Reviewed With: patient  Progress: improving  Outcome Summary: PT tx completed.Pt supine in bed with no c/o. S bed mobility, CG/Leonel sit<>stand, amb 40' x 2 with r wx MiinA. Cues for gait sequence and mechanics. L UE/LE increased weakness. Benefit  from rehab.  Outcome Measures     Row Name 08/11/19 1400 08/10/19 1400 08/10/19 1100       How much help from another person do you currently need...    Turning from your back to your side while in flat bed without using bedrails?  3  -AH  --  3  -AH    Moving from lying on back to sitting on the side of a flat bed without bedrails?  3  -AH  --  3  -AH    Moving to and from a bed to a chair (including a wheelchair)?  3  -AH  --  3  -AH    Standing up from a chair using your arms (e.g., wheelchair, bedside chair)?  3  -AH  --  3  -AH    Climbing 3-5 steps with a railing?  2  -AH  --  2  -AH    To walk in hospital room?  3   -  --  3  -AH    AM-PAC 6 Clicks Score (PT)  17  -AH  --  17  -AH       How much help from another is currently needed...    Putting on and taking off regular lower body clothing?  --  3  -TS  --    Bathing (including washing, rinsing, and drying)  --  3  -TS  --    Toileting (which includes using toilet bed pan or urinal)  --  3  -TS  --    Putting on and taking off regular upper body clothing  --  3  -TS  --    Taking care of personal grooming (such as brushing teeth)  --  3  -TS  --    Eating meals  --  4  -TS  --    AM-PAC 6 Clicks Score (OT)  --  19  -TS  --       Functional Assessment    Outcome Measure Options  AM-PAC 6 Clicks Basic Mobility (PT)  -  AM-PAC 6 Clicks Daily Activity (OT)  -  AM-PAC 6 Clicks Basic Mobility (PT)  -      User Key  (r) = Recorded By, (t) = Taken By, (c) = Cosigned By    Initials Name Provider Type     Mita Montes PTA Physical Therapy Assistant     Mi Wallace COTA/L Occupational Therapy Assistant         Time Calculation:   PT Charges     Row Name 08/12/19 1142             Time Calculation    Start Time  1026  -KJ      Stop Time  1052  -KJ      Time Calculation (min)  26 min  -KJ      PT Received On  08/12/19  -KJ      PT Goal Re-Cert Due Date  08/19/19  -KJ         Time Calculation- PT    Total Timed Code Minutes- PT  26 minute(s)  -KJ        User Key  (r) = Recorded By, (t) = Taken By, (c) = Cosigned By    Initials Name Provider Type    Arely Thompson PTA Physical Therapy Assistant        Therapy Charges for Today     Code Description Service Date Service Provider Modifiers Qty    02256189033 HC GAIT TRAINING EA 15 MIN 8/12/2019 Arely Mar PTA GP 1    99571330214 HC PT THER PROC EA 15 MIN 8/12/2019 Arely Mar PTA GP 1          PT G-Codes  Outcome Measure Options: AM-PAC 6 Clicks Basic Mobility (PT)  AM-PAC 6 Clicks Score (PT): 17  AM-PAC 6 Clicks Score (OT): 19  Modified Evans Scale: 4 - Moderately severe disability.  Unable to walk  without assistance, and unable to attend to own bodily needs without assistance.    Aerly Mar, PTA  8/12/2019

## 2019-08-12 NOTE — PROGRESS NOTES
Continued Stay Note   [facility]     Patient Name: Olman Vaca  MRN: [id number]  Today's Date: 8/12/2019    Admit Date: 8/8/2019    Discharge Plan     Row Name 08/12/19 1450       Plan    Plan Comments  PT HAS A MEDICAID ID NUMBER. FLORIAN IS HERE TO EVAL AND HAS OFFERED A BED. PRECERT IS BEING STARTED TODAY. AWAIT INSURANCE DECISION .        Discharge Codes    No documentation.             XANDER Loo

## 2019-08-12 NOTE — PLAN OF CARE
Problem: Patient Care Overview  Goal: Interprofessional Rounds/Family Conf   08/12/19 1645   Interdisciplinary Rounds/Family Conf   Summary A&O x4. NIH:4 Pt has left sided weakness. Up x 1 with assitance to toilet. CIWA: 1 Pt refused senokot. BM: 8/11. No neuro changes noted this shift. Patient safety maintained this shift. Will continue to monitor. Possible DC to [facility] tomorrow pending insurance approval.        Problem: Pain, Chronic (Adult)  Goal: Acceptable Pain/Comfort Level and Functional Ability  Outcome: Ongoing (interventions implemented as appropriate)      Problem: Skin Injury Risk (Adult)  Goal: Identify Related Risk Factors and Signs and Symptoms  Outcome: Ongoing (interventions implemented as appropriate)    Goal: Skin Health and Integrity  Outcome: Ongoing (interventions implemented as appropriate)      Problem: Fall Risk (Adult)  Goal: Identify Related Risk Factors and Signs and Symptoms  Outcome: Ongoing (interventions implemented as appropriate)    Goal: Absence of Fall  Outcome: Ongoing (interventions implemented as appropriate)

## 2019-08-13 ENCOUNTER — HOSPITAL ENCOUNTER (INPATIENT)
Age: 49
LOS: 8 days | Discharge: HOME | DRG: 057 | End: 2019-08-21
Attending: PSYCHIATRY & NEUROLOGY | Admitting: PSYCHIATRY & NEUROLOGY
Payer: MEDICAID

## 2019-08-13 VITALS
BODY MASS INDEX: 35.08 KG/M2 | TEMPERATURE: 98.2 F | HEART RATE: 71 BPM | DIASTOLIC BLOOD PRESSURE: 80 MMHG | OXYGEN SATURATION: 91 % | WEIGHT: 231.48 LBS | RESPIRATION RATE: 16 BRPM | HEIGHT: 68 IN | SYSTOLIC BLOOD PRESSURE: 135 MMHG

## 2019-08-13 DIAGNOSIS — I63.9 ACUTE ISCHEMIC STROKE (HCC): ICD-10-CM

## 2019-08-13 DIAGNOSIS — I73.9 CLAUDICATION OF LEFT LOWER EXTREMITY (HCC): Primary | ICD-10-CM

## 2019-08-13 LAB
BASOPHILS ABSOLUTE: 0 K/UL (ref 0–0.2)
BASOPHILS RELATIVE PERCENT: 0.4 % (ref 0–1)
BILIRUBIN URINE: NEGATIVE
BLOOD, URINE: NEGATIVE
CLARITY: CLEAR
COLOR: YELLOW
EOSINOPHILS ABSOLUTE: 0.2 K/UL (ref 0–0.6)
EOSINOPHILS RELATIVE PERCENT: 2 % (ref 0–5)
GLUCOSE URINE: NEGATIVE MG/DL
HCT VFR BLD CALC: 49.5 % (ref 42–52)
HEMOGLOBIN: 16.3 G/DL (ref 14–18)
KETONES, URINE: NEGATIVE MG/DL
LEUKOCYTE ESTERASE, URINE: NEGATIVE
LYMPHOCYTES ABSOLUTE: 3.1 K/UL (ref 1.1–4.5)
LYMPHOCYTES RELATIVE PERCENT: 31 % (ref 20–40)
MCH RBC QN AUTO: 31.9 PG (ref 27–31)
MCHC RBC AUTO-ENTMCNC: 32.9 G/DL (ref 33–37)
MCV RBC AUTO: 96.9 FL (ref 80–94)
MONOCYTES ABSOLUTE: 1 K/UL (ref 0–0.9)
MONOCYTES RELATIVE PERCENT: 10.2 % (ref 0–10)
NEUTROPHILS ABSOLUTE: 5.6 K/UL (ref 1.5–7.5)
NEUTROPHILS RELATIVE PERCENT: 56 % (ref 50–65)
NITRITE, URINE: NEGATIVE
PDW BLD-RTO: 13.3 % (ref 11.5–14.5)
PH UA: 6 (ref 5–8)
PLATELET # BLD: 213 K/UL (ref 130–400)
PMV BLD AUTO: 10.2 FL (ref 9.4–12.4)
PREALBUMIN: 31 MG/DL (ref 20–40)
PROTEIN UA: NEGATIVE MG/DL
RBC # BLD: 5.11 M/UL (ref 4.7–6.1)
SPECIFIC GRAVITY UA: 1.02 (ref 1–1.03)
URINE REFLEX TO CULTURE: NORMAL
UROBILINOGEN, URINE: 0.2 E.U./DL
WBC # BLD: 10 K/UL (ref 4.8–10.8)

## 2019-08-13 PROCEDURE — 85025 COMPLETE CBC W/AUTO DIFF WBC: CPT

## 2019-08-13 PROCEDURE — 84134 ASSAY OF PREALBUMIN: CPT

## 2019-08-13 PROCEDURE — 81003 URINALYSIS AUTO W/O SCOPE: CPT

## 2019-08-13 PROCEDURE — 99231 SBSQ HOSP IP/OBS SF/LOW 25: CPT | Performed by: NURSE PRACTITIONER

## 2019-08-13 PROCEDURE — 6370000000 HC RX 637 (ALT 250 FOR IP): Performed by: PSYCHIATRY & NEUROLOGY

## 2019-08-13 PROCEDURE — 1180000000 HC REHAB R&B

## 2019-08-13 PROCEDURE — 36415 COLL VENOUS BLD VENIPUNCTURE: CPT

## 2019-08-13 RX ORDER — TERAZOSIN 5 MG/1
5 CAPSULE ORAL DAILY
Start: 2019-08-14

## 2019-08-13 RX ORDER — SENNA AND DOCUSATE SODIUM 50; 8.6 MG/1; MG/1
2 TABLET, FILM COATED ORAL 2 TIMES DAILY
Start: 2019-08-13

## 2019-08-13 RX ORDER — ASPIRIN 81 MG/1
81 TABLET, CHEWABLE ORAL DAILY
Status: DISCONTINUED | OUTPATIENT
Start: 2019-08-13 | End: 2019-08-21 | Stop reason: HOSPADM

## 2019-08-13 RX ORDER — AMLODIPINE BESYLATE 10 MG/1
10 TABLET ORAL
Start: 2019-08-14

## 2019-08-13 RX ORDER — AMLODIPINE BESYLATE 10 MG/1
10 TABLET ORAL DAILY
Status: ON HOLD | COMMUNITY
End: 2019-08-21 | Stop reason: SDUPTHER

## 2019-08-13 RX ORDER — ACETAMINOPHEN 325 MG/1
650 TABLET ORAL EVERY 4 HOURS PRN
Status: DISCONTINUED | OUTPATIENT
Start: 2019-08-13 | End: 2019-08-21 | Stop reason: HOSPADM

## 2019-08-13 RX ORDER — MAGNESIUM HYDROXIDE/ALUMINUM HYDROXICE/SIMETHICONE 120; 1200; 1200 MG/30ML; MG/30ML; MG/30ML
30 SUSPENSION ORAL EVERY 6 HOURS PRN
Status: DISCONTINUED | OUTPATIENT
Start: 2019-08-13 | End: 2019-08-13

## 2019-08-13 RX ORDER — CALCIUM CARBONATE 200(500)MG
500 TABLET,CHEWABLE ORAL 3 TIMES DAILY PRN
Status: DISCONTINUED | OUTPATIENT
Start: 2019-08-13 | End: 2019-08-21 | Stop reason: HOSPADM

## 2019-08-13 RX ORDER — NICOTINE 21 MG/24HR
1 PATCH, TRANSDERMAL 24 HOURS TRANSDERMAL EVERY 24 HOURS
Status: DISCONTINUED | OUTPATIENT
Start: 2019-08-13 | End: 2019-08-21 | Stop reason: HOSPADM

## 2019-08-13 RX ORDER — ATORVASTATIN CALCIUM 80 MG/1
80 TABLET, FILM COATED ORAL NIGHTLY
Start: 2019-08-13

## 2019-08-13 RX ORDER — TERAZOSIN 5 MG/1
5 CAPSULE ORAL DAILY
Status: ON HOLD | COMMUNITY
End: 2019-08-21 | Stop reason: SDUPTHER

## 2019-08-13 RX ORDER — SENNA AND DOCUSATE SODIUM 50; 8.6 MG/1; MG/1
2 TABLET, FILM COATED ORAL 2 TIMES DAILY
Status: DISCONTINUED | OUTPATIENT
Start: 2019-08-13 | End: 2019-08-21 | Stop reason: HOSPADM

## 2019-08-13 RX ORDER — ATORVASTATIN CALCIUM 80 MG/1
80 TABLET, FILM COATED ORAL NIGHTLY
Status: ON HOLD | COMMUNITY
End: 2019-08-21 | Stop reason: SDUPTHER

## 2019-08-13 RX ORDER — PANTOPRAZOLE SODIUM 40 MG/1
40 TABLET, DELAYED RELEASE ORAL 2 TIMES DAILY
Status: DISCONTINUED | OUTPATIENT
Start: 2019-08-13 | End: 2019-08-21 | Stop reason: HOSPADM

## 2019-08-13 RX ORDER — AMLODIPINE BESYLATE 10 MG/1
10 TABLET ORAL DAILY
Status: DISCONTINUED | OUTPATIENT
Start: 2019-08-13 | End: 2019-08-21 | Stop reason: HOSPADM

## 2019-08-13 RX ORDER — POLYETHYLENE GLYCOL 3350 17 G/17G
17 POWDER, FOR SOLUTION ORAL DAILY
Status: DISCONTINUED | OUTPATIENT
Start: 2019-08-13 | End: 2019-08-21 | Stop reason: HOSPADM

## 2019-08-13 RX ORDER — ASPIRIN 81 MG/1
81 TABLET, CHEWABLE ORAL DAILY
Start: 2019-08-14

## 2019-08-13 RX ORDER — DOXAZOSIN MESYLATE 4 MG/1
4 TABLET ORAL DAILY
Status: DISCONTINUED | OUTPATIENT
Start: 2019-08-13 | End: 2019-08-21 | Stop reason: HOSPADM

## 2019-08-13 RX ORDER — MAGNESIUM HYDROXIDE/ALUMINUM HYDROXICE/SIMETHICONE 120; 1200; 1200 MG/30ML; MG/30ML; MG/30ML
30 SUSPENSION ORAL EVERY 4 HOURS PRN
Status: DISCONTINUED | OUTPATIENT
Start: 2019-08-13 | End: 2019-08-21 | Stop reason: HOSPADM

## 2019-08-13 RX ORDER — SENNA AND DOCUSATE SODIUM 50; 8.6 MG/1; MG/1
2 TABLET, FILM COATED ORAL 2 TIMES DAILY
Status: ON HOLD | COMMUNITY
End: 2019-08-21 | Stop reason: HOSPADM

## 2019-08-13 RX ORDER — NICOTINE 21 MG/24HR
1 PATCH, TRANSDERMAL 24 HOURS TRANSDERMAL EVERY 24 HOURS
Start: 2019-08-13 | End: 2019-09-09 | Stop reason: DRUGHIGH

## 2019-08-13 RX ORDER — DOCUSATE SODIUM 100 MG/1
100 CAPSULE, LIQUID FILLED ORAL 2 TIMES DAILY
Status: DISCONTINUED | OUTPATIENT
Start: 2019-08-13 | End: 2019-08-21 | Stop reason: HOSPADM

## 2019-08-13 RX ORDER — NICOTINE 21 MG/24HR
PATCH, TRANSDERMAL 24 HOURS TRANSDERMAL EVERY 24 HOURS
Status: ON HOLD | COMMUNITY
End: 2019-08-21 | Stop reason: HOSPADM

## 2019-08-13 RX ORDER — BISACODYL 10 MG
10 SUPPOSITORY, RECTAL RECTAL DAILY PRN
Status: DISCONTINUED | OUTPATIENT
Start: 2019-08-13 | End: 2019-08-21 | Stop reason: HOSPADM

## 2019-08-13 RX ORDER — ASPIRIN 81 MG/1
81 TABLET, CHEWABLE ORAL DAILY
Status: ON HOLD | COMMUNITY
End: 2019-08-21 | Stop reason: SDUPTHER

## 2019-08-13 RX ORDER — ATORVASTATIN CALCIUM 80 MG/1
80 TABLET, FILM COATED ORAL NIGHTLY
Status: DISCONTINUED | OUTPATIENT
Start: 2019-08-13 | End: 2019-08-21 | Stop reason: HOSPADM

## 2019-08-13 RX ADMIN — ANTACID TABLETS 500 MG: 500 TABLET, CHEWABLE ORAL at 18:46

## 2019-08-13 RX ADMIN — ANTACID TABLETS 500 MG: 500 TABLET, CHEWABLE ORAL at 20:10

## 2019-08-13 RX ADMIN — ATORVASTATIN CALCIUM 80 MG: 80 TABLET, FILM COATED ORAL at 20:10

## 2019-08-13 RX ADMIN — SENNOSIDES AND DOCUSATE SODIUM 2 TABLET: 8.6; 5 TABLET ORAL at 09:03

## 2019-08-13 RX ADMIN — ALUMINUM HYDROXIDE, MAGNESIUM HYDROXIDE, AND SIMETHICONE 30 ML: 200; 200; 20 SUSPENSION ORAL at 21:55

## 2019-08-13 RX ADMIN — ALUMINUM HYDROXIDE, MAGNESIUM HYDROXIDE, AND DIMETHICONE 15 ML: 400; 400; 40 SUSPENSION ORAL at 14:35

## 2019-08-13 RX ADMIN — ALUMINUM HYDROXIDE, MAGNESIUM HYDROXIDE, AND DIMETHICONE 15 ML: 400; 400; 40 SUSPENSION ORAL at 01:28

## 2019-08-13 RX ADMIN — AMLODIPINE BESYLATE 10 MG: 10 TABLET ORAL at 09:03

## 2019-08-13 RX ADMIN — TERAZOSIN HYDROCHLORIDE 5 MG: 5 CAPSULE ORAL at 09:03

## 2019-08-13 RX ADMIN — PANTOPRAZOLE SODIUM 40 MG: 40 TABLET, DELAYED RELEASE ORAL at 21:55

## 2019-08-13 RX ADMIN — ASPIRIN 81 MG 81 MG: 81 TABLET ORAL at 09:05

## 2019-08-13 RX ADMIN — SODIUM CHLORIDE, PRESERVATIVE FREE 3 ML: 5 INJECTION INTRAVENOUS at 09:06

## 2019-08-13 NOTE — DISCHARGE SUMMARY
[facility]  DISCHARGE SUMMARY       Date of Admission: 8/8/2019  Date of Discharge:  8/13/2019  Primary Care Physician: Gordon Robles MD    Discharge Diagnoses:  Patient Active Problem List   Diagnosis   • Left hemiparesis (CMS/HCC)   • CVA (cerebral vascular accident) (CMS/HCC)   • Benign essential HTN   • Medical non-compliance   • HLD (hyperlipidemia)   • Alcohol use disorder, moderate, dependence (CMS/HCC)   • Vocal cord polyp   • Berry aneurysm         Presenting Problem/History of Present Illness:  CVA (cerebral vascular accident) (CMS/HCC) [I63.9]     Chief Complaint on Day of Discharge:   No complaint    History of Present Illness on Day of Discharge:   The patient continues to work diligently with physical therapy.  He has been accepted to acute rehab at [facility] and will be transferred there today.    Hospital Course  This 49-year-old white male was admitted with chief complaint of dizziness, facial numbness, left arm and leg weakness with numbnessand inability to appropriately use his left side.  The patient's symptoms began last night at about midnight.  He did not seek assistance until today when he presented to [facility] emergency department for further treatment evaluation.  Patient has a history of essential hypertension and hyperlipidemia for which she has been prescribed medications in the past.  The patient has been noncompliant with treatment and has not taken those medications in years.     Work-up at [facility] as an EKG without significant abnormality, CT scan of the head showing calcification of the right vertebral artery and both carotid arteries but no other acute abnormalities.  Chest x-ray showed heart and upper limits of normal in size otherwise no acute abnormality.  CBC was within normal limits.  Urinalysis with within normal limits.  CMP within normal limits except for glucose of 133.  Cardiac markers  were negative.  Brain natruretic peptide was elevated at 1230.    PLAN:   Admit to the neurological floor  Cardiac monitoring  Stat MRI scan of the brain without contrast  Stat CT angiogram of the head and neck  Aspirin 81 mg p.o. daily  High-dose statin daily  PT/OT/ST evaluation and treatment  Permissive hypertension  Neurology consultation  SCDs for DVT prophylaxis  Thiamine 100 mg IV daily x3 doses  Ativan per [facility] protocol    MRI scan of the brain showed extensive infarct in the right PCA territory.  CT angiogram of the neck showed insignificant stenosis of both ICAs at 20%.  There is a polypoid lesion associated with a left vocal fold.  ENT was consulted and felt that the lesion was benign and suggested outpatient follow-up.  CT angiogram of the head suggested steno-occlusive disease of the P2 segment of the right PCA.  A 6 mm laterally projecting aneurysm at the right MCA trifurcation was noted.  Neurosurgery was consulted to further evaluate that abnormality and make recommendations.  Physical therapy evaluated the patient and recommended acute rehab.  Permissive hypertension was not allowed because of the presence of the 6 mm berry aneurysm.  Repeat was added to the patient's regimen and referral was made to acute rehab.  Neurosurgery felt that surgical intervention was not indicated given the small size of the berry aneurysm.  The patient remained in hospital receiving physical and occupational therapies.  He has been accepted to acute rehabilitation today and is stable for transfer.        Consults:   Neurology:  Impression  1. Acute right PCA stroke involving  right thalamus.and medial occipital with left homonymous hemianopsia with occlusion of the right PCA--P2 segment.   2. Polypoid lesion  left vocal cord  3. Hyperlipidemia with LDL of 133 and goal of < 70.   4. No hemodynamically significant intracranial nor vascular stenosis of neck vessels  5. No evidence of DM based on hemoglobin A1c of 5.7  6.  Right MCA trifurcation aneurysm  7. Cognitive impairment     Plan  1. CT angio ordered stat (tried to se patient yesterday and discussed his situation with Dr Collado. Did review CTA and MRI yesterday and reviewed them again today with the patient.   2. ENT evaluation of left vocal cord polypoid lesion  3. Inpatient rehab.   4. Neurosurgery consultation in regard to aneurysm   5. B12, folate, TSH.   6.  thiamine     I discussed the patients findings and my recommendations with patient, nursing staff and Dr. Tobias Lord MD    Neurosurgery:  Assessment/Plan:   Imaging demonstrates a 6 mm x 5 mm right MCA aneurysm.  This is unruptured.  This is an incidental finding.  ISU IA standards dictate aneurysms under 7 mm the risk of treatment exceeds the risk of conservative care.  I am to discuss this case and showed the pictures to our endovascular colleagues at the [facility] to see if they would recommend treating this aneurysm in the context of his ischemic right PCA CVA.  Otherwise I recommend maintaining blood pressures less than 160 systolic and I encouraged smoking cessation.  I discussed this at length with the patient.  He acknowledged understand this.  His questions and concerns were addressed.     I discussed the patients findings and my recommendations with patient     Esequiel Encinas MD        Pertinent Test Results:   Lab Results (last 7 days)     Procedure Component Value Units Date/Time    Vitamin B12 [[id number]]  (Normal) Collected:  08/09/19 0437    Specimen:  Blood Updated:  08/09/19 1854     Vitamin B-12 368 pg/mL     Folate [[id number]]  (Normal) Collected:  08/09/19 0437    Specimen:  Blood Updated:  08/09/19 1854     Folate 10.30 ng/mL     TSH [[id number]]  (Normal) Collected:  08/09/19 0437    Specimen:  Blood Updated:  08/09/19 1820     TSH 1.660 mIU/mL     POC Glucose Once [[id number]]  (Normal) Collected:  08/09/19 1200    Specimen:  Blood Updated:  08/09/19  1212     Glucose 95 mg/dL      Comment: : [id number] Omar LindsayMeter ID: [id number]       POC Glucose Once [[id number]]  (Normal) Collected:  08/09/19 0824    Specimen:  Blood Updated:  08/09/19 0840     Glucose 101 mg/dL      Comment: : [id number] Omar LindsayMeter ID: [id number]       Hemoglobin A1c [[id number]]  (Normal) Collected:  08/09/19 0437    Specimen:  Blood Updated:  08/09/19 0611     Hemoglobin A1C 5.7 %     Narrative:       Less than 6.0           Non-Diabetic Range  6.0-7.0                 ADA Therapeutic Target  Greater than 7.0        Action Suggested    Lipid Panel [[id number]]  (Abnormal) Collected:  08/09/19 0437    Specimen:  Blood Updated:  08/09/19 0554     Total Cholesterol 207 mg/dL      Triglycerides 161 mg/dL      HDL Cholesterol 38 mg/dL      LDL Cholesterol  133 mg/dL      LDL/HDL Ratio 3.60        Imaging Results (last 7 days)     Procedure Component Value Units Date/Time    CT Angiogram Head With & Without Contrast [[id number]] Collected:  08/08/19 1927     Updated:  08/08/19 1941    Narrative:       EXAMINATION: CT angiogram of the head with contrast dated 08/20/2019     DOSE: 504 mGycm. Automated exposure control was utilized to diminish  patient radiation dose..     HISTORY: Stroke symptoms     FINDINGS: Multiple contiguous axial images are obtained through the  [place] 1 mm intervals findings contrast ministration with  reformatted images obtained in the sagittal and coronal projections from  the original data set.     The right vertebral artery is dominant. There is mild plaquing with  associated stenosis of the proximal intracranial aspect of the right  vertebral artery. Both distal vertebral arteries are otherwise widely  patent to the level the basilar artery.     The basilar artery is normal in caliber. Both superior cerebellar  arteries as well as the left posterior cerebral artery are normal in  caliber and appearance. There is some attenuation within the  P2 segment  of the right posterior cerebral artery suggesting some underlying  steno-occlusive disease. This is in the same segment as the patient's  acute infarct.     The distal internal carotid arteries are widely patent. There is mild  calcific plaquing involving the cavernous and supraclinoid aspect of  both ICAs without evidence of rate limiting stenosis. There is a  hypoplastic A1 segment of the right anterior cerebral artery. A 6 mm  aneurysm is noted at the right MCA trifurcation. The anterior and middle  cerebral arteries are otherwise widely patent. No additional aneurysms  are identified.       Impression:       1.. Attenuation suggesting steno-occlusive disease of the P2 segment of  the right posterior cerebral artery. There is mild calcific plaquing  involving the proximal intracranial aspect of the right vertebral  artery. The posterior circulation is otherwise unremarkable.  2. 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 There is a hypoplastic A1 segment of the right anterior  cerebral artery. The anterior circulation is otherwise unremarkable.  3. The dural venous sinuses are unremarkable with no evidence of dural  venous sinus thrombosis.  This report was finalized on 08/08/2019 19:38 by Dr. Amado Escalona MD.    CT Angiogram Neck With & Without Contrast [[id number]] Collected:  08/08/19 1810     Updated:  08/08/19 1826    Narrative:       EXAMINATION: CT angiogram of the neck with contrast 08/08/2019     DOSE: 504 mGycm. Automated exposure control was utilized to diminish  patient radiation dose..     HISTORY: Stroke.     FINDINGS: Multiple contiguous axial and obtained from the aortic arch  through the skull base at 1 mm intervals following intravenous contrast  administration with reformatted images obtained in the sagittal and  coronal projections from the original dataset as well as MIPS.     The study is degraded by motion. There is a calcified granuloma  within  the right upper lobe. Lung apices are otherwise clear. The superior  mediastinum is unremarkable. There is mild tortuosity of the proximal  great vessels.     The thyroid gland is homogeneous in density without evidence of focal  nodule or mass. No evidence of adenopathy in the supraclavicular fossa.  The level of the true and false cords is remarkable for asymmetric  nodule which appears to be associated with the left vocal fold. This  measures approximately 5 mm in size and direct visualization is  recommended. There are some mildly prominent left posterior triangle  nodes including an 8 mm short axis node. Smaller nodes are noted within  the jugular chain bilaterally. The epiglottis and aryepiglottic folds  are unremarkable. The nasopharynx and parapharyngeal spaces are  unremarkable with no evidence of mass or mass effect.     There is some mild plaquing involving the innominate artery on the  right. The origin of the great vessels are otherwise unremarkable  including the vertebral arteries although again partially obscured  secondary to motion artifact. The right vertebral artery is dominant.     Both vertebral arteries are widely patent in their extracranial aspect.  There is calcific plaquing with mild associated stenosis of the proximal  intracranial segment of the right vertebral artery.     The right common carotid artery is widely patent to the level of the  carotid bifurcation. There is mild calcific plaquing involving the  carotid bulb and proximal aspect of the ICA. The more distal right ICA  is tortuous. There is very slight narrowing of the proximal segment of  the right ICA but with a less than 20% cross-sectional narrowing. The  more distal right ICA is widely patent.     The left common carotid artery is widely patent. There is mild calcific  plaquing involving the carotid bulb and proximal aspect of the ICA again  resulting in only a very mild stenosis with a less than  20%  cross-sectional narrowing of the lumen of the vessel. The more distal  left ICA is tortuous but otherwise widely patent.       Impression:       1. There is mild calcific plaquing and noncalcific plaque involving the  carotid bulb and proximal aspect of both internal carotid arteries. This  is not resulting in a hemodynamically significant stenosis with a less  than 20% cross-sectional narrowing noted of both ICAs. The more distal  ICAs are tortuous but otherwise widely patent.  2. Minimal calcific plaquing involving the proximal right innominate.  The origin of the great vessels are otherwise unremarkable.  3. Right vertebral artery is dominant. Both vertebral arteries are  widely patent in their extracranial aspect. There is some mild calcific  plaquing with mild associated stenosis of the proximal intracranial  aspect of the right vertebral artery.  4. There is a polypoid lesion which appears to be associated with the  left vocal fold. This would warrant follow-up with direct visualization  following ENT consultation.  This report was finalized on 08/08/2019 18:23 by Dr. Amado Escalona MD.    MRI Brain Without Contrast [[id number]] Collected:  08/08/19 1656     Updated:  08/08/19 1700    Narrative:       EXAM: MR BRAIN WITHOUT IV CONTRAST 08/08/2019     COMPARISON: None      HISTORY: 49 years-old Male. Stroke      TECHNIQUE:   Routine pulse sequences of the brain were obtained without IV contrast.      REPORT:     There is diffusion restriction within the right thalamus and medial to  the lobe, with associated T2/flair abnormal signal, consistent with  acute right PCA infarct. There is no hemorrhagic conversion.     No acute hydrocephalus. No midline shift. No suspicious extra fluid  collection. Mild chronic microvascular changes. Mild-to-moderate  cerebral volume loss.     Mastoid air cells are clear. The paranasal sinuses are free of  obstructive mucosal disease. The intraorbital structures  "are  unremarkable. The basal cisterns are preserved.                   Impression:       1.  Acute extensive infarct in the right PCA territory.  This report was finalized on 08/08/2019 16:57 by Dr. Gwen Conley MD.          Condition on Discharge:    Stable    Physical Exam on Discharge:  /80 (BP Location: Left arm, Patient Position: Lying)   Pulse 71   Temp 98.2 °F (36.8 °C) (Oral)   Resp 16   Ht 172 cm (67.72\")   Wt 105 kg (231 lb 7.7 oz)   SpO2 91%   BMI 35.49 kg/m²   Physical Exam  Constitutional: He is oriented to person, place, and time. He appears well-developed and well-nourished. He is cooperative. No distress.     HENT:   Head: Normocephalic and atraumatic.   Nose: Nose normal.   Mouth/Throat: Oropharynx is clear and moist. Facial asymmetry with weakness of the left facial muscles continues to improve slowly.    Eyes: Conjunctivae are normal.  No scleral icterus.   Neck: Normal range of motion. Neck supple. No JVD present.   Cardiovascular: Normal rate, regular rhythm, normal heart sounds and intact distal pulses.   No murmur heard.  Pulmonary/Chest: Effort normal. No respiratory distress.    Abdominal: Soft. Bowel sounds are normal. He exhibits no distension and no mass. There is no tenderness.   Musculoskeletal: He exhibits no edema.  Decreased range of motion and muscle tone on the left.  Neurological: He is alert and oriented to person, place, and time. He exhibits abnormal muscle tone (on left). Coordination (on left) abnormal.   Skin: Skin is warm and dry. No rash noted.   Psychiatric: He has a normal mood and affect.         Discharge Disposition:  Rehab Facility or Unit (DC - External)    Discharge Medications:     Discharge Medications      New Medications      Instructions Start Date   amLODIPine 10 MG tablet  Commonly known as:  NORVASC   10 mg, Oral, Every 24 Hours Scheduled   Start Date:  8/14/2019     aspirin 81 MG chewable tablet   81 mg, Oral, Daily   Start Date:  " 8/14/2019     atorvastatin 80 MG tablet  Commonly known as:  LIPITOR   80 mg, Oral, Nightly      nicotine 14 MG/24HR patch  Commonly known as:  NICODERM CQ   1 patch, Transdermal, Every 24 Hours      sennosides-docusate sodium 8.6-50 MG tablet  Commonly known as:  SENOKOT-S   2 tablets, Oral, 2 Times Daily      terazosin 5 MG capsule  Commonly known as:  HYTRIN   5 mg, Oral, Daily   Start Date:  8/14/2019            Discharge Diet:   Diet Instructions     Diet: Regular; Thin      Discharge Diet:  Regular    Fluid Consistency:  Thin          Discharge Care Plan / Instructions:   Discharge to [facility] rehab    Activity at Discharge:   Activity Instructions     Activity as Tolerated             Tay Collado DO  08/13/19  11:31 AM    Time: Discharge over 30 min    Please note that portions of this note may have been completed with a voice recognition program. Efforts were made to edit the dictations, but occasionally words are mistranscribed.

## 2019-08-13 NOTE — PLAN OF CARE
2: Pt will perform stand pvt TF w SBA  Short term goal 3: Pt will propel w/c 100' w supervision  Short term goal 4: Pt will ambulate 48' w RW w SBA  Short term goal 5: Pt will ascend/descend 2 steps w bilateral handrails w CGA            Long term goals  Time Frame for Long term goals : 2 Weeks  Long term goal 1: Pt will perform bed mobility independently   Long term goal 2: Pt will perform stand pvt TF w RW independently  Long term goal 3: Pt will propel w/c 150' independently   Long term goal 4: Pt will ambulate 150' w RW independently   Long term goal 5: Pt will ascend/descend 5 steps w bilateral handrails independently   These goals were reviewed with this patient at the time of assessment and [place] is in agreement. Plan of Care: Frequency:  [] 5 days per week, 90 minutes per day                             [x]  5 days per week, 60 minutes per day               Current Treatment Recommendations: Strengthening, Balance Training, Functional Mobility Training, Gait Training, Stair training, Transfer Training, Safety Education & Training      OCCUPATIONAL THERAPY:  Goals:             Short term goals  Time Frame for Short term goals: 1 week   Short term goal 1: Supervision with bathing hygiene. Short term goal 2: Supervision with LB dressing. Short term goal 3: Supervision with clothing management/hygiene for toileting. Short term goal 4: Supervision with toilet transfers. Short term goal 5: Supervision with ambulatory home making task. Short term goal 6: Supervision with one-two handed static standing task for 4 minutes.  :  Long term goals  Time Frame for Long term goals : 2-3 weeks   Long term goal 1: Modified independent with bathing hygiene. Long term goal 2: Modified independent with overall dressing. Long term goal 3: Modified independent with toileting and toilet transfers. Long term goal 4: Independent with HEP.    Long term goal 5: Patient verbalize DME needs.  :    These goals Garima Ureña will be seen a minimum of 3 hours of therapy per day/a minimum of 5 out of 7 days per week. [] In this rare instance due to the nature of this patient's medical involvement, this patient will be seen 15 hours per week (900 minutes within a 7 day period). Treatments may include therapeutic exercises, gait training, neuromuscular re-ed, transfer training, community reintegration, bed mobility, w/c mobility and training, self care, home mgmt, cognitive training, energy conservation,dysphagia tx, speech/language/communication therapy, group therapy, and patient/family education. In addition, dietician/nutritionist may monitor calorie count as well as intake and collaboratively work with SLP on dietary upgrades. Neuropsychology/Psychology may evaluate and provide necessary support. Medical issues being managed closely and that require 24 hour availability of a physician:   [] Swallowing Precautions  [] Bowel/Bladder Fx  [] Weight bearing precautions   [] Wound Care    [] Pain Mgmt   [x] Infection Protection   [x] DVT Prophylaxis   [x] Fall Precautions  [] Fluid/Electrolyte/Nutrition Balance   [] Voice Protection   [] Respiratory  [] Other:    Medical Prognosis: [x] Good  [] Fair    [] Guarded   Total expected IRF days:  16  Anticipated discharge destination:    [] Home Independently   [x] Home with supervision    []SNF     [] Other                                           Physician anticipated functional outcomes:  Ambulate household distances independently with assistive device. IPOC brief synthesis: Acute inpatient rehabilitation with occupational   physical therapy and speech therapy, 180 minutes, 5 every 7   days will address basic and advancing mobility with self-care   instruction and adaptive equipment training. Caregiver education will   be offered. Expected length of stay prior to the supervised level of   functional discharge to home with home care is 16 days.     Assessment and

## 2019-08-13 NOTE — PROGRESS NOTES
"Olman Vaca  49 y.o.      Chief complaint:   No complaints    Subjective:  Symptoms:  Stable.    Diet:  Adequate intake.    Activity level: Impaired due to weakness.    Pain:  He reports no pain.      No events    Temp:  [97.5 °F (36.4 °C)-98.2 °F (36.8 °C)] 97.5 °F (36.4 °C)  Heart Rate:  [55-72] 62  Resp:  [8-18] 16  BP: (149-170)/(82-99) 151/84      Objective:  General Appearance:  Comfortable, well-appearing, in no acute distress and not in pain.    Vital signs: (most recent): Blood pressure 151/84, pulse 62, temperature 97.5 °F (36.4 °C), temperature source Oral, resp. rate 16, height 172 cm (67.72\"), weight 105 kg (231 lb 7.7 oz), SpO2 95 %.  Vital signs are normal.  No fever.    Output: Producing urine.    HEENT: Normal HEENT exam.    Lungs:  Normal effort and normal respiratory rate.  He is not in respiratory distress.    Heart: Normal rate.  Regular rhythm.    Chest: Symmetric chest wall expansion.   Extremities: Normal range of motion.    Skin:  Warm and dry.    Abdomen: Abdomen is soft and non-distended.  Bowel sounds are normal.   There is no abdominal tenderness.     Pulses: Distal pulses are intact.        Neurologic Exam     Mental Status   Oriented to person, place, and time.   Attention: normal. Concentration: normal.   Speech: speech is normal   Level of consciousness: alert  Knowledge: good.   Normal comprehension.     Cranial Nerves   Cranial nerves II through XII intact.     Motor Exam   Muscle bulk: normalLeft hemiparesis     Sensory Exam   Light touch normal.         CVA (cerebral vascular accident) (CMS/HCC)    Left hemiparesis (CMS/HCC)    Benign essential HTN    Medical non-compliance    HLD (hyperlipidemia)    Alcohol use disorder, moderate, dependence (CMS/HCC)    Vocal cord polyp    Berry aneurysm      Lab Results (last 24 hours)     ** No results found for the last 24 hours. **              Plan:   CV: Heart rate and blood pressure stable  Respiratory: Oxygen level stable  GI: " Tolerating p.o.   adequate urine output  Neuro: Exam stable  Continue with physical and occupational therapy.  Discharge planning waiting on acute rehab.  Dr. Encinas is waiting to talk to neurosurgeon at [facility] regarding aneurysm today for further recommendations and follow-up        Donald Biggs, APRN

## 2019-08-13 NOTE — PLAN OF CARE
Problem: Patient Care Overview  Goal: Plan of Care Review  Outcome: Ongoing (interventions implemented as appropriate)   08/13/19 0334   Coping/Psychosocial   Plan of Care Reviewed With patient   Plan of Care Review   Progress no change   OTHER   Outcome Summary VSS, except bp elevated at times. No change in neuro status, NIH-4. CIWA 8, medicated x 1 then down to 1. Pt did have an episode of nausea w/emeisis x 1, w/a headache. Pt is having difficulty w/incontinence and urgency of urine. SCD in place for part of the shift. Safety maintained.       Problem: Pain, Chronic (Adult)  Goal: Acceptable Pain/Comfort Level and Functional Ability  Outcome: Ongoing (interventions implemented as appropriate)

## 2019-08-13 NOTE — THERAPY DISCHARGE NOTE
Acute Care - Physical Therapy Discharge Summary  [facility]       Patient Name: Olman Vaca  : 1970  MRN: [id number]    Today's Date: 2019  Onset of Illness/Injury or Date of Surgery: 19    Date of Referral to PT: 19  Referring Physician: Dr. Collado      Admit Date: 2019      PT Recommendation and Plan    Visit Dx:    ICD-10-CM ICD-9-CM   1. Cognitive and behavioral changes R41.89 799.59    R46.89 312.9   2. Impaired functional mobility, balance, gait, and endurance Z74.09 V49.89   3. Impaired mobility and ADLs Z74.09 799.89       Outcome Measures     Row Name 19 1300 19 1400          How much help from another person do you currently need...    Turning from your back to your side while in flat bed without using bedrails?  --  3  -AH     Moving from lying on back to sitting on the side of a flat bed without bedrails?  --  3  -AH     Moving to and from a bed to a chair (including a wheelchair)?  --  3  -AH     Standing up from a chair using your arms (e.g., wheelchair, bedside chair)?  --  3  -AH     Climbing 3-5 steps with a railing?  --  2  -AH     To walk in hospital room?  --  3  -AH     AM-PAC 6 Clicks Score (PT)  --  17  -AH        How much help from another is currently needed...    Putting on and taking off regular lower body clothing?  3  -TS  --     Bathing (including washing, rinsing, and drying)  3  -TS  --     Toileting (which includes using toilet bed pan or urinal)  3  -TS  --     Putting on and taking off regular upper body clothing  3  -TS  --     Taking care of personal grooming (such as brushing teeth)  3  -TS  --     Eating meals  4  -TS  --     AM-PAC 6 Clicks Score (OT)  19  -TS  --        Functional Assessment    Outcome Measure Options  AM-PAC 6 Clicks Daily Activity (OT)  -TS  AM-PAC 6 Clicks Basic Mobility (PT)  -       User Key  (r) = Recorded By, (t) = Taken By, (c) = Cosigned By    Initials Name Provider Type    Mita Nguyen, PTA  Physical Therapy Assistant    Mi Bridges COTA/L Occupational Therapy Assistant              Rehab Goal Summary     Row Name 08/13/19 3888             Physical Therapy Goals    Bed Mobility Goal Selection (PT)  bed mobility, PT goal 1  -NW      Transfer Goal Selection (PT)  transfer, PT goal 1  -NW      Gait Training Goal Selection (PT)  gait training, PT goal 1  -NW      Balance Goal Selection (PT)  balance, PT goal 1  -NW         Bed Mobility Goal 1 (PT)    Activity/Assistive Device (Bed Mobility Goal 1, PT)  sit to supine;supine to sit  -NW      [place] Level/Cues Needed (Bed Mobility Goal 1, PT)  conditional independence  -NW      Time Frame (Bed Mobility Goal 1, PT)  long term goal (LTG);by discharge  -NW      Progress/Outcomes (Bed Mobility Goal 1, PT)  goal not met  -NW         Transfer Goal 1 (PT)    Activity/Assistive Device (Transfer Goal 1, PT)  sit-to-stand/stand-to-sit;bed-to-chair/chair-to-bed;walker, rolling  -NW      [place] Level/Cues Needed (Transfer Goal 1, PT)  supervision required  -NW      Time Frame (Transfer Goal 1, PT)  long term goal (LTG);by discharge  -NW      Progress/Outcome (Transfer Goal 1, PT)  goal not met  -NW         Gait Training Goal 1 (PT)    Activity/Assistive Device (Gait Training Goal 1, PT)  gait (walking locomotion);assistive device use;backward stepping;decrease fall risk;forward stepping;improve balance and speed;increase endurance/gait distance;normalize weight shifts;sidestepping;turning, left;turning, right;walker, rolling  -NW      [place] Level (Gait Training Goal 1, PT)  minimum assist (75% or more patient effort);1 person assist  -NW      Distance (Gait Goal 1, PT)  Pt will ambulate 50ft on even surfaces with improve motor planning of L LE.   -NW      Time Frame (Gait Training Goal 1, PT)  long term goal (LTG);by discharge  -NW      Progress/Outcome (Gait Training Goal 1, PT)  goal not met  -NW         Balance Goal 1 (PT)     Activity/Assistive Device (Balance Goal 1, PT)  sitting, dynamic;standing, static;standing, dynamic;walker, rolling  -NW      [place] Level/Cues Needed (Balance Goal 1, PT)  contact guard assist  -NW      Time Frame (Balance Goal 1, PT)  long term goal (LTG);by discharge  -NW      Progress/Outcomes (Balance Goal 1, PT)  goal not met  -NW        User Key  (r) = Recorded By, (t) = Taken By, (c) = Cosigned By    Initials Name Provider Type Discipline    Janae Carpenter PTA Physical Therapy Assistant PT              PT Discharge Summary  Anticipated Discharge Disposition (PT): inpatient rehabilitation facility  Reason for Discharge: Discharge from facility  Outcomes Achieved: Refer to plan of care for updates on goals achieved  Discharge Destination: Inpatient rehabilitation facility      Janae Aguilar PTA   8/13/2019

## 2019-08-13 NOTE — PLAN OF CARE
Problem: Patient Care Overview  Goal: Plan of Care Review  Outcome: Outcome(s) achieved Date Met: 08/13/19 08/13/19 2889   Coping/Psychosocial   Plan of Care Reviewed With patient   Plan of Care Review   Progress improving   OTHER   Outcome Summary PT is getting DC to [facility] rehab. No pain. NIH- CIWA-0. PT states numbess in upper and lower extremeties as well as the side of his torso. The strength on his left side is moderately impaired. Needs to be reminded to hold onto the walker with his left hand and to take his time walking. (will cross leg over the other when walking. control on the left side is weaker.)        Problem: Pain, Chronic (Adult)  Goal: Acceptable Pain/Comfort Level and Functional Ability  Outcome: Outcome(s) achieved Date Met: 08/13/19      Problem: Skin Injury Risk (Adult)  Goal: Identify Related Risk Factors and Signs and Symptoms  Outcome: Outcome(s) achieved Date Met: 08/13/19    Goal: Skin Health and Integrity  Outcome: Outcome(s) achieved Date Met: 08/13/19      Problem: Fall Risk (Adult)  Goal: Identify Related Risk Factors and Signs and Symptoms  Outcome: Outcome(s) achieved Date Met: 08/13/19    Goal: Absence of Fall  Outcome: Outcome(s) achieved Date Met: 08/13/19

## 2019-08-13 NOTE — PROGRESS NOTES
Continued Stay Note  [facility]     Patient Name: Olman Vaca  MRN: [id number]  Today's Date: 8/13/2019    Admit Date: 8/8/2019    Discharge Plan     Row Name 08/13/19 1344       Plan    Final Discharge Disposition Code  62 - inpatient rehab facility    Final Note  PRECERT IS COMPLETE FOR [facility]. PT IS BEING DCD TO [facility] TODAY. CALL REPORT -[phone]        Discharge Codes    No documentation.       Expected Discharge Date and Time     Expected Discharge Date Expected Discharge Time    Aug 13, 2019             XANDER Loo

## 2019-08-14 PROCEDURE — 97116 GAIT TRAINING THERAPY: CPT

## 2019-08-14 PROCEDURE — 1180000000 HC REHAB R&B

## 2019-08-14 PROCEDURE — 97165 OT EVAL LOW COMPLEX 30 MIN: CPT

## 2019-08-14 PROCEDURE — 97535 SELF CARE MNGMENT TRAINING: CPT

## 2019-08-14 PROCEDURE — 97110 THERAPEUTIC EXERCISES: CPT

## 2019-08-14 PROCEDURE — 99223 1ST HOSP IP/OBS HIGH 75: CPT | Performed by: PSYCHIATRY & NEUROLOGY

## 2019-08-14 PROCEDURE — 6370000000 HC RX 637 (ALT 250 FOR IP): Performed by: PSYCHIATRY & NEUROLOGY

## 2019-08-14 PROCEDURE — 97161 PT EVAL LOW COMPLEX 20 MIN: CPT

## 2019-08-14 PROCEDURE — 92522 EVALUATE SPEECH PRODUCTION: CPT

## 2019-08-14 RX ORDER — ALPRAZOLAM 0.5 MG/1
0.5 TABLET ORAL 3 TIMES DAILY PRN
Status: DISCONTINUED | OUTPATIENT
Start: 2019-08-14 | End: 2019-08-21

## 2019-08-14 RX ADMIN — ATORVASTATIN CALCIUM 80 MG: 80 TABLET, FILM COATED ORAL at 19:51

## 2019-08-14 RX ADMIN — ALPRAZOLAM 0.5 MG: 0.5 TABLET ORAL at 19:51

## 2019-08-14 RX ADMIN — PANTOPRAZOLE SODIUM 40 MG: 40 TABLET, DELAYED RELEASE ORAL at 19:51

## 2019-08-14 RX ADMIN — DOXAZOSIN 4 MG: 4 TABLET ORAL at 08:13

## 2019-08-14 RX ADMIN — POLYETHYLENE GLYCOL 3350 17 G: 17 POWDER, FOR SOLUTION ORAL at 08:10

## 2019-08-14 RX ADMIN — SENNOSIDES AND DOCUSATE SODIUM 2 TABLET: 8.6; 5 TABLET ORAL at 08:10

## 2019-08-14 RX ADMIN — AMLODIPINE BESYLATE 10 MG: 10 TABLET ORAL at 08:13

## 2019-08-14 RX ADMIN — DOCUSATE SODIUM 100 MG: 100 CAPSULE, LIQUID FILLED ORAL at 08:10

## 2019-08-14 RX ADMIN — PANTOPRAZOLE SODIUM 40 MG: 40 TABLET, DELAYED RELEASE ORAL at 08:10

## 2019-08-14 RX ADMIN — ASPIRIN 81 MG 81 MG: 81 TABLET ORAL at 08:10

## 2019-08-14 NOTE — PROGRESS NOTES
foot placement, difficulty L  RW, reciprocal gait pattern    Gait Deviations: Slow Noemi, Decreased step height, Deviated path  Distance: 25'  Comments: Prior to instruction being provided   AMBULATION 2  Ambulation 2  Surface - 2: level tile  Device 2: Rolling Walker  Other Apparatus 2: Wheelchair follow  Assistance 2: Contact guard assistance  Quality of Gait 2: Narrow ALEX, verbal and visual cues for L foot placement  Gait Deviations: Slow Noemi, Decreased step length, Decreased step height  Distance: 25'  Comments: Pt attempted step to gait advancing w LLE, difficulty to perform w/o constant cueing   STAIRS   Unable to Assess   FIM SCORES    CURRENT  Bed, Chair, Wheel Chair: 4 - Requires steadying assistance only <25% assist  and/or requires assist with one leg only  Walk: 1 - Total Assistance Walks < 50 feet OR requires two or more people OR patient performs < 25% of locomotion effort  Wheel Chair: 2 - Maximal Assistance Requires up to Maximal Assistance AND requires assistance of one person to operate wheelchair between  feet  Stairs: 0 - Activity Does not Occur ( 0 only for the admission assessment)  ADMIT  Bed, Chair, Wheel Chair: 4 - Requires steadying assistance only <25% assist  and/or requires assist with one leg only   Walk: 1 - Total Assistance Walks < 50 feet OR requires two or more people OR patient performs < 25% of locomotion effort   Wheel Chair: 2 - Maximal Assistance Requires up to Maximal Assistance AND requires assistance of one person to operate wheelchair between  feet   Stairs: 0 - Activity Does not Occur ( 0 only for the admission assessment)      GOALS  GOAL: Bed, Chair, Wheel Chair: 6  GOAL: Walk/Wheel Chair: 6  GOAL: Stairs: 2     GOALS:  Short term goals  Time Frame for Short term goals: 1 Week   Short term goal 1: Pt will perform bed mobility w supervision   Short term goal 2: Pt will perform stand pvt TF w SBA  Short term goal 3: Pt will propel w/c 100' w ADAPTATION TO IMPAIRMENTS.    PATIENT GOAL FOR REHAB:  RETURN TO PRIOR LEVEL OF FUNCTION       IRF/JOÃO  Roll Left and Right  Assistance Needed: Supervision or touching assistance  CARE Score: 4  Discharge Goal: Independent    Sit to Lying  Assistance Needed: Supervision or touching assistance  CARE Score: 4  Discharge Goal: Independent    Lying to Sitting on Side of Bed  Assistance Needed: Supervision or touching assistance  CARE Score: 4  Discharge Goal: Independent    Sit to Stand  Assistance Needed: Supervision or touching assistance  CARE Score: 4  Discharge Goal: Independent    Chair/Bed-to-Chair Transfer  Assistance Needed: Supervision or touching assistance  CARE Score: 4  Discharge Goal: Independent    Car Transfer  Reason if not Attempted: Not attempted due to medical condition or safety concerns  CARE Score: 88  Discharge Goal: Independent    Walk 10 Feet  Assistance Needed: Supervision or touching assistance  CARE Score: 4  Discharge Goal: Independent    Walk 50 Feet with Two Turns  Reason if not Attempted: Not attempted due to medical condition or safety concerns  CARE Score: 88  Discharge Goal: Independent    Walk 150 Feet  Reason if not Attempted: Not attempted due to medical condition or safety concerns  CARE Score: 88  Discharge Goal: Independent    Walking 10 Feet on Uneven Surfaces  Reason if not Attempted: Not attempted due to medical condition or safety concerns  CARE Score: 88  Discharge Goal: Supervision or touching assistance    1 Step (Curb)  Reason if not Attempted: Not attempted due to medical condition or safety concerns  CARE Score: 88  Discharge Goal: Independent    4 Steps  Reason if not Attempted: Not attempted due to medical condition or safety concerns  CARE Score: 88  Discharge Goal: Independent    12 Steps  Reason if not Attempted: Not attempted due to medical condition or safety concerns  CARE Score: 88  Discharge Goal: Dependent    Wheel 50 Feet with Two Turns  Assistance Needed:

## 2019-08-14 NOTE — PROGRESS NOTES
Skin Integrity, Weight, Pertinent Labs, Chewing/Swallowing      Electronically signed by Moraima Jimenez, MS, RD, LD on 8/14/19 at 10:05 AM    Contact Number: [phone]

## 2019-08-14 NOTE — PATIENT CARE CONFERENCE
noted    Barriers to the achievement of predicted outcomes: poor health maintenance previously    Team Members Present at Conference:  : Leif Valerio   Occupational Therapist: Jeffry Carlson, OTR/L  Physical Therapist: Joel Jeffries PT  Speech Therapist: Zurdo Ferraro [address], [address] [facility]  Nurse: Stefano Mosher RN  Nurse Manager:  Jerica Xiong, RN, BSN  Dietitian:  Crispin Crane, MS RD, LD  Rehab Director:  Amrit Blackman approve the established interdisciplinary plan of care as documented within the medical record of Jacandreia Kieran.

## 2019-08-14 NOTE — H&P
[facility]   History and Physical        Patient:   Jenn Parikh  MR#:    [id number]  Account Number:                   [id number]      Room:    [facility]-   YOB: 1970  Date of Progress Note: 8/14/2019  Time of Note                           7:18 AM  Attending Physician:  Antonia Alvarez MD        Admitting diagnosis PCA infarct    Secondary diagnoses: Smoker, hyperlipidemia, essential hypertension, BPH    CHIEF COMPLAINT: Left-sided weakness with cognitive difficulties      HISTORY OF PRESENT ILLNESS:   This 44 y.o. male  transferred from [facility] to [address] on 8/8/19 after feeling dizz and developing weakness and numbness in his L side. MRI showed Acute extensive infarct in the right PCA territory. 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 Neurosurgery was consulted. Dr. Maggie Hernández was discussing imaging from aneursym w/colleagues at [facility] for recommendations for treatment. Speech eval revealed a Left visual field cut as well as some mild cognitive deficits. He is tolerating a reg diet w/thin liquids. He is now medically stable and is participating w/therapy. He is ready to begin rehab w/plan of going home w/his sister at discharge from rehab    REVIEW OF SYSTEMS:  Constitutional - No fever or chills. No diaphoresis or significant fatigue. HENT -  No tinnitus or significant hearing loss. Eyes - no sudden vision change or eye pain  Respiratory - no significant shortness of breath or cough  Cardiovascular - no chest pain No palpitations or significant leg swelling  Gastrointestinal - no abdominal swelling or pain. Genitourinary - No difficulty urinating, dysuria  Musculoskeletal - no back pain or myalgia. Skin - no color change or rash  Neurologic - No seizures. No lateralizing weakness. Hematologic - no easy bruising or excessive bleeding. Psychiatric - no severe anxiety or nervousness. All other review of systems are negative.       Past Medical History:      Diagnosis Date    Alcohol dependence, uncomplicated ([facility] [address]     Ataxia following cerebral infarction     Cerebral aneurysm, nonruptured     Hyperlipidemia     Hypertension     Nicotine dependence     Psychiatric problem        Past Surgical History:      Procedure Laterality Date    LYMPH NODE BIOPSY      MOUTH SURGERY         Medications in Hospital:      Current Facility-Administered Medications:     amLODIPine (NORVASC) tablet 10 mg, 10 mg, Oral, Daily, Manda Melton MD    aspirin chewable tablet 81 mg, 81 mg, Oral, Daily, Manda Melton MD    atorvastatin (LIPITOR) tablet 80 mg, 80 mg, Oral, Nightly, Manda Melton MD, 80 mg at 08/13/19 2010    nicotine (NICODERM CQ) 14 MG/24HR 1 patch, 1 patch, Transdermal, Q24H, Manda Melton MD, 1 patch at 08/13/19 1846    sennosides-docusate sodium (SENOKOT-S) 8.6-50 MG tablet 2 tablet, 2 tablet, Oral, BID, Manda Melton MD    doxazosin (CARDURA) tablet 4 mg, 4 mg, Oral, Daily, Manda Melton MD    acetaminophen (TYLENOL) tablet 650 mg, 650 mg, Oral, Q4H PRN, Manda Melton MD    docusate sodium (COLACE) capsule 100 mg, 100 mg, Oral, BID, Manda Melton MD    polyethylene glycol [facility]) packet 17 g, 17 g, Oral, Daily, Manda Melton MD    bisacodyl (DULCOLAX) suppository 10 mg, 10 mg, Rectal, Daily PRN, Manda Melton MD    calcium carbonate (TUMS) chewable tablet 500 mg, 500 mg, Oral, TID PRN, Manda Melton MD, 500 mg at 08/13/19 2010    pantoprazole (PROTONIX) tablet 40 mg, 40 mg, Oral, BID, Manda Melton MD, 40 mg at 08/13/19 2155    aluminum & magnesium hydroxide-simethicone (MAALOX) 200-200-20 MG/5ML suspension 30 mL, 30 mL, Oral, Q4H PRN, Manda Melton MD, 30 mL at 08/13/19 2155    Allergies:  Patient has no known allergies. Social History:   TOBACCO:   reports that he has been smoking. He started smoking about 41 years ago. He has a 40.00 pack-year smoking history. He has never used smokeless tobacco.  ETOH:   reports that he drinks alcohol.     Family History:

## 2019-08-14 NOTE — THERAPY DISCHARGE NOTE
Acute Care - Occupational Therapy Discharge Summary  [facility]     Patient Name: Olman Vaca  : 1970  MRN: [id number]    Today's Date: 2019  Onset of Illness/Injury or Date of Surgery: 19    Date of Referral to OT: 19  Referring Physician: Dr. Collado      Admit Date: 2019        OT Recommendation and Plan    Visit Dx:    ICD-10-CM ICD-9-CM   1. Cognitive and behavioral changes R41.89 799.59    R46.89 312.9   2. Impaired functional mobility, balance, gait, and endurance Z74.09 V49.89   3. Impaired mobility and ADLs Z74.09 799.89               Rehab Goal Summary     Row Name 19 0700             Transfer Goal 1 (OT)    Activity/Assistive Device (Transfer Goal 1, OT)  sit-to-stand/stand-to-sit;bed-to-chair/chair-to-bed;toilet  -TS      [place] Level/Cues Needed (Transfer Goal 1, OT)  contact guard assist  -TS      Time Frame (Transfer Goal 1, OT)  long term goal (LTG);by discharge  -TS      Progress/Outcome (Transfer Goal 1, OT)  goal met  -TS         Balance Goal 1 (OT)    Activity/Assistive Device (Balance Goal 1, OT)  sitting, dynamic;standing, static;standing, dynamic  -TS      [place] Level/Cues Needed (Balance Goal 1, OT)  contact guard assist  -TS      Time Frame (Balance Goal 1, OT)  long term goal (LTG);by discharge  -TS      Progress/Outcomes (Balance Goal 1, OT)  goal not met  -TS         Coordination Goal 1 (OT)    Activity/Assistive Device (Coordination Goal 1, OT)  FM task;GM task;FM written ex program;GM written ex program  -TS      [place] Level/Cues Needed (Coordination Goal 1, OT)  standby assist  -TS      Time Frame (Coordination Goal 1, OT)  long term goal (LTG);by discharge  -TS      Progress/Outcomes (Coordination Goal 1, OT)  goal not met  -TS        User Key  (r) = Recorded By, (t) = Taken By, (c) = Cosigned By    Initials Name Provider Type Discipline    TS Mi Wallace, AGUSTIN/L Occupational Therapy Assistant OT          Outcome  Measures     Row Name 08/12/19 1300 08/11/19 1400          How much help from another person do you currently need...    Turning from your back to your side while in flat bed without using bedrails?  --  3  -AH     Moving from lying on back to sitting on the side of a flat bed without bedrails?  --  3  -AH     Moving to and from a bed to a chair (including a wheelchair)?  --  3  -AH     Standing up from a chair using your arms (e.g., wheelchair, bedside chair)?  --  3  -AH     Climbing 3-5 steps with a railing?  --  2  -AH     To walk in hospital room?  --  3  -AH     AM-PAC 6 Clicks Score (PT)  --  17  -AH        How much help from another is currently needed...    Putting on and taking off regular lower body clothing?  3  -TS  --     Bathing (including washing, rinsing, and drying)  3  -TS  --     Toileting (which includes using toilet bed pan or urinal)  3  -TS  --     Putting on and taking off regular upper body clothing  3  -TS  --     Taking care of personal grooming (such as brushing teeth)  3  -TS  --     Eating meals  4  -TS  --     AM-PAC 6 Clicks Score (OT)  19  -TS  --        Functional Assessment    Outcome Measure Options  AM-PAC 6 Clicks Daily Activity (OT)  -TS  AM-PAC 6 Clicks Basic Mobility (PT)  -       User Key  (r) = Recorded By, (t) = Taken By, (c) = Cosigned By    Initials Name Provider Type     Mita Montes, PTA Physical Therapy Assistant    TS Mi Wallace, AGUSTIN/L Occupational Therapy Assistant          Therapy Suggested Charges     Code   Minutes Charges    65465 (CPT®) Hc Ot Neuromusc Re Education Ea 15 Min      55748 (CPT®) Hc Ot Ther Proc Ea 15 Min      08891 (CPT®) Hc Ot Therapeutic Act Ea 15 Min      75608 (CPT®) Hc Ot Manual Therapy Ea 15 Min      67595 (CPT®) Hc Ot Iontophoresis Ea 15 Min      21688 (CPT®) Hc Ot Elec Stim Ea-Per 15 Min      41729 (CPT®) Hc Ot Ultrasound Ea 15 Min      75901 (CPT®) Hc Ot Self Care/Mgmt/Train Ea 15 Min 42 3    Total  42 3              OT  Discharge Summary  Reason for Discharge: Discharge from facility  Outcomes Achieved: Refer to plan of care for updates on goals achieved  Discharge Destination: Inpatient rehabilitation facility      OMA La  8/14/2019

## 2019-08-14 NOTE — THERAPY DISCHARGE NOTE
Acute Care - Speech Language Pathology Discharge Summary  [facility]       Patient Name: Olman Vaca  : 1970  MRN: [id number]    Today's Date: 2019  Onset of Illness/Injury or Date of Surgery: 19       Referring Physician: Dr. Collado        Admit Date: 2019      SLP Recommendation and Plan    Visit Dx:    ICD-10-CM ICD-9-CM   1. Cognitive and behavioral changes R41.89 799.59    R46.89 312.9   2. Impaired functional mobility, balance, gait, and endurance Z74.09 V49.89   3. Impaired mobility and ADLs Z74.09 799.89               SLP GOALS     Row Name 19 1500             Functional Math Skills Goal 1 (SLP)    Improve Functional Math Skills Through Goal 1 (SLP)  complete word problems involving time;complete word problems involving money;90%;independently (over 90% accuracy)  -MB      Time Frame (Functional Math Skills Goal 1, SLP)  by discharge  -MB      Barriers (Functional Math Skills Goal 1, SLP)  n/a  -MB      Progress/Outcomes (Functional Math Skills Goal 1, SLP)  goal not met  -MB         Right Hemisphere Function Goal 1 (SLP)    Improve Right Hemisphere Function Through Goal 1 (SLP)  use compensatory strategies for left neglect;answer questions regarding situational information;90%;independently (over 90% accuracy)  -MB      Time Frame (Right Hemisphere Function Goal 1, SLP)  by discharge  -MB      Barriers (Right Hemisphere Function Goal 1, SLP)  Visual deficit  -MB      Progress/Outcomes (Right Hemisphere Function Goal 1, SLP)  goal not met  -MB        User Key  (r) = Recorded By, (t) = Taken By, (c) = Cosigned By    Initials Name Provider Type    Nery Hester CCC-SLP Speech and Language Pathologist                  SLP Discharge Summary  Anticipated Dischage Disposition: home  Reason for Discharge: discharge from this facility  Progress Toward Achieving Short/long Term Goals: goals not met within established timelines  Discharge Destination: [facility]  JONHNY Diop-SLP  8/14/2019

## 2019-08-14 NOTE — PAYOR COMM NOTE
"DC HOME 8-13-19  [id number]  NEED TOTAL 5 DAYS APPROVED  UR  [phone]    Star Vaca (49 y.o. Male)     Date of Birth Social Security Number Address Home Phone MRN    1970  [address] [facility] [address]  [id number]    Gnosticist Marital Status          [facility] Single       Admission Date Admission Type Admitting Provider Attending Provider Department, Room/Bed    8/8/19 Urgent Tay Collado [facility] 3A, 354/1    Discharge Date Discharge Disposition Discharge Destination        8/13/2019 Rehab Facility or Unit (DC - External)              Attending Provider:  (none)   Allergies:  No Known Allergies    Isolation:  None   Infection:  None   Code Status:  Prior    Ht:  172 cm (67.72\")   Wt:  105 kg (231 lb 7.7 oz)    Admission Cmt:  None   Principal Problem:  CVA (cerebral vascular accident) (CMS/[facility]) [I63.9]                 Active Insurance as of 8/8/2019     Primary Coverage     Payor Plan Insurance Group Employer/Plan Group    [employer] MEDICAID [employer] [id number]     Payor Plan Address Payor Plan Phone Number Payor Plan Fax Number Effective Dates    PO  [phone]  8/8/2019 - None Entered    [facility] [id number]       Subscriber Name Subscriber Birth Date Member ID       STAR VACA 1970 [id number]                 Emergency Contacts      (Rel.) Home Phone Work Phone Mobile Phone    Natasha Ocampo (Sister) -- -- [phone]    SHARIAT VACA (Father) -- -- [phone]               Physician Progress Notes (last 48 hours) (Notes from 8/12/2019  7:18 AM through 8/14/2019  7:18 AM)      Donald Biggs APRN at 8/13/2019  7:17 AM          Star GREGORY Willettjonatan  49 y.o.      Chief complaint:   No complaints    Subjective:  Symptoms:  Stable.    Diet:  Adequate intake.    Activity level: Impaired due to weakness.    Pain:  He reports no pain.      No events    Temp:  [97.5 °F (36.4 °C)-98.2 °F (36.8 °C)] 97.5 °F (36.4 °C)  Heart " "Rate:  [55-72] 62  Resp:  [8-18] 16  BP: (149-170)/(82-99) 151/84      Objective:  General Appearance:  Comfortable, well-appearing, in no acute distress and not in pain.    Vital signs: (most recent): Blood pressure 151/84, pulse 62, temperature 97.5 °F (36.4 °C), temperature source Oral, resp. rate 16, height 172 cm (67.72\"), weight 105 kg (231 lb 7.7 oz), SpO2 95 %.  Vital signs are normal.  No fever.    Output: Producing urine.    HEENT: Normal HEENT exam.    Lungs:  Normal effort and normal respiratory rate.  He is not in respiratory distress.    Heart: Normal rate.  Regular rhythm.    Chest: Symmetric chest wall expansion.   Extremities: Normal range of motion.    Skin:  Warm and dry.    Abdomen: Abdomen is soft and non-distended.  Bowel sounds are normal.   There is no abdominal tenderness.     Pulses: Distal pulses are intact.        Neurologic Exam     Mental Status   Oriented to person, place, and time.   Attention: normal. Concentration: normal.   Speech: speech is normal   Level of consciousness: alert  Knowledge: good.   Normal comprehension.     Cranial Nerves   Cranial nerves II through XII intact.     Motor Exam   Muscle bulk: normalLeft hemiparesis     Sensory Exam   Light touch normal.         CVA (cerebral vascular accident) (CMS/HCC)    Left hemiparesis (CMS/HCC)    Benign essential HTN    Medical non-compliance    HLD (hyperlipidemia)    Alcohol use disorder, moderate, dependence (CMS/HCC)    Vocal cord polyp    Berry aneurysm      Lab Results (last 24 hours)     ** No results found for the last 24 hours. **              Plan:   CV: Heart rate and blood pressure stable  Respiratory: Oxygen level stable  GI: Tolerating p.o.   adequate urine output  Neuro: Exam stable  Continue with physical and occupational therapy.  Discharge planning waiting on acute rehab.  Dr. Encinas is waiting to talk to neurosurgeon at [facility] regarding aneurysm today for further recommendations and " follow-up        DAREK Dumont      Electronically signed by Donald Biggs APRN at 8/13/2019  7:18 AM     Tay Collado DO at 8/12/2019  3:53 PM              [facility] Medicine Services  INPATIENT PROGRESS NOTE    Length of Stay: 4  Date of Admission: 8/8/2019  Primary Care Physician: Gordon Robles MD    Subjective     Chief Complaint:     Left-sided weakness    HPI     Patient has no new complaints.  He continues to work fully with physical therapy and Occupational Therapy.  Acute rehab placement is pending.  The patient has received a Medicaid number and [facility] is evaluating the patient and a bed offer is pending.  Vital signs are stable.  The patient is afebrile, alert, oriented and talkative.    Review of Systems     All pertinent negatives and positives are as above. All other systems have been reviewed and are negative unless otherwise stated.     Objective    Temp:  [97.5 °F (36.4 °C)-98.2 °F (36.8 °C)] 97.8 °F (36.6 °C)  Heart Rate:  [55-84] 70  Resp:  [14-18] 16  BP: (118-170)/(64-99) 155/99    Lab Results (last 24 hours)     ** No results found for the last 24 hours. **          Imaging Results (last 24 hours)     ** No results found for the last 24 hours. **             Intake/Output Summary (Last 24 hours) at 8/12/2019 1553  Last data filed at 8/12/2019 0413  Gross per 24 hour   Intake 480 ml   Output 730 ml   Net -250 ml       Physical Exam  The patient's mother and sister are present in the room.  All questions that they had were answered fully.  Constitutional: He is oriented to person, place, and time. He appears well-developed and well-nourished. He is cooperative. No distress.     HENT:   Head: Normocephalic and atraumatic.   Nose: Nose normal.   Mouth/Throat: Oropharynx is clear and moist. Facial asymmetry with weakness of the left facial muscles continues to improve slowly.    Eyes: Conjunctivae are normal.  No scleral icterus.    Neck: Normal range of motion. Neck supple. No JVD present.   Cardiovascular: Normal rate, regular rhythm, normal heart sounds and intact distal pulses.   No murmur heard.  Pulmonary/Chest: Effort normal. No respiratory distress.    Abdominal: Soft. Bowel sounds are normal. He exhibits no distension and no mass. There is no tenderness.   Musculoskeletal: He exhibits no edema.  Decreased range of motion and muscle tone on the left.  Neurological: He is alert and oriented to person, place, and time. He exhibits abnormal muscle tone (on left). Coordination (on left) abnormal.   Skin: Skin is warm and dry. No rash noted.   Psychiatric: He has a normal mood and affect.         Results Review:  I have reviewed the labs, radiology results, and diagnostic studies since my last progress note and made treatment changes reflective of the results.   I have reviewed the current medications.    Assessment/Plan     Active Hospital Problems    Diagnosis   • **CVA (cerebral vascular accident) (CMS/HCC)   • Vocal cord polyp   • Berry aneurysm   • Left hemiparesis (CMS/HCC)   • Benign essential HTN   • Medical non-compliance   • HLD (hyperlipidemia)   • Alcohol use disorder, moderate, dependence (CMS/HCC)       PLAN:  Acute rehab placement is pending  Continue PT/OT    Tay Collado DO   08/12/19   3:53 PM      Electronically signed by Tay Collado DO at 8/12/2019  3:55 PM     Donald Biggs APRN at 8/12/2019  7:50 AM          Olman Vaca  49 y.o.      Chief complaint:   No complaints    Subjective:  Symptoms:  Stable.    Diet:  Adequate intake.    Activity level: Impaired due to weakness.    Pain:  He reports no pain.      No events    Temp:  [97.5 °F (36.4 °C)-98.5 °F (36.9 °C)] 97.5 °F (36.4 °C)  Heart Rate:  [64-84] 64  Resp:  [18] 18  BP: (108-150)/(53-86) 148/86      Objective:  General Appearance:  Comfortable, well-appearing, in no acute distress and not in pain.    Vital signs: (most recent): Blood  "pressure 148/86, pulse 64, temperature 97.5 °F (36.4 °C), temperature source Oral, resp. rate 18, height 172 cm (67.72\"), weight 105 kg (231 lb 7.7 oz), SpO2 95 %.  Vital signs are normal.  No fever.    Output: Producing urine.    HEENT: Normal HEENT exam.    Lungs:  Normal effort and normal respiratory rate.  He is not in respiratory distress.    Heart: Normal rate.  Regular rhythm.    Chest: Symmetric chest wall expansion.   Extremities: Normal range of motion.    Skin:  Warm and dry.    Abdomen: Abdomen is soft and non-distended.  Bowel sounds are normal.   There is no abdominal tenderness.     Pulses: Distal pulses are intact.        Neurologic Exam     Mental Status   Oriented to person, place, and time.   Attention: normal. Concentration: normal.   Speech: speech is normal   Level of consciousness: alert  Knowledge: good.   Normal comprehension.     Cranial Nerves   Cranial nerves II through XII intact.     Motor Exam   Muscle bulk: normalLeft hemiparesis     Sensory Exam   Light touch normal.         CVA (cerebral vascular accident) (CMS/HCC)    Left hemiparesis (CMS/HCC)    Benign essential HTN    Medical non-compliance    HLD (hyperlipidemia)    Alcohol use disorder, moderate, dependence (CMS/HCC)    Vocal cord polyp    Berry aneurysm      Lab Results (last 24 hours)     ** No results found for the last 24 hours. **              Plan:   CV: Heart rate and blood pressure stable  Respiratory: Oxygen level stable  GI: Tolerating p.o.   adequate urine output  Neuro: Exam stable  Continue with physical and occupational therapy.  Discharge planning waiting on acute rehab.  Dr. Encinas is to talk to neurosurgeon at [facility] regarding aneurysm today for further recommendations and follow-up    DAREK Dumont      Electronically signed by Donald Biggs APRN at 8/12/2019  7:53 AM       Consult Notes (last 48 hours) (Notes from 8/12/2019  7:19 AM through 8/14/2019  7:19 AM)     No " notes of this type exist for this encounter.           Discharge Summary      Tobias Tay S, DO at 8/13/2019 11:31 AM              [facility] Medicine Services  DISCHARGE SUMMARY       Date of Admission: 8/8/2019  Date of Discharge:  8/13/2019  Primary Care Physician: Gordon Robles MD    Discharge Diagnoses:  Patient Active Problem List   Diagnosis   • Left hemiparesis (CMS/HCC)   • CVA (cerebral vascular accident) (CMS/HCC)   • Benign essential HTN   • Medical non-compliance   • HLD (hyperlipidemia)   • Alcohol use disorder, moderate, dependence (CMS/HCC)   • Vocal cord polyp   • Berry aneurysm         Presenting Problem/History of Present Illness:  CVA (cerebral vascular accident) (CMS/HCC) [I63.9]     Chief Complaint on Day of Discharge:   No complaint    History of Present Illness on Day of Discharge:   The patient continues to work diligently with physical therapy.  He has been accepted to acute rehab at [facility] and will be transferred there today.    Hospital Course  This 49-year-old white male was admitted with chief complaint of dizziness, facial numbness, left arm and leg weakness with numbnessand inability to appropriately use his left side.  The patient's symptoms began last night at about midnight.  He did not seek assistance until today when he presented to [facility] emergency department for further treatment evaluation.  Patient has a history of essential hypertension and hyperlipidemia for which she has been prescribed medications in the past.  The patient has been noncompliant with treatment and has not taken those medications in years.     Work-up at [facility] as an EKG without significant abnormality, CT scan of the head showing calcification of the right vertebral artery and both carotid arteries but no other acute abnormalities.  Chest x-ray showed heart and upper limits of normal in size otherwise no acute abnormality.  CBC  was within normal limits.  Urinalysis with within normal limits.  CMP within normal limits except for glucose of 133.  Cardiac markers were negative.  Brain natruretic peptide was elevated at 1230.    PLAN:   Admit to the neurological floor  Cardiac monitoring  Stat MRI scan of the brain without contrast  Stat CT angiogram of the head and neck  Aspirin 81 mg p.o. daily  High-dose statin daily  PT/OT/ST evaluation and treatment  Permissive hypertension  Neurology consultation  SCDs for DVT prophylaxis  Thiamine 100 mg IV daily x3 doses  Ativan per [facility] protocol    MRI scan of the brain showed extensive infarct in the right PCA territory.  CT angiogram of the neck showed insignificant stenosis of both ICAs at 20%.  There is a polypoid lesion associated with a left vocal fold.  ENT was consulted and felt that the lesion was benign and suggested outpatient follow-up.  CT angiogram of the head suggested steno-occlusive disease of the P2 segment of the right PCA.  A 6 mm laterally projecting aneurysm at the right MCA trifurcation was noted.  Neurosurgery was consulted to further evaluate that abnormality and make recommendations.  Physical therapy evaluated the patient and recommended acute rehab.  Permissive hypertension was not allowed because of the presence of the 6 mm berry aneurysm.  Repeat was added to the patient's regimen and referral was made to acute rehab.  Neurosurgery felt that surgical intervention was not indicated given the small size of the berry aneurysm.  The patient remained in hospital receiving physical and occupational therapies.  He has been accepted to acute rehabilitation today and is stable for transfer.        Consults:   Neurology:  Impression  1. Acute right PCA stroke involving  right thalamus.and medial occipital with left homonymous hemianopsia with occlusion of the right PCA--P2 segment.   2. Polypoid lesion  left vocal cord  3. Hyperlipidemia with LDL of 133 and goal of < 70.   4. No  hemodynamically significant intracranial nor vascular stenosis of neck vessels  5. No evidence of DM based on hemoglobin A1c of 5.7  6. Right MCA trifurcation aneurysm  7. Cognitive impairment     Plan  1. CT angio ordered stat (tried to se patient yesterday and discussed his situation with Dr Collado. Did review CTA and MRI yesterday and reviewed them again today with the patient.   2. ENT evaluation of left vocal cord polypoid lesion  3. Inpatient rehab.   4. Neurosurgery consultation in regard to aneurysm   5. B12, folate, TSH.   6.  thiamine     I discussed the patients findings and my recommendations with patient, nursing staff and Dr. Tobias Lord MD    Neurosurgery:  Assessment/Plan:   Imaging demonstrates a 6 mm x 5 mm right MCA aneurysm.  This is unruptured.  This is an incidental finding.  ISU IA standards dictate aneurysms under 7 mm the risk of treatment exceeds the risk of conservative care.  I am to discuss this case and showed the pictures to our endovascular colleagues at the [facility] to see if they would recommend treating this aneurysm in the context of his ischemic right PCA CVA.  Otherwise I recommend maintaining blood pressures less than 160 systolic and I encouraged smoking cessation.  I discussed this at length with the patient.  He acknowledged understand this.  His questions and concerns were addressed.     I discussed the patients findings and my recommendations with patient     Esequiel Encinas MD        Pertinent Test Results:   Lab Results (last 7 days)     Procedure Component Value Units Date/Time    Vitamin B12 [[id number]]  (Normal) Collected:  08/09/19 0437    Specimen:  Blood Updated:  08/09/19 1854     Vitamin B-12 368 pg/mL     Folate [[id number]]  (Normal) Collected:  08/09/19 0437    Specimen:  Blood Updated:  08/09/19 1854     Folate 10.30 ng/mL     TSH [[id number]]  (Normal) Collected:  08/09/19 0437    Specimen:  Blood Updated:  08/09/19  1820     TSH 1.660 mIU/mL     POC Glucose Once [[id number]]  (Normal) Collected:  08/09/19 1200    Specimen:  Blood Updated:  08/09/19 1212     Glucose 95 mg/dL      Comment: : [id number] Omardavid LopezsayMeter ID: [id number]       POC Glucose Once [[id number]]  (Normal) Collected:  08/09/19 0824    Specimen:  Blood Updated:  08/09/19 0840     Glucose 101 mg/dL      Comment: : [id number] Omar LindsayMeter ID: [id number]       Hemoglobin A1c [[id number]]  (Normal) Collected:  08/09/19 0437    Specimen:  Blood Updated:  08/09/19 0611     Hemoglobin A1C 5.7 %     Narrative:       Less than 6.0           Non-Diabetic Range  6.0-7.0                 ADA Therapeutic Target  Greater than 7.0        Action Suggested    Lipid Panel [[id number]]  (Abnormal) Collected:  08/09/19 0437    Specimen:  Blood Updated:  08/09/19 0554     Total Cholesterol 207 mg/dL      Triglycerides 161 mg/dL      HDL Cholesterol 38 mg/dL      LDL Cholesterol  133 mg/dL      LDL/HDL Ratio 3.60        Imaging Results (last 7 days)     Procedure Component Value Units Date/Time    CT Angiogram Head With & Without Contrast [[id number]] Collected:  08/08/19 1927     Updated:  08/08/19 1941    Narrative:       EXAMINATION: CT angiogram of the head with contrast dated 08/20/2019     DOSE: 504 mGycm. Automated exposure control was utilized to diminish  patient radiation dose..     HISTORY: Stroke symptoms     FINDINGS: Multiple contiguous axial images are obtained through the  [place] 1 mm intervals findings contrast ministration with  reformatted images obtained in the sagittal and coronal projections from  the original data set.     The right vertebral artery is dominant. There is mild plaquing with  associated stenosis of the proximal intracranial aspect of the right  vertebral artery. Both distal vertebral arteries are otherwise widely  patent to the level the basilar artery.     The basilar artery is normal in caliber. Both superior  cerebellar  arteries as well as the left posterior cerebral artery are normal in  caliber and appearance. There is some attenuation within the P2 segment  of the right posterior cerebral artery suggesting some underlying  steno-occlusive disease. This is in the same segment as the patient's  acute infarct.     The distal internal carotid arteries are widely patent. There is mild  calcific plaquing involving the cavernous and supraclinoid aspect of  both ICAs without evidence of rate limiting stenosis. There is a  hypoplastic A1 segment of the right anterior cerebral artery. A 6 mm  aneurysm is noted at the right MCA trifurcation. The anterior and middle  cerebral arteries are otherwise widely patent. No additional aneurysms  are identified.       Impression:       1.. Attenuation suggesting steno-occlusive disease of the P2 segment of  the right posterior cerebral artery. There is mild calcific plaquing  involving the proximal intracranial aspect of the right vertebral  artery. The posterior circulation is otherwise unremarkable.  2. 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 There is a hypoplastic A1 segment of the right anterior  cerebral artery. The anterior circulation is otherwise unremarkable.  3. The dural venous sinuses are unremarkable with no evidence of dural  venous sinus thrombosis.  This report was finalized on 08/08/2019 19:38 by Dr. Amado Escalona MD.    CT Angiogram Neck With & Without Contrast [[id number]] Collected:  08/08/19 1810     Updated:  08/08/19 1826    Narrative:       EXAMINATION: CT angiogram of the neck with contrast 08/08/2019     DOSE: 504 mGycm. Automated exposure control was utilized to diminish  patient radiation dose..     HISTORY: Stroke.     FINDINGS: Multiple contiguous axial and obtained from the aortic arch  through the skull base at 1 mm intervals following intravenous contrast  administration with reformatted images obtained in the sagittal  and  coronal projections from the original dataset as well as MIPS.     The study is degraded by motion. There is a calcified granuloma within  the right upper lobe. Lung apices are otherwise clear. The superior  mediastinum is unremarkable. There is mild tortuosity of the proximal  great vessels.     The thyroid gland is homogeneous in density without evidence of focal  nodule or mass. No evidence of adenopathy in the supraclavicular fossa.  The level of the true and false cords is remarkable for asymmetric  nodule which appears to be associated with the left vocal fold. This  measures approximately 5 mm in size and direct visualization is  recommended. There are some mildly prominent left posterior triangle  nodes including an 8 mm short axis node. Smaller nodes are noted within  the jugular chain bilaterally. The epiglottis and aryepiglottic folds  are unremarkable. The nasopharynx and parapharyngeal spaces are  unremarkable with no evidence of mass or mass effect.     There is some mild plaquing involving the innominate artery on the  right. The origin of the great vessels are otherwise unremarkable  including the vertebral arteries although again partially obscured  secondary to motion artifact. The right vertebral artery is dominant.     Both vertebral arteries are widely patent in their extracranial aspect.  There is calcific plaquing with mild associated stenosis of the proximal  intracranial segment of the right vertebral artery.     The right common carotid artery is widely patent to the level of the  carotid bifurcation. There is mild calcific plaquing involving the  carotid bulb and proximal aspect of the ICA. The more distal right ICA  is tortuous. There is very slight narrowing of the proximal segment of  the right ICA but with a less than 20% cross-sectional narrowing. The  more distal right ICA is widely patent.     The left common carotid artery is widely patent. There is mild calcific  plaquing  involving the carotid bulb and proximal aspect of the ICA again  resulting in only a very mild stenosis with a less than 20%  cross-sectional narrowing of the lumen of the vessel. The more distal  left ICA is tortuous but otherwise widely patent.       Impression:       1. There is mild calcific plaquing and noncalcific plaque involving the  carotid bulb and proximal aspect of both internal carotid arteries. This  is not resulting in a hemodynamically significant stenosis with a less  than 20% cross-sectional narrowing noted of both ICAs. The more distal  ICAs are tortuous but otherwise widely patent.  2. Minimal calcific plaquing involving the proximal right innominate.  The origin of the great vessels are otherwise unremarkable.  3. Right vertebral artery is dominant. Both vertebral arteries are  widely patent in their extracranial aspect. There is some mild calcific  plaquing with mild associated stenosis of the proximal intracranial  aspect of the right vertebral artery.  4. There is a polypoid lesion which appears to be associated with the  left vocal fold. This would warrant follow-up with direct visualization  following ENT consultation.  This report was finalized on 08/08/2019 18:23 by Dr. Amado Escalona MD.    MRI Brain Without Contrast [[id number]] Collected:  08/08/19 1656     Updated:  08/08/19 1700    Narrative:       EXAM: MR BRAIN WITHOUT IV CONTRAST 08/08/2019     COMPARISON: None      HISTORY: 49 years-old Male. Stroke      TECHNIQUE:   Routine pulse sequences of the brain were obtained without IV contrast.      REPORT:     There is diffusion restriction within the right thalamus and medial to  the lobe, with associated T2/flair abnormal signal, consistent with  acute right PCA infarct. There is no hemorrhagic conversion.     No acute hydrocephalus. No midline shift. No suspicious extra fluid  collection. Mild chronic microvascular changes. Mild-to-moderate  cerebral volume loss.     Mastoid air  "cells are clear. The paranasal sinuses are free of  obstructive mucosal disease. The intraorbital structures are  unremarkable. The basal cisterns are preserved.                   Impression:       1.  Acute extensive infarct in the right PCA territory.  This report was finalized on 08/08/2019 16:57 by Dr. Gwen Conley MD.          Condition on Discharge:    Stable    Physical Exam on Discharge:  /80 (BP Location: Left arm, Patient Position: Lying)   Pulse 71   Temp 98.2 °F (36.8 °C) (Oral)   Resp 16   Ht 172 cm (67.72\")   Wt 105 kg (231 lb 7.7 oz)   SpO2 91%   BMI 35.49 kg/m²    Physical Exam  Constitutional: He is oriented to person, place, and time. He appears well-developed and well-nourished. He is cooperative. No distress.     HENT:   Head: Normocephalic and atraumatic.   Nose: Nose normal.   Mouth/Throat: Oropharynx is clear and moist. Facial asymmetry with weakness of the left facial muscles continues to improve slowly.    Eyes: Conjunctivae are normal.  No scleral icterus.   Neck: Normal range of motion. Neck supple. No JVD present.   Cardiovascular: Normal rate, regular rhythm, normal heart sounds and intact distal pulses.   No murmur heard.  Pulmonary/Chest: Effort normal. No respiratory distress.    Abdominal: Soft. Bowel sounds are normal. He exhibits no distension and no mass. There is no tenderness.   Musculoskeletal: He exhibits no edema.  Decreased range of motion and muscle tone on the left.  Neurological: He is alert and oriented to person, place, and time. He exhibits abnormal muscle tone (on left). Coordination (on left) abnormal.   Skin: Skin is warm and dry. No rash noted.   Psychiatric: He has a normal mood and affect.         Discharge Disposition:  Rehab Facility or Unit (DC - External)    Discharge Medications:     Discharge Medications      New Medications      Instructions Start Date   amLODIPine 10 MG tablet  Commonly known as:  NORVASC   10 mg, Oral, Every 24 Hours " Scheduled   Start Date:  8/14/2019     aspirin 81 MG chewable tablet   81 mg, Oral, Daily   Start Date:  8/14/2019     atorvastatin 80 MG tablet  Commonly known as:  LIPITOR   80 mg, Oral, Nightly      nicotine 14 MG/24HR patch  Commonly known as:  NICODERM CQ   1 patch, Transdermal, Every 24 Hours      sennosides-docusate sodium 8.6-50 MG tablet  Commonly known as:  SENOKOT-S   2 tablets, Oral, 2 Times Daily      terazosin 5 MG capsule  Commonly known as:  HYTRIN   5 mg, Oral, Daily   Start Date:  8/14/2019            Discharge Diet:   Diet Instructions     Diet: Regular; Thin      Discharge Diet:  Regular    Fluid Consistency:  Thin          Discharge Care Plan / Instructions:   Discharge to [facility] rehab    Activity at Discharge:   Activity Instructions     Activity as Tolerated             Tay Collado DO  08/13/19  11:31 AM    Time: Discharge over 30 min    Please note that portions of this note may have been completed with a voice recognition program. Efforts were made to edit the dictations, but occasionally words are mistranscribed.            Electronically signed by Tay Collado DO at 8/13/2019 11:44 AM

## 2019-08-15 LAB
ANION GAP SERPL CALCULATED.3IONS-SCNC: 11 MMOL/L (ref 7–19)
BASOPHILS ABSOLUTE: 0.1 K/UL (ref 0–0.2)
BASOPHILS RELATIVE PERCENT: 0.6 % (ref 0–1)
BUN BLDV-MCNC: 17 MG/DL (ref 6–20)
CALCIUM SERPL-MCNC: 9.6 MG/DL (ref 8.6–10)
CHLORIDE BLD-SCNC: 101 MMOL/L (ref 98–111)
CO2: 27 MMOL/L (ref 22–29)
CREAT SERPL-MCNC: 0.8 MG/DL (ref 0.5–1.2)
EOSINOPHILS ABSOLUTE: 0.2 K/UL (ref 0–0.6)
EOSINOPHILS RELATIVE PERCENT: 2.5 % (ref 0–5)
GFR NON-AFRICAN AMERICAN: >60
GLUCOSE BLD-MCNC: 113 MG/DL (ref 74–109)
HCT VFR BLD CALC: 51.4 % (ref 42–52)
HEMOGLOBIN: 16.8 G/DL (ref 14–18)
LYMPHOCYTES ABSOLUTE: 2.7 K/UL (ref 1.1–4.5)
LYMPHOCYTES RELATIVE PERCENT: 32.5 % (ref 20–40)
MCH RBC QN AUTO: 31.9 PG (ref 27–31)
MCHC RBC AUTO-ENTMCNC: 32.7 G/DL (ref 33–37)
MCV RBC AUTO: 97.7 FL (ref 80–94)
MONOCYTES ABSOLUTE: 0.7 K/UL (ref 0–0.9)
MONOCYTES RELATIVE PERCENT: 8.3 % (ref 0–10)
NEUTROPHILS ABSOLUTE: 4.6 K/UL (ref 1.5–7.5)
NEUTROPHILS RELATIVE PERCENT: 55.6 % (ref 50–65)
PDW BLD-RTO: 13.1 % (ref 11.5–14.5)
PLATELET # BLD: 216 K/UL (ref 130–400)
PMV BLD AUTO: 10.2 FL (ref 9.4–12.4)
POTASSIUM REFLEX MAGNESIUM: 4.2 MMOL/L (ref 3.5–5)
RBC # BLD: 5.26 M/UL (ref 4.7–6.1)
SODIUM BLD-SCNC: 139 MMOL/L (ref 136–145)
WBC # BLD: 8.3 K/UL (ref 4.8–10.8)

## 2019-08-15 PROCEDURE — 97110 THERAPEUTIC EXERCISES: CPT

## 2019-08-15 PROCEDURE — 6370000000 HC RX 637 (ALT 250 FOR IP): Performed by: PSYCHIATRY & NEUROLOGY

## 2019-08-15 PROCEDURE — 36415 COLL VENOUS BLD VENIPUNCTURE: CPT

## 2019-08-15 PROCEDURE — 99232 SBSQ HOSP IP/OBS MODERATE 35: CPT | Performed by: PSYCHIATRY & NEUROLOGY

## 2019-08-15 PROCEDURE — 85025 COMPLETE CBC W/AUTO DIFF WBC: CPT

## 2019-08-15 PROCEDURE — 97530 THERAPEUTIC ACTIVITIES: CPT

## 2019-08-15 PROCEDURE — 1180000000 HC REHAB R&B

## 2019-08-15 PROCEDURE — 97116 GAIT TRAINING THERAPY: CPT

## 2019-08-15 PROCEDURE — 80048 BASIC METABOLIC PNL TOTAL CA: CPT

## 2019-08-15 PROCEDURE — 97127 HC SP THER IVNTJ W/FOCUS COG FUNCJ: CPT

## 2019-08-15 RX ADMIN — AMLODIPINE BESYLATE 10 MG: 10 TABLET ORAL at 08:38

## 2019-08-15 RX ADMIN — ALPRAZOLAM 0.5 MG: 0.5 TABLET ORAL at 08:38

## 2019-08-15 RX ADMIN — ACETAMINOPHEN 650 MG: 325 TABLET ORAL at 12:14

## 2019-08-15 RX ADMIN — PANTOPRAZOLE SODIUM 40 MG: 40 TABLET, DELAYED RELEASE ORAL at 08:38

## 2019-08-15 RX ADMIN — ATORVASTATIN CALCIUM 80 MG: 80 TABLET, FILM COATED ORAL at 20:05

## 2019-08-15 RX ADMIN — ALPRAZOLAM 0.5 MG: 0.5 TABLET ORAL at 16:41

## 2019-08-15 RX ADMIN — DOXAZOSIN 4 MG: 4 TABLET ORAL at 08:38

## 2019-08-15 RX ADMIN — ASPIRIN 81 MG 81 MG: 81 TABLET ORAL at 08:38

## 2019-08-15 RX ADMIN — PANTOPRAZOLE SODIUM 40 MG: 40 TABLET, DELAYED RELEASE ORAL at 20:05

## 2019-08-15 ASSESSMENT — PAIN SCALES - GENERAL: PAINLEVEL_OUTOF10: 6

## 2019-08-15 ASSESSMENT — 9 HOLE PEG TEST
TEST_RESULT: IMPAIRED
TESTTIME_SECONDS: 3.18
TEST_RESULT: FUNCTIONAL

## 2019-08-15 NOTE — PLAN OF CARE
Problem: Infection:  Goal: Will remain free from infection  Description  Will remain free from infection  Outcome: Ongoing     Problem: Safety:  Goal: Free from accidental physical injury  Description  Free from accidental physical injury  Outcome: Ongoing  Goal: Free from intentional harm  Description  Free from intentional harm  Outcome: Ongoing     Problem: Daily Care:  Goal: Daily care needs are met  Description  Daily care needs are met  Outcome: Ongoing     Problem: Pain:  Goal: Patient's pain/discomfort is manageable  Description  Patient's pain/discomfort is manageable  Outcome: Ongoing     Problem: Skin Integrity:  Goal: Skin integrity will stabilize  Description  Skin integrity will stabilize  Outcome: Ongoing     Problem: Discharge Planning:  Goal: Patients continuum of care needs are met  Description  Patients continuum of care needs are met  Outcome: Ongoing     Problem: HEMODYNAMIC STATUS  Goal: Patient has stable vital signs and fluid balance  Outcome: Ongoing     Problem: ACTIVITY INTOLERANCE/IMPAIRED MOBILITY  Goal: Mobility/activity is maintained at optimum level for patient  Outcome: Ongoing     Problem: COMMUNICATION IMPAIRMENT  Goal: Ability to express needs and understand communication  Outcome: Ongoing     Problem: Mobility - Impaired:  Goal: Mobility will improve  Description  Mobility will improve  Outcome: Ongoing     Problem: IP BLADDER/VOIDING  Goal: LTG - patient will complete bladder elimination  Outcome: Ongoing     Problem: IP BOWEL ELIMINATION  Goal: LTG - patient will have regular and routine bowel evacuation  Outcome: Ongoing     Problem: NUTRITION  Goal: Patient maintains adequate hydration  Outcome: Ongoing  Goal: Patient maintains weight  Outcome: Ongoing  Goal: Patient/Family demonstrates understanding of diet  Outcome: Ongoing  Goal: Patient will have no more than 5 lb weight change during LOS  Outcome: Ongoing  Goal: Patient will utilize adaptive techniques to administer

## 2019-08-15 NOTE — PROGRESS NOTES
Occupational Therapy  Facility/Department: [facility] 8 REHAB UNIT  Daily Treatment Note  NAME: Bethena Lanes  : 1970  MRN: [id number]    Date of Service: 8/15/2019    Discharge Recommendations:  Home with Home health OT       Assessment     Decreased functional mobility ; Decreased ADL status; Decreased strength; Decreased balance; Decreased sensation; Decreased high-level IADLs; Decreased fine motor control; Decreased coordination; Decreased vision/visual deficit  Safety Devices  Safety Devices in place: Yes(Left with PT )  Type of devices: Left in chair         Patient Diagnosis(es): There were no encounter diagnoses. has a past medical history of Alcohol dependence, uncomplicated ([facility] [address] Ataxia following cerebral infarction, Cerebral aneurysm, nonruptured, Hyperlipidemia, Hypertension, Nicotine dependence, and Psychiatric problem. has a past surgical history that includes Mouth surgery and lymph node biopsy.     Restrictions  Restrictions/Precautions  Restrictions/Precautions: Fall Risk  Required Braces or Orthoses?: No  Subjective   General  Chart Reviewed: Yes  Patient assessed for rehabilitation services?: Yes  Family / Caregiver Present: No  Diagnosis: R CVA          Objective     Transfers  Stand Step Transfers: Minimal assistance  Sit to stand: Contact guard assistance  Stand to sit: Contact guard assistance     Hand Dominance  Hand Dominance: Right  Left Hand Strength -  (lbs)  Handle Setting 2: 37.1  Left 9-Hole Peg Test  Left 9-Hole Peg Test: Impaired  Right Hand Strength -  (lbs)  Handle Setting 2: 86.76  Right 9-Hole Peg Test  Right 9-Hole Peg Test: Functional  Fine Motor Skills  Left 9-Hole Peg Test: Impaired  Left 9 Hole Peg Test Time (secs): 3.18  Right 9-Hole Peg Test: Functional           Plan   Plan  Current Treatment Recommendations: Strengthening, Balance Training, ROM, Functional Mobility Training, Patient/Caregiver Education & Training, Equipment Evaluation, Education, & procurement, Self-Care / ADL, Home Management Training      Goals  Short term goals  Time Frame for Short term goals: 1 week   Short term goal 1: Supervision with bathing hygiene. Short term goal 2: Supervision with LB dressing. Short term goal 3: Supervision with clothing management/hygiene for toileting. Short term goal 4: Supervision with toilet transfers. Short term goal 5: Supervision with ambulatory home making task. Short term goal 6: Supervision with one-two handed static standing task for 4 minutes. Short term goal 7: Patient will decrease time with 9-hole peg test by 5 seconds. Short term goal 8: Patient will increase L  strength by 4#. Long term goals  Time Frame for Long term goals : 2-3 weeks   Long term goal 1: Modified independent with bathing hygiene. Long term goal 2: Modified independent with overall dressing. Long term goal 3: Modified independent with toileting and toilet transfers. Long term goal 4: Independent with HEP. Long term goal 5: Patient verbalize DME needs. Patient Goals   Patient goals : Return home independently.         Therapy Time     08/15/19 0910   OT Individual Minutes   Time In 0910   Time Out 1000   Minutes 50   Time Code Minutes    Timed Code Treatment Minutes 50 Minutes      Individual Concurrent Group Co-treatment   Time In 1145         Time Out 3527         Minutes 15         Timed Code Treatment Minutes: Tiffany 36, OT

## 2019-08-16 PROCEDURE — 6370000000 HC RX 637 (ALT 250 FOR IP): Performed by: PSYCHIATRY & NEUROLOGY

## 2019-08-16 PROCEDURE — 97530 THERAPEUTIC ACTIVITIES: CPT

## 2019-08-16 PROCEDURE — 97127 HC SP THER IVNTJ W/FOCUS COG FUNCJ: CPT

## 2019-08-16 PROCEDURE — 1180000000 HC REHAB R&B

## 2019-08-16 PROCEDURE — 97116 GAIT TRAINING THERAPY: CPT

## 2019-08-16 PROCEDURE — 97110 THERAPEUTIC EXERCISES: CPT

## 2019-08-16 PROCEDURE — 97535 SELF CARE MNGMENT TRAINING: CPT

## 2019-08-16 PROCEDURE — 99232 SBSQ HOSP IP/OBS MODERATE 35: CPT | Performed by: PSYCHIATRY & NEUROLOGY

## 2019-08-16 RX ADMIN — ALPRAZOLAM 0.5 MG: 0.5 TABLET ORAL at 20:30

## 2019-08-16 RX ADMIN — PANTOPRAZOLE SODIUM 40 MG: 40 TABLET, DELAYED RELEASE ORAL at 07:36

## 2019-08-16 RX ADMIN — ATORVASTATIN CALCIUM 80 MG: 80 TABLET, FILM COATED ORAL at 20:30

## 2019-08-16 RX ADMIN — ASPIRIN 81 MG 81 MG: 81 TABLET ORAL at 07:36

## 2019-08-16 RX ADMIN — AMLODIPINE BESYLATE 10 MG: 10 TABLET ORAL at 07:36

## 2019-08-16 RX ADMIN — DOXAZOSIN 4 MG: 4 TABLET ORAL at 07:36

## 2019-08-16 RX ADMIN — PANTOPRAZOLE SODIUM 40 MG: 40 TABLET, DELAYED RELEASE ORAL at 20:30

## 2019-08-16 ASSESSMENT — PAIN SCALES - GENERAL
PAINLEVEL_OUTOF10: 0

## 2019-08-16 NOTE — PROGRESS NOTES
Patient:   Tracee Edwards  MR#:    [id number]   Room:    [facility]/821-01   YOB: 1970  Date of Progress Note: 8/16/2019  Time of Note                           8:10 AM  Consulting Physician:   Amelia Ledezma M.D. Attending Physician:  Amelia Ledezma MD     CHIEF COMPLAINT: Left-sided weakness with cognitive difficulties        Subjective  This 44 y.o. male  transferred from [facility] [place] to [facility] on 8/8/19 after feeling dizz and developing weakness and numbness in his L side. MRI showed Acute extensive infarct in the right PCA territory. 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 Neurosurgery was consulted. Dr. Onur Hayes was discussing imaging from aneursym w/colleagues at [facility] for recommendations for treatment. Speech eval revealed a Left visual field cut as well as some mild cognitive deficits. He is tolerating a reg diet w/thin liquids. No Complaints this morning. Slept well  REVIEW OF SYSTEMS:  Constitutional: No fevers No chills  Neck:No stiffness  Respiratory: No shortness of breath  Cardiovascular: No chest pain No palpitations  Gastrointestinal: No abdominal pain    Genitourinary: No Dysuria  Neurological: No headache, no confusion      PHYSICAL EXAM:  BP (!) 162/88   Pulse 62   Temp 96.4 °F (35.8 °C) (Temporal)   Resp 16   Ht 5' 7\" (1.702 m)   Wt 179 lb (81.2 kg)   SpO2 95%   BMI 28.04 kg/m²     Constitutional: he appears well-developed and well-nourished. Eyes - conjunctiva normal.  Pupils react to light  Ear, nose, throat -hearing intact to voice.  No scars, masses, or lesions over external nose or ears, no atrophy of tongue  Neck-symmetric, no masses noted, no jugular vein distension  Respiration- chest wall appears symmetric, good expansion,   normal effort without use of accessory muscles  Cardiovascular- RRR  Musculoskeletal - no significant wasting of muscles noted, no bony deformities, gait no gross ataxia  Extremities-no clubbing, cyanosis or edema  Skin - warm, dry, and intact. No rash, erythema, or pallor. Psychiatric - mood, affect, and behavior appear normal.      Neurology  NEUROLOGICAL EXAM:      Mental status   Awake, alert, fluent oriented x 3 appropriate affect  Attention and concentration appear appropriate  Recent and remote memory appears unremarkable  Speech normal without dysarthria  No clear issues with language       Cranial Nerves   CN II- Visual fields grossly unremarkable  CN III, IV,VI-EOMI, No nystagmus, conjugate eye movements, no ptosis  CN VII-no facial asymmetry  CN VIII-Hearing intact      Motor function  Antigravity x 4, 4/5 left arm and leg     Sensory function Intact to light touch     Cerebellar  left upper extremity ataxia     Tremor None present     Gait                  Not tested           Nursing/pcp notes, imaging,labs and vitals reviewed.      PT,OT and/or speech notes reviewed    Lab Results   Component Value Date    WBC 8.3 08/15/2019    HGB 16.8 08/15/2019    HCT 51.4 08/15/2019    MCV 97.7 (H) 08/15/2019     08/15/2019     Lab Results   Component Value Date     08/15/2019    K 4.2 08/15/2019     08/15/2019    CO2 27 08/15/2019    BUN 17 08/15/2019    CREATININE 0.8 08/15/2019    GLUCOSE 113 (H) 08/15/2019    CALCIUM 9.6 08/15/2019    LABGLOM >60 08/15/2019   No results found for: INRNo results found for: PHENYTOIN, ESR, CRP    Objective     Transfers  Stand Step Transfers: Minimal assistance  Sit to stand: Contact guard assistance  Stand to sit: Contact guard assistance  Hand Dominance  Hand Dominance: Right  Left Hand Strength -  (lbs)  Handle Setting 2: 37.1  Left 9-Hole Peg Test  Left 9-Hole Peg Test: Impaired  Right Hand Strength -  (lbs)  Handle Setting 2: 86.76  Right 9-Hole Peg Test  Right 9-Hole Peg Test: Functional  Fine Motor Skills  Left 9-Hole Peg Test: Impaired  Left 9 Hole Peg Test Time (secs): 3.18  Right 9-Hole Peg Test: Functional         Subjective: Patient alert and cooperative with all therapy tasks.      Objective: Patient was wanting to work on cell phone usage. Patient is having difficulty using his phone. SLP had him find his text messages, phone calls, home button, camera, and Internet icons. Patient does have them set up (by his cousin) for easier access. Patient was able to find the call button and then able to scroll through numbers. Patient then had to locate the text messages and type out a message. Patient had difficulty finding the letters secondary to visual deficits. SLP spoke about using a stylus to help with typing out messages. Patient also wanting SLP to help stop the screen from \"going to sleep. \" SLP was able to help him do this. SLP also showed patient how to set reminders to help remember to take his medications. He states this has been difficult for him and he knows he has not been doing it. SLP demonstrated and then patient had to complete. Patient was able to recall and set a reminder with minimal verbal cues. Will address this again tomorrow to see if patient continues to recall.      Patient does use a pill box at home, however states he has not been taking medications like he should. Will address medication management during his stay.      Patient completed visual scanning task where several items were placed in from of them and he had to locate certain symbols/colors. Patient requiring min verbal cues to look to the left during this task. At times it was noted patient was compensating for visual inattention.      During session patient also required cues to bring his arm up. Several times it was dangling off the side of his wheel chair and patient was not aware.      Patient does state he has memory problems for years. Did notice frustration when he was unable to recall certain situations.      [id number]       08/15/19 1033 08/15/19 1034   General   Response To Previous Treatment Patient with no complaints from previous session.   --    Family / Caregiver Present No  --

## 2019-08-16 NOTE — PROGRESS NOTES
Alyce Jonel  [id number]     08/16/19 1123 08/16/19 1124   General   Response To Previous Treatment Patient with no complaints from previous session. --    Family / Caregiver Present No  --    Subjective   Subjective Pt agreed to therapy this morning.    --    Pain Screening   Patient Currently in Pain No  --    Pain Assessment   Pain Assessment 0-10  --    Pain Level 0  --    Pre Treatment Pain Screening   Pain at present 0  --    Scale Used Numeric Score  --    Intervention List Patient able to continue with treatment  --    Bed Mobility   Rolling Stand by assistance  --    Supine to Sit Stand by assistance  --    Sit to Supine Stand by assistance  --    Transfers   Sit to Stand Contact guard assistance  --    Stand to sit Contact guard assistance  --    Bed to Chair Contact guard assistance  --    Ambulation   Ambulation? Yes  --    WB Status WBAT  --    Ambulation 1   Surface level tile  --    Device Rolling Walker  --    Assistance Contact guard assistance  --    Quality of Gait Pt tolerated increase in distance well. Pt kept advancing RW w/ LLE this morning despite VC's. --    Distance 76'  --    Exercises   Comments  --  Sitting Julio C LE ther ex x 20 reps. Conditions Requiring Skilled Therapeutic Intervention   Body structures, Functions, Activity limitations  --  Decreased functional mobility ; Decreased strength;Decreased endurance   Assessment  --  Pt tolerated amb and sitting ther ex well w/ minimal fatigue. Pt tolerated increase in amb distance well.     Prognosis  --  Good   Activity Tolerance   Activity Tolerance  --  Patient Tolerated treatment well   Electronically signed by Yasmin Garcia PTA on 8/16/2019 at 11:26 AM

## 2019-08-17 PROCEDURE — 1180000000 HC REHAB R&B

## 2019-08-17 PROCEDURE — 92507 TX SP LANG VOICE COMM INDIV: CPT

## 2019-08-17 PROCEDURE — 6370000000 HC RX 637 (ALT 250 FOR IP): Performed by: PSYCHIATRY & NEUROLOGY

## 2019-08-17 PROCEDURE — 97110 THERAPEUTIC EXERCISES: CPT

## 2019-08-17 PROCEDURE — 97535 SELF CARE MNGMENT TRAINING: CPT

## 2019-08-17 PROCEDURE — 97116 GAIT TRAINING THERAPY: CPT

## 2019-08-17 PROCEDURE — 97530 THERAPEUTIC ACTIVITIES: CPT

## 2019-08-17 RX ADMIN — ACETAMINOPHEN 650 MG: 325 TABLET ORAL at 18:48

## 2019-08-17 RX ADMIN — PANTOPRAZOLE SODIUM 40 MG: 40 TABLET, DELAYED RELEASE ORAL at 21:09

## 2019-08-17 RX ADMIN — ALPRAZOLAM 0.5 MG: 0.5 TABLET ORAL at 17:30

## 2019-08-17 RX ADMIN — ASPIRIN 81 MG 81 MG: 81 TABLET ORAL at 08:03

## 2019-08-17 RX ADMIN — PANTOPRAZOLE SODIUM 40 MG: 40 TABLET, DELAYED RELEASE ORAL at 08:03

## 2019-08-17 RX ADMIN — DOXAZOSIN 4 MG: 4 TABLET ORAL at 08:02

## 2019-08-17 RX ADMIN — AMLODIPINE BESYLATE 10 MG: 10 TABLET ORAL at 08:02

## 2019-08-17 RX ADMIN — ALPRAZOLAM 0.5 MG: 0.5 TABLET ORAL at 21:09

## 2019-08-17 RX ADMIN — ATORVASTATIN CALCIUM 80 MG: 80 TABLET, FILM COATED ORAL at 21:09

## 2019-08-17 ASSESSMENT — PAIN DESCRIPTION - PAIN TYPE
TYPE: ACUTE PAIN

## 2019-08-17 ASSESSMENT — PAIN SCALES - GENERAL
PAINLEVEL_OUTOF10: 0
PAINLEVEL_OUTOF10: 8
PAINLEVEL_OUTOF10: 6
PAINLEVEL_OUTOF10: 0
PAINLEVEL_OUTOF10: 7
PAINLEVEL_OUTOF10: 8

## 2019-08-17 ASSESSMENT — PAIN DESCRIPTION - ORIENTATION
ORIENTATION: LEFT

## 2019-08-17 ASSESSMENT — PAIN DESCRIPTION - LOCATION
LOCATION: HAND;SHOULDER
LOCATION: HAND;SHOULDER
LOCATION: HAND
LOCATION: HAND

## 2019-08-17 ASSESSMENT — PAIN DESCRIPTION - DESCRIPTORS
DESCRIPTORS: ACHING;SHARP
DESCRIPTORS: SHARP

## 2019-08-17 ASSESSMENT — PAIN DESCRIPTION - FREQUENCY: FREQUENCY: CONTINUOUS

## 2019-08-17 NOTE — PLAN OF CARE
Problem: Infection:  Goal: Will remain free from infection  Description  Will remain free from infection  8/17/2019 0447 by Татьяна Johnson RN  Outcome: Met This Shift  8/16/2019 1556 by Gisela Mcdonald RN  Outcome: Ongoing     Problem: Safety:  Goal: Free from accidental physical injury  Description  Free from accidental physical injury  8/17/2019 0447 by Татьяна Johnson RN  Outcome: Met This Shift  8/16/2019 1556 by Gisela Mcdonald RN  Outcome: Ongoing  Goal: Free from intentional harm  Description  Free from intentional harm  8/17/2019 0447 by Татьяна Johnson RN  Outcome: Met This Shift  8/16/2019 1556 by Gisela Mcdonald RN  Outcome: Ongoing     Problem: Daily Care:  Goal: Daily care needs are met  Description  Daily care needs are met  8/17/2019 0447 by Татьяна Johnson RN  Outcome: Met This Shift  8/16/2019 1556 by Gisela Mcdonald RN  Outcome: Ongoing     Problem: Pain:  Goal: Patient's pain/discomfort is manageable  Description  Patient's pain/discomfort is manageable  8/17/2019 0447 by Татьяна Johnson RN  Outcome: Met This Shift  8/16/2019 1556 by Gisela Mcdonald RN  Outcome: Ongoing     Problem: HEMODYNAMIC STATUS  Goal: Patient has stable vital signs and fluid balance  8/17/2019 0447 by Татьяна Johnson RN  Outcome: Met This Shift  8/16/2019 1556 by Gisela Mcdonald RN  Outcome: Ongoing     Problem: COMMUNICATION IMPAIRMENT  Goal: Ability to express needs and understand communication  8/17/2019 0447 by Татьяна Johnson RN  Outcome: Met This Shift  8/16/2019 1556 by Gisela Mcdonald RN  Outcome: Ongoing     Problem: IP BLADDER/VOIDING  Goal: LTG - patient will complete bladder elimination  8/17/2019 0447 by Татьяна Johnson RN  Outcome: Met This Shift  8/16/2019 1556 by Gisela Mcdonald RN  Outcome: Ongoing     Problem: IP BOWEL ELIMINATION  Goal: LTG - patient will have regular and routine bowel evacuation  8/17/2019 0447 by Татьяна Johnson RN  Outcome: Met This Shift  8/16/2019 1556 by Gisela Mcdonald RN  Outcome: Ongoing Problem: NUTRITION  Goal: Patient maintains adequate hydration  8/17/2019 0447 by Melina Arambula RN  Outcome: Met This Shift  8/16/2019 1556 by Vita Inman RN  Outcome: Ongoing  Goal: Patient maintains weight  8/17/2019 0447 by Melina Arambula RN  Outcome: Met This Shift  8/16/2019 1556 by Vita Inman RN  Outcome: Ongoing  Goal: Patient/Family demonstrates understanding of diet  8/17/2019 0447 by Melina Arambula RN  Outcome: Met This Shift  8/16/2019 1556 by Vita Inman RN  Outcome: Ongoing     Problem: PAIN  Goal: LTG - Patient will manage pain with the appropriate technique/Intervention  8/17/2019 0447 by Melina Arambula RN  Outcome: Met This Shift  8/16/2019 1556 by Vita Inman RN  Outcome: Ongoing  Goal: LTG - Patient will demonstrate intervention for managing pain  8/17/2019 0447 by Melina Arambula RN  Outcome: Met This Shift  8/16/2019 1556 by Vita Inman RN  Outcome: Ongoing  Goal: STG - Patient will reduce or eliminate use of analgesics  8/17/2019 0447 by Melina Arambula RN  Outcome: Met This Shift  8/16/2019 1556 by Vita Inman RN  Outcome: Ongoing  Goal: STG - pain is manageable through therapies  8/17/2019 0447 by Melina Arambula RN  Outcome: Met This Shift  8/16/2019 1556 by Vita Inman RN  Outcome: Ongoing  Goal: STG - Patient will verbalize an acceptable level of pain  8/17/2019 0447 by Melina Arambula RN  Outcome: Met This Shift  8/16/2019 1556 by Vita Inman RN  Outcome: Ongoing  Goal: STG - patients pain is managed to allow active participation in daily activities  8/17/2019 0447 by Melina Arambula RN  Outcome: Met This Shift  8/16/2019 1556 by Vita Inman RN  Outcome: Ongoing     Problem: Skin Integrity:  Goal: Skin integrity will stabilize  Description  Skin integrity will stabilize  8/17/2019 0447 by Melina Arambula RN  Outcome: Ongoing  8/16/2019 1556 by Vita Inman RN  Outcome: Ongoing     Problem: Discharge Planning:  Goal: Patients continuum of care needs are transfers  8/17/2019 0447 by Nadeem Carlisle RN  Outcome: Ongoing  8/16/2019 1556 by Flor Dominguez RN  Outcome: Ongoing     Problem: SKIN INTEGRITY  Goal: LTG - Patient will be free from infection  8/17/2019 0447 by Nadeem Carlisle RN  Outcome: Ongoing  8/16/2019 1556 by Flor Dominguez RN  Outcome: Ongoing  Goal: LTG - patient will maintain/improve skin integrity through proper skin care techniques  8/17/2019 0447 by Nadeem Carlisle RN  Outcome: Ongoing  8/16/2019 1556 by Flor Dominguez RN  Outcome: Ongoing  Goal: LTG - Patient will demonstrate appropriate pressure relief techniques  8/17/2019 0447 by Nadeem Carlisle RN  Outcome: Ongoing  8/16/2019 1556 by Flor Dominguez RN  Outcome: Ongoing  Goal: LTG - patient will demonstrate appropriate skin care techniques  8/17/2019 0447 by Nadeem Carlisle RN  Outcome: Ongoing  8/16/2019 1556 by Flor Dominguez RN  Outcome: Ongoing  Goal: STG - Patient demonstrates skin care/treatment/dressing change  8/17/2019 0447 by Nadeem Carlisle RN  Outcome: Ongoing  8/16/2019 1556 by Flor Dominguez RN  Outcome: Ongoing

## 2019-08-17 NOTE — PROGRESS NOTES
Balance: Contact guard assistance  Toilet Transfers  Toilet - Technique: Stand step  Equipment Used: Grab bars  Toilet Transfer: Stand by assistance     Transfers  Stand Step Transfers: Contact guard assistance  Sit to stand: Contact guard assistance  Stand to sit: Contact guard assistance                                            Type of ROM/Therapeutic Exercise  Type of ROM/Therapeutic Exercise: AROM  Comment: PNF and six pack, L UE                    Plan   Plan  Times per day: Twice a day  Current Treatment Recommendations: Strengthening, Balance Training, ROM, Functional Mobility Training, Patient/Caregiver Education & Training, Equipment Evaluation, Education, & procurement, Self-Care / ADL, Home Management Training  G-Code     OutComes Score                                                  AM-PAC Score             Goals  Short term goals  Time Frame for Short term goals: 1 week   Short term goal 1: Supervision with bathing hygiene. Short term goal 2: Supervision with LB dressing. Short term goal 3: Supervision with clothing management/hygiene for toileting. Short term goal 4: Supervision with toilet transfers. Short term goal 5: Supervision with ambulatory home making task. Short term goal 6: Supervision with one-two handed static standing task for 4 minutes. Short term goal 7: Patient will decrease time with 9-hole peg test by 5 seconds. Short term goal 8: Patient will increase L  strength by 4#. Long term goals  Time Frame for Long term goals : 2-3 weeks   Long term goal 1: Modified independent with bathing hygiene. Long term goal 2: Modified independent with overall dressing. Long term goal 3: Modified independent with toileting and toilet transfers. Long term goal 4: Independent with HEP. Long term goal 5: Patient verbalize DME needs. Patient Goals   Patient goals : Return home independently.         Therapy Time   Individual Concurrent Group Co-treatment   Time In 1100 Time Out 1130         Minutes 30         Timed Code Treatment Minutes: 110 [facility] Clif Caceres

## 2019-08-17 NOTE — FLOWSHEET NOTE
Up in w/c, c/o anxiety. Xanax 0.5mg given. No other c/o voiced.      08/17/19 0957   Assessment   Charting Type Shift assessment   Neurological   Orientation Level Oriented X4   Cognition Appropriate judgement   Language Clear   Size R Pupil (mm) 3   R Pupil Shape Round   R Pupil Reaction Brisk   Size L Pupil (mm) 3   L Pupil Shape Round   L Pupil Reaction Brisk   R Hand  Moderate   L Hand  Weak   R Foot Dorsiflexion Moderate   L Foot Dorsiflexion Weak   R Foot Plantar Flexion Moderate   L Foot Plantar Flexion Weak   RUE Motor Response Normal extension   Sensation RUE Full sensation   LUE Motor Response Responds to command   Sensation LUE Numbness   RLE Motor Response Responds to command   Sensation RLE Full sensation   LLE Motor Response Responds to command   Sensation LLE Numbness   HEENT   HEENT (WDL) WDL   Respiratory   Respiratory (WDL) WDL   Cardiac   Cardiac (WDL) WDL   Gastrointestinal   Abdominal (WDL) WDL   Peripheral Vascular   Peripheral Vascular (WDL) WDL   Skin Color/Condition   Skin Color/Condition (WDL) WDL   Skin Integrity   Skin Integrity (WDL) WDL   Genitourinary   Genitourinary (WDL) WDL   Psychosocial   Psychosocial (WDL) WDL

## 2019-08-17 NOTE — PROGRESS NOTES
Occupational Therapy  Facility/Department: [facility] 8 REHAB UNIT  Daily Treatment Note  NAME: Jenn Parikh  : 1970  MRN: [id number]    Date of Service: 2019    Discharge Recommendations:  Home with Home health OT       Assessment   Performance deficits / Impairments: Decreased functional mobility ; Decreased ADL status; Decreased strength;Decreased endurance;Decreased vision/visual deficit; Decreased high-level IADLs;Decreased fine motor control;Decreased coordination  Assessment: Patient tolerated tx well. Pt uses L hand to assist with ADL's. Patient has numbness on L side and decreased proprioception. Patient requires skilled OT to address the above deficits and return the patient home independently. Treatment Diagnosis: R CVA   Prognosis: Good  Activity Tolerance  Activity Tolerance: Patient Tolerated treatment well         Patient Diagnosis(es): There were no encounter diagnoses. has a past medical history of Alcohol dependence, uncomplicated ([address] Ataxia following cerebral infarction, Cerebral aneurysm, nonruptured, Hyperlipidemia, Hypertension, Nicotine dependence, and Psychiatric problem. has a past surgical history that includes Mouth surgery and lymph node biopsy. Restrictions  Restrictions/Precautions  Restrictions/Precautions: Fall Risk  Required Braces or Orthoses?: No  Subjective   General  Chart Reviewed: Yes  Patient assessed for rehabilitation services?: Yes  Response to previous treatment: Patient with no complaints from previous session  Family / Caregiver Present: No  Diagnosis: R CVA  Pain Assessment  Pain Assessment: 0-10  Pain Level: 8  Pain Type: Acute pain  Pain Location: Hand;Shoulder  Pain Orientation: Left  Pain Descriptors: Sharp;Tightness; Stabbing;Heaviness; Aching  Non-Pharmaceutical Pain Intervention(s): Massage  Vital Signs  Patient Currently in Pain: Yes   Orientation     Objective    ADL  Toileting: Stand by assistance        Balance  Sitting Balance:

## 2019-08-18 PROCEDURE — 6370000000 HC RX 637 (ALT 250 FOR IP): Performed by: PSYCHIATRY & NEUROLOGY

## 2019-08-18 PROCEDURE — 1180000000 HC REHAB R&B

## 2019-08-18 RX ADMIN — ALPRAZOLAM 0.5 MG: 0.5 TABLET ORAL at 20:51

## 2019-08-18 RX ADMIN — PANTOPRAZOLE SODIUM 40 MG: 40 TABLET, DELAYED RELEASE ORAL at 20:51

## 2019-08-18 RX ADMIN — ASPIRIN 81 MG 81 MG: 81 TABLET ORAL at 08:12

## 2019-08-18 RX ADMIN — AMLODIPINE BESYLATE 10 MG: 10 TABLET ORAL at 08:12

## 2019-08-18 RX ADMIN — ATORVASTATIN CALCIUM 80 MG: 80 TABLET, FILM COATED ORAL at 20:51

## 2019-08-18 RX ADMIN — ACETAMINOPHEN 650 MG: 325 TABLET ORAL at 17:10

## 2019-08-18 RX ADMIN — PANTOPRAZOLE SODIUM 40 MG: 40 TABLET, DELAYED RELEASE ORAL at 08:12

## 2019-08-18 RX ADMIN — ALPRAZOLAM 0.5 MG: 0.5 TABLET ORAL at 17:10

## 2019-08-18 RX ADMIN — DOXAZOSIN 4 MG: 4 TABLET ORAL at 08:12

## 2019-08-18 ASSESSMENT — PAIN SCALES - GENERAL
PAINLEVEL_OUTOF10: 0
PAINLEVEL_OUTOF10: 5
PAINLEVEL_OUTOF10: 7

## 2019-08-18 ASSESSMENT — PAIN DESCRIPTION - LOCATION: LOCATION: GENERALIZED

## 2019-08-18 ASSESSMENT — PAIN DESCRIPTION - FREQUENCY: FREQUENCY: INTERMITTENT

## 2019-08-18 ASSESSMENT — PAIN DESCRIPTION - DESCRIPTORS: DESCRIPTORS: DISCOMFORT

## 2019-08-18 ASSESSMENT — PAIN DESCRIPTION - PAIN TYPE: TYPE: ACUTE PAIN

## 2019-08-18 NOTE — PLAN OF CARE
Ongoing  Goal: Patient will have no more than 5 lb weight change during LOS  8/18/2019 1014 by Rosa Marcial RN  Outcome: Ongoing  8/18/2019 1014 by Rosa Marcial RN  Outcome: Ongoing  Goal: Patient will utilize adaptive techniques to administer nutrition  8/18/2019 1014 by Rosa Marcial RN  Outcome: Ongoing  8/18/2019 1014 by Rosa Marcial RN  Outcome: Ongoing  Goal: Patient will verbalize dietary restrictions  8/18/2019 1014 by Rosa Marcial RN  Outcome: Ongoing  8/18/2019 1014 by Rosa Marcial RN  Outcome: Ongoing     Problem: SAFETY  Goal: LTG - Patient will demonstrate safety requirements appropriate to situation/environment  8/18/2019 1014 by Rosa Marcial RN  Outcome: Ongoing  8/18/2019 1014 by Rosa Marcial RN  Outcome: Ongoing  Goal: LTG - patient will utilize safety techniques  8/18/2019 1014 by Rosa Marcial RN  Outcome: Ongoing  8/18/2019 1014 by Rosa Marcial RN  Outcome: Ongoing  Goal: STG - patient locks brakes on wheelchair  8/18/2019 1014 by Rosa Marcial RN  Outcome: Ongoing  8/18/2019 1014 by Rosa Marcial RN  Outcome: Ongoing  Goal: STG - Patient uses call light consistently to request assistance with transfers  8/18/2019 1014 by Rosa Marcial RN  Outcome: Ongoing  8/18/2019 1014 by Rosa Marcial RN  Outcome: Ongoing  Goal: STG - patient uses gait belt during all transfers  8/18/2019 1014 by Rosa Marcial RN  Outcome: Ongoing  8/18/2019 1014 by Rosa Marcial RN  Outcome: Ongoing     Problem: SKIN INTEGRITY  Goal: LTG - Patient will be free from infection  8/18/2019 1014 by Rosa Marcial RN  Outcome: Ongoing  8/18/2019 1014 by Rosa Marcial RN  Outcome: Ongoing  Goal: LTG - patient will maintain/improve skin integrity through proper skin care techniques  8/18/2019 1014 by Rosa Marcial RN  Outcome: Ongoing  8/18/2019 1014 by Rosa Marcial RN  Outcome: Ongoing  Goal: LTG - Patient will demonstrate appropriate pressure relief

## 2019-08-18 NOTE — PLAN OF CARE
Problem: Infection:  Goal: Will remain free from infection  Description  Will remain free from infection  Outcome: Ongoing     Problem: Safety:  Goal: Free from accidental physical injury  Description  Free from accidental physical injury  Outcome: Ongoing  Goal: Free from intentional harm  Description  Free from intentional harm  Outcome: Ongoing     Problem: Daily Care:  Goal: Daily care needs are met  Description  Daily care needs are met  Outcome: Ongoing     Problem: Pain:  Goal: Patient's pain/discomfort is manageable  Description  Patient's pain/discomfort is manageable  Outcome: Ongoing  Goal: Pain level will decrease  Description  Pain level will decrease  Outcome: Ongoing  Goal: Control of acute pain  Description  Control of acute pain  Outcome: Ongoing  Goal: Control of chronic pain  Description  Control of chronic pain  Outcome: Ongoing     Problem: Skin Integrity:  Goal: Skin integrity will stabilize  Description  Skin integrity will stabilize  Outcome: Ongoing     Problem: Discharge Planning:  Goal: Patients continuum of care needs are met  Description  Patients continuum of care needs are met  Outcome: Ongoing     Problem: HEMODYNAMIC STATUS  Goal: Patient has stable vital signs and fluid balance  Outcome: Ongoing     Problem: ACTIVITY INTOLERANCE/IMPAIRED MOBILITY  Goal: Mobility/activity is maintained at optimum level for patient  Outcome: Ongoing     Problem: COMMUNICATION IMPAIRMENT  Goal: Ability to express needs and understand communication  Outcome: Ongoing     Problem: Mobility - Impaired:  Goal: Mobility will improve  Description  Mobility will improve  Outcome: Ongoing     Problem: IP BLADDER/VOIDING  Goal: LTG - patient will complete bladder elimination  Outcome: Ongoing     Problem: IP BOWEL ELIMINATION  Goal: LTG - patient will have regular and routine bowel evacuation  Outcome: Ongoing     Problem: NUTRITION  Goal: Patient maintains adequate hydration  Outcome: Ongoing  Goal: Patient

## 2019-08-19 LAB
ANION GAP SERPL CALCULATED.3IONS-SCNC: 13 MMOL/L (ref 7–19)
BASOPHILS ABSOLUTE: 0 K/UL (ref 0–0.2)
BASOPHILS RELATIVE PERCENT: 0.4 % (ref 0–1)
BUN BLDV-MCNC: 15 MG/DL (ref 6–20)
CALCIUM SERPL-MCNC: 9.7 MG/DL (ref 8.6–10)
CHLORIDE BLD-SCNC: 102 MMOL/L (ref 98–111)
CO2: 27 MMOL/L (ref 22–29)
CREAT SERPL-MCNC: 0.7 MG/DL (ref 0.5–1.2)
EOSINOPHILS ABSOLUTE: 0.2 K/UL (ref 0–0.6)
EOSINOPHILS RELATIVE PERCENT: 2.2 % (ref 0–5)
GFR NON-AFRICAN AMERICAN: >60
GLUCOSE BLD-MCNC: 108 MG/DL (ref 74–109)
HCT VFR BLD CALC: 48.2 % (ref 42–52)
HEMOGLOBIN: 16.1 G/DL (ref 14–18)
IMMATURE GRANULOCYTES #: 0 K/UL
LYMPHOCYTES ABSOLUTE: 2.9 K/UL (ref 1.1–4.5)
LYMPHOCYTES RELATIVE PERCENT: 29.9 % (ref 20–40)
MCH RBC QN AUTO: 31.7 PG (ref 27–31)
MCHC RBC AUTO-ENTMCNC: 33.4 G/DL (ref 33–37)
MCV RBC AUTO: 94.9 FL (ref 80–94)
MONOCYTES ABSOLUTE: 0.9 K/UL (ref 0–0.9)
MONOCYTES RELATIVE PERCENT: 9.2 % (ref 0–10)
NEUTROPHILS ABSOLUTE: 5.5 K/UL (ref 1.5–7.5)
NEUTROPHILS RELATIVE PERCENT: 58 % (ref 50–65)
PDW BLD-RTO: 12.5 % (ref 11.5–14.5)
PLATELET # BLD: 227 K/UL (ref 130–400)
PMV BLD AUTO: 10 FL (ref 9.4–12.4)
POTASSIUM REFLEX MAGNESIUM: 4 MMOL/L (ref 3.5–5)
RBC # BLD: 5.08 M/UL (ref 4.7–6.1)
SODIUM BLD-SCNC: 142 MMOL/L (ref 136–145)
URIC ACID, SERUM: 5.1 MG/DL (ref 3.4–7)
WBC # BLD: 9.5 K/UL (ref 4.8–10.8)

## 2019-08-19 PROCEDURE — 36415 COLL VENOUS BLD VENIPUNCTURE: CPT

## 2019-08-19 PROCEDURE — 1180000000 HC REHAB R&B

## 2019-08-19 PROCEDURE — 84550 ASSAY OF BLOOD/URIC ACID: CPT

## 2019-08-19 PROCEDURE — 6370000000 HC RX 637 (ALT 250 FOR IP): Performed by: PSYCHIATRY & NEUROLOGY

## 2019-08-19 PROCEDURE — 97110 THERAPEUTIC EXERCISES: CPT

## 2019-08-19 PROCEDURE — 93923 UPR/LXTR ART STDY 3+ LVLS: CPT

## 2019-08-19 PROCEDURE — 85025 COMPLETE CBC W/AUTO DIFF WBC: CPT

## 2019-08-19 PROCEDURE — 97535 SELF CARE MNGMENT TRAINING: CPT

## 2019-08-19 PROCEDURE — 97127 HC SP THER IVNTJ W/FOCUS COG FUNCJ: CPT

## 2019-08-19 PROCEDURE — 6360000002 HC RX W HCPCS: Performed by: PSYCHIATRY & NEUROLOGY

## 2019-08-19 PROCEDURE — 99232 SBSQ HOSP IP/OBS MODERATE 35: CPT | Performed by: PSYCHIATRY & NEUROLOGY

## 2019-08-19 PROCEDURE — 80048 BASIC METABOLIC PNL TOTAL CA: CPT

## 2019-08-19 PROCEDURE — 97530 THERAPEUTIC ACTIVITIES: CPT

## 2019-08-19 PROCEDURE — 97116 GAIT TRAINING THERAPY: CPT

## 2019-08-19 RX ORDER — METHYLPREDNISOLONE 4 MG/1
4 TABLET ORAL SEE ADMIN INSTRUCTIONS
Status: DISCONTINUED | OUTPATIENT
Start: 2019-08-19 | End: 2019-08-19

## 2019-08-19 RX ORDER — METHYLPREDNISOLONE 4 MG/1
4 TABLET ORAL
Status: DISCONTINUED | OUTPATIENT
Start: 2019-08-20 | End: 2019-08-21 | Stop reason: HOSPADM

## 2019-08-19 RX ORDER — METHYLPREDNISOLONE 4 MG/1
24 TABLET ORAL ONCE
Status: COMPLETED | OUTPATIENT
Start: 2019-08-19 | End: 2019-08-19

## 2019-08-19 RX ORDER — METHYLPREDNISOLONE 4 MG/1
8 TABLET ORAL NIGHTLY
Status: COMPLETED | OUTPATIENT
Start: 2019-08-20 | End: 2019-08-20

## 2019-08-19 RX ORDER — METHYLPREDNISOLONE 4 MG/1
4 TABLET ORAL NIGHTLY
Status: DISCONTINUED | OUTPATIENT
Start: 2019-08-21 | End: 2019-08-21 | Stop reason: HOSPADM

## 2019-08-19 RX ADMIN — PANTOPRAZOLE SODIUM 40 MG: 40 TABLET, DELAYED RELEASE ORAL at 20:08

## 2019-08-19 RX ADMIN — ATORVASTATIN CALCIUM 80 MG: 80 TABLET, FILM COATED ORAL at 20:09

## 2019-08-19 RX ADMIN — ALPRAZOLAM 0.5 MG: 0.5 TABLET ORAL at 20:13

## 2019-08-19 RX ADMIN — DOCUSATE SODIUM 100 MG: 100 CAPSULE, LIQUID FILLED ORAL at 20:09

## 2019-08-19 RX ADMIN — METHYLPREDNISOLONE 24 MG: 4 TABLET ORAL at 08:20

## 2019-08-19 RX ADMIN — DOXAZOSIN 4 MG: 4 TABLET ORAL at 08:13

## 2019-08-19 RX ADMIN — ACETAMINOPHEN 650 MG: 325 TABLET ORAL at 11:11

## 2019-08-19 RX ADMIN — ASPIRIN 81 MG 81 MG: 81 TABLET ORAL at 08:13

## 2019-08-19 RX ADMIN — PANTOPRAZOLE SODIUM 40 MG: 40 TABLET, DELAYED RELEASE ORAL at 08:13

## 2019-08-19 RX ADMIN — AMLODIPINE BESYLATE 10 MG: 10 TABLET ORAL at 08:13

## 2019-08-19 RX ADMIN — ACETAMINOPHEN 650 MG: 325 TABLET ORAL at 20:13

## 2019-08-19 ASSESSMENT — PAIN DESCRIPTION - LOCATION
LOCATION: HAND

## 2019-08-19 ASSESSMENT — PAIN SCALES - GENERAL
PAINLEVEL_OUTOF10: 7
PAINLEVEL_OUTOF10: 4
PAINLEVEL_OUTOF10: 4
PAINLEVEL_OUTOF10: 7
PAINLEVEL_OUTOF10: 6

## 2019-08-19 ASSESSMENT — PAIN DESCRIPTION - ONSET
ONSET: ON-GOING
ONSET: ON-GOING

## 2019-08-19 ASSESSMENT — PAIN DESCRIPTION - PAIN TYPE
TYPE: ACUTE PAIN

## 2019-08-19 ASSESSMENT — PAIN DESCRIPTION - DESCRIPTORS
DESCRIPTORS: ACHING
DESCRIPTORS: ACHING;SORE
DESCRIPTORS: ACHING

## 2019-08-19 ASSESSMENT — PAIN DESCRIPTION - ORIENTATION
ORIENTATION: LEFT

## 2019-08-19 ASSESSMENT — PAIN DESCRIPTION - PROGRESSION
CLINICAL_PROGRESSION: NOT CHANGED
CLINICAL_PROGRESSION: GRADUALLY WORSENING

## 2019-08-19 ASSESSMENT — PAIN DESCRIPTION - FREQUENCY
FREQUENCY: CONTINUOUS
FREQUENCY: CONTINUOUS

## 2019-08-19 NOTE — PROGRESS NOTES
Jey Kline  [id number]     08/19/19 1137 08/19/19 1138 08/19/19 1139   General   Response To Previous Treatment Patient with no complaints from previous session. --   --    Family / Caregiver Present No  --   --    Subjective   Subjective Pt agreed to therapy this morning. --   --    Pain Screening   Patient Currently in Pain Yes  --   --    Pain Assessment   Pain Assessment 0-10  --   --    Pain Level 7  --   --    Pain Type Acute pain  --   --    Pain Location Hand  --   --    Pain Orientation Left  --   --    Pain Descriptors Aching  --   --    Patient's Stated Pain Goal No pain  --   --    Pain Frequency Continuous  --   --    Pain Onset On-going  --   --    Clinical Progression Gradually worsening  --   --    Response to Pain Intervention None  --   --    Pre Treatment Pain Screening   Pain at present 7  --   --    Scale Used Numeric Score  --   --    Intervention List Patient able to continue with treatment  --   --    Bed Mobility   Rolling  --  Modified independent  --    Supine to Sit  --  Modified independent  --    Sit to Supine  --  Modified independent  --    Transfers   Sit to Stand  --  Stand by assistance  --    Stand to sit  --  Stand by assistance  --    Bed to Chair  --  Stand by assistance  (W/ RW)  --    Car Transfer  --  Contact guard assistance  --    Ambulation   Ambulation?  --  Yes  --    WB Status  --  WBAT  --    Ambulation 1   Surface  --  level tile  --    Device  --  Rolling Walker  --    Assistance  --  Contact guard assistance  --    Quality of Gait  --  Pt continues to show excessive L hip flexion during swing phase.    --    Distance  --  150'x2  --    Stairs/Curb   Stairs?  --  Yes  --    Stairs   # Steps   --  8  --    Rails  --  Bilateral  --    Device  --  No Device  --    Assistance  --  Contact guard assistance  --    Comment  --  PT amb well up and down stairs,, but needed VC's to slow pace. --    Exercises   Comments  --   --  Practiced Car TF and Stairs training.

## 2019-08-19 NOTE — PROGRESS NOTES
Pt completed visual scanning activities with no cuing needed. Pt was observed utilizing compensatory strategies to aid in the completion of the tasks. Pt required cuing to complete functional memory tasks which required reading a small passage and providing written responses. Pt demo difficulty looking back at correct part of passage to find answers. Pt did require some cuing to be aware of left arm.      Objective    ADL  LE Dressing: Supervision  Toileting: Stand by assistance  Balance  Sitting Balance: Supervision  Standing Balance: Contact guard assistance  Toilet Transfers  Toilet - Technique: Stand step  Equipment Used: Grab bars  Toilet Transfer: Stand by assistance  Transfers  Stand Step Transfers: Contact guard assistance  Sit to stand: Contact guard assistance  Stand to sit: Contact guard assistance  Type of ROM/Therapeutic Exercise  Type of ROM/Therapeutic Exercise: AROM  Comment: PNF and six pack, L UE       08/17/19 1008   Subjective   Subjective Pt ready to participate but c/ L hand hurting   General Comment   Comments BP increased initially but decreased to 123/83 and 70bpm after 20 min of sitting ex    Pain Assessment   Pain Assessment 0-10   Pain Level 7   Pain Type Acute pain   Pain Location Hand   Pain Orientation Left   Pain Descriptors Sharp   Non-Pharmaceutical Pain Intervention(s) Repositioned; Rest   Patient's Stated Pain Goal No pain   Vital Signs   Pulse 76   Heart Rate Source Monitor   BP (!) 140/93   BP Location Right upper arm   BP Upper/Lower Upper   Oxygen Therapy   SpO2 97 %   O2 Device None (Room air)   Pre Treatment Pain Screening   Pain at present 7   Scale Used Numeric Score   Intervention List Patient able to continue with treatment   Ambulation   Ambulation? Yes   WB Status WBAT   More Ambulation?  Yes   Ambulation 1   Surface level tile   Device Rolling Walker   Other Apparatus Wheelchair follow   Assistance Contact guard assistance   Quality of Gait pt tolerated increased

## 2019-08-19 NOTE — PATIENT CARE CONFERENCE
[facility] ACUTE INPATIENT REHABILITATION  TEAM CONFERENCE NOTE    Date: 2019  Patient Name: Marilyn Staton        MRN: [id number]    : 1970  (44 y.o.)  Gender: male      Diagnosis: R CVA (L side involvement), Jha Aneursym       PHYSICAL THERAPY  STRENGTH  Strength RLE  Strength RLE: WNL  Comment: Grossly 5/5  Strength LLE  Strength LLE: Exception  Comment: Grossly 3+/5   ROM  AROM RLE (degrees)  RLE AROM: WNL  AROM LLE (degrees)  LLE AROM : WFL  BED MOBILITY  Bed Mobility  Bridging: Supervision  Rolling: Modified independent  Supine to Sit: Modified independent  Sit to Supine: Modified independent  Scooting: Modified independent  TRANSFERS  Transfers  Sit to Stand: Modified independent  Stand to sit: Modified independent  Bed to Chair: Modified independent  Stand Pivot Transfers: Contact guard assistance(To pt's R side )  Lateral Transfers: Modified independent(Toilet transfer)  Car Transfer: Contact guard assistance  WHEELCHAIR PROPULSION  Propulsion 1  Propulsion: Manual  Level: Level Tile  Method: RUE, RLE, LUE(Primarily use RUE/RLE)  Level of Assistance: Stand by assistance  Description/ Details: Incorporated turning, L veer   Distance: 75'  AMBULATION  Ambulation 1  Surface: level tile  Device: Small Rickey Amory  Other Apparatus: Wheelchair follow  Assistance: Contact guard assistance, Minimal assistance  Quality of Gait: Mild unsteadiness noted and one episode of LOB that required min assist to regain. 2-point gait pattern. Gait Deviations: Slow Noemi  Distance: 150' x2  Comments: Incorporated turns. STAIRS  Stairs  # Steps : 8  Rails: Bilateral  Device: No Device  Assistance: Contact guard assistance  Comment: PT amb well up and down stairs,, but needed VC's to slow pace.   FIM SCORES  Bed, Chair, Wheel Chair: 5  Walk: 4  Wheel Chair: 2 - Maximal Assistance Requires up to Maximal Assistance AND requires assistance of one person to operate wheelchair between  feet  Stairs: goals               []Not Progressing towards goals       GOALS MET: 0/5 STG 0/1 LTG    OCCUPATIONAL THERAPY    OT FIM PROGRESS   ADMIT SCORE CURRENT SCORE GOAL   EATING Eatin - Feeds self with setup/supervision/cues and/or requires only setup/supervision/cues to perform tube feedings Eatin - Patient feeds self GOAL: Eatin   GROOMING Groomin - Able to perform 3-4 tasks with touching help Groomin - Requires setup/cues to do all tasks GOAL: Groomin   BATHING Bathin - Able to bathe all 10 areas with setup/sup/cues(SBA, shower ) Bathin - Able to bathe all 10 areas with setup/sup/cues GOAL: Bathin   UPPER EXTREMITY DRESSING Dressing-Upper: 5 - Requires setup/supervision/cues and/or requires assist with presthesis/brace only Dressing-Upper: 5 - Requires setup/supervision/cues and/or requires assist with presthesis/brace only GOAL: Dressing-Upper: 7   LOWER EXTREMITY DRESSING Dressing-Lower: 4 - Requires assist with buttons/zippers/shoelaces and/or assist with shoes only Dressing-Lower: 5 - Requires setup/supervision/cues and/or staff applies TEDS/prosthesis/brace only GOAL: Dressing-Lower: 6   TOILETING Toiletin - Requires steadying assistance only Toiletin - Requires setup/supervision/cues GOAL: Toiletin   TOILET TRANSFER Toilet Transfer: 4 - Requires steadying assistance only < 25% assist Toilet Transfer: 5 - Requires setup/supervision/cues GOAL: Toilet: 6   TUB/SHOWER TRANSFER Primary Mode: Tub     Shower Transfer: 4 - Minimal contact assistance, pt. expends 75% or more effort   Primary Mode: Tub     Shower Transfer: 4 - Minimal contact assistance, pt. expends 75% or more effort     Primary Mode: Tub  GOAL: Tub, Shower: 6         Cognition:  Comprehension: 5 - Patient understands basic needs (hungry/hot/pain)   Expression: 5 - Expresses basic ideas/needs only (hungry/hot/pain)  Social Interaction: 5 - Patient is appropriate with supervision/cues  Problem Solvin - urinal, bedpan, absorbant pad, or requires medication to manage bladder    Bowel  Level of Assistance: Bowel Level of Assistance: 5- Requires helper to provide or empty bedpan, bedside commode, or requires set-up/cues to move bowels  Frequency of Accidents: Bowel Frequency of Accidents: 6 - No accidents, uses device like bedpan, diaper, bedside commode colostomy, or requires medication to manage bowels    Wounds/Incisions/Ulcers: No skin issues identified     Jc Scale Score: 18    Pain: pain in left hand. Tylenol is used as pain control as needed. X-ray to left hand is ordered for today. Consultations/Labs/X-rays:     Family Education:  FTD scheduled 8/21/19  Fall Risk:  White Score: 48    Fall in the last week?no      Other Nursing Issues: L arm edema (arthritis). BM 20. Xanax prn. Incont bladder x's. General diet. SOCIAL WORK/CASE MANAGEMENT  Assessment: Previously lived alone, but noted memory difficulty for sometime before this and poor health management. His sister, Baron Holcomb, indicates she wants to offer support and has told him family can offer help- \"can do better than this\"    Discharge Plan   Estimated Length of Stay: 1 week  Destination: to sister's home, which will be new geographic location    Pass: No    Services at Discharge: Other need continued rehabilitative services    Equipment at Discharge: to be determined    Progress made in the prior week:  1. Significant improvement in ambulatory distance  2. No physical assistance required for transfers  3. Supervision only with standing level ADLs  4.   5.      Goals for following week:  1. Improved performance with steps for safe entrance into home  2.  Distant supervision for ambulatory ADLs  3.   4.   5.     Factors facilitating achievement of predicted outcomes: Cooperative    Barriers to the achievement of predicted outcomes: Attention    Team Members Present at Conference:  : Leif Saldivar   Occupational Therapist:

## 2019-08-19 NOTE — PROGRESS NOTES
Bilateral lower extremity arterial segmental pressures with PPG performed. RT   LT    High thigh:  1.44   1.24  Low thigh:  1.42   1.28  Calf:   1.18   1.10  Ankle (PT):  1.17   1.16  Ankle (DP):  1.26   1.18          Digit:   1.17   1.45    Post exercise ELIZABETH:  1.11   1.13    This is a preliminary report. Final report pending.

## 2019-08-20 ENCOUNTER — APPOINTMENT (OUTPATIENT)
Dept: GENERAL RADIOLOGY | Age: 49
DRG: 057 | End: 2019-08-20
Attending: PSYCHIATRY & NEUROLOGY
Payer: MEDICAID

## 2019-08-20 PROCEDURE — 99232 SBSQ HOSP IP/OBS MODERATE 35: CPT | Performed by: PSYCHIATRY & NEUROLOGY

## 2019-08-20 PROCEDURE — 97127 HC SP THER IVNTJ W/FOCUS COG FUNCJ: CPT

## 2019-08-20 PROCEDURE — 97116 GAIT TRAINING THERAPY: CPT

## 2019-08-20 PROCEDURE — 97530 THERAPEUTIC ACTIVITIES: CPT

## 2019-08-20 PROCEDURE — 97110 THERAPEUTIC EXERCISES: CPT

## 2019-08-20 PROCEDURE — 1180000000 HC REHAB R&B

## 2019-08-20 PROCEDURE — 73130 X-RAY EXAM OF HAND: CPT

## 2019-08-20 PROCEDURE — 6370000000 HC RX 637 (ALT 250 FOR IP): Performed by: PSYCHIATRY & NEUROLOGY

## 2019-08-20 PROCEDURE — 6360000002 HC RX W HCPCS: Performed by: PSYCHIATRY & NEUROLOGY

## 2019-08-20 RX ORDER — TRAZODONE HYDROCHLORIDE 100 MG/1
100 TABLET ORAL NIGHTLY
Status: DISCONTINUED | OUTPATIENT
Start: 2019-08-20 | End: 2019-08-21 | Stop reason: HOSPADM

## 2019-08-20 RX ADMIN — POLYETHYLENE GLYCOL 3350 17 G: 17 POWDER, FOR SOLUTION ORAL at 07:57

## 2019-08-20 RX ADMIN — ATORVASTATIN CALCIUM 80 MG: 80 TABLET, FILM COATED ORAL at 20:18

## 2019-08-20 RX ADMIN — PANTOPRAZOLE SODIUM 40 MG: 40 TABLET, DELAYED RELEASE ORAL at 20:18

## 2019-08-20 RX ADMIN — DOCUSATE SODIUM 100 MG: 100 CAPSULE, LIQUID FILLED ORAL at 07:57

## 2019-08-20 RX ADMIN — ALPRAZOLAM 0.5 MG: 0.5 TABLET ORAL at 20:24

## 2019-08-20 RX ADMIN — ALPRAZOLAM 0.5 MG: 0.5 TABLET ORAL at 17:19

## 2019-08-20 RX ADMIN — METHYLPREDNISOLONE 8 MG: 4 TABLET ORAL at 20:18

## 2019-08-20 RX ADMIN — ALPRAZOLAM 0.5 MG: 0.5 TABLET ORAL at 06:35

## 2019-08-20 RX ADMIN — ASPIRIN 81 MG 81 MG: 81 TABLET ORAL at 07:57

## 2019-08-20 RX ADMIN — DOXAZOSIN 4 MG: 4 TABLET ORAL at 07:57

## 2019-08-20 RX ADMIN — METHYLPREDNISOLONE 4 MG: 4 TABLET ORAL at 12:55

## 2019-08-20 RX ADMIN — TRAZODONE HYDROCHLORIDE 100 MG: 100 TABLET ORAL at 20:20

## 2019-08-20 RX ADMIN — SENNOSIDES AND DOCUSATE SODIUM 2 TABLET: 8.6; 5 TABLET ORAL at 07:58

## 2019-08-20 RX ADMIN — AMLODIPINE BESYLATE 10 MG: 10 TABLET ORAL at 07:58

## 2019-08-20 RX ADMIN — METHYLPREDNISOLONE 4 MG: 4 TABLET ORAL at 17:11

## 2019-08-20 RX ADMIN — PANTOPRAZOLE SODIUM 40 MG: 40 TABLET, DELAYED RELEASE ORAL at 07:57

## 2019-08-20 ASSESSMENT — PAIN SCALES - GENERAL
PAINLEVEL_OUTOF10: 0
PAINLEVEL_OUTOF10: 0
PAINLEVEL_OUTOF10: 4

## 2019-08-20 ASSESSMENT — PAIN DESCRIPTION - ORIENTATION: ORIENTATION: LEFT

## 2019-08-20 ASSESSMENT — PAIN DESCRIPTION - PROGRESSION: CLINICAL_PROGRESSION: NOT CHANGED

## 2019-08-20 ASSESSMENT — PAIN DESCRIPTION - LOCATION: LOCATION: HAND

## 2019-08-20 ASSESSMENT — PAIN DESCRIPTION - PAIN TYPE: TYPE: ACUTE PAIN

## 2019-08-20 NOTE — FLOWSHEET NOTE
Up in w/c. Has been awake most of the night, drinking coffee. States his left hand is bothering him, and also his \"nerves\".      08/19/19 2011   Assessment   Charting Type Shift assessment   Neurological   Orientation Level Oriented X4   Cognition Appropriate judgement   Language Clear   Size R Pupil (mm) 3   R Pupil Shape Round   R Pupil Reaction Brisk   Size L Pupil (mm) 3   L Pupil Shape Round   L Pupil Reaction Brisk   R Hand  Moderate   L Hand  Weak   R Foot Dorsiflexion Moderate   L Foot Dorsiflexion Weak   R Foot Plantar Flexion Moderate   L Foot Plantar Flexion Weak   RUE Motor Response Normal extension   Sensation RUE Full sensation   LUE Motor Response Responds to command   Sensation LUE Numbness   RLE Motor Response Responds to command   Sensation RLE Full sensation   LLE Motor Response Responds to command   Sensation LLE Numbness   HEENT   HEENT (WDL) WDL   Respiratory   Respiratory (WDL) WDL   Cardiac   Cardiac (WDL) WDL   Gastrointestinal   Abdominal (WDL) WDL   Peripheral Vascular   Peripheral Vascular (WDL) WDL   Skin Color/Condition   Skin Color/Condition (WDL) WDL   Skin Integrity   Skin Integrity (WDL) WDL   Genitourinary   Genitourinary (WDL) WDL   Psychosocial   Psychosocial (WDL) WDL

## 2019-08-20 NOTE — PROGRESS NOTES
compensatory strategies to aid in the completion of the tasks. Pt required cuing to complete functional memory tasks which required reading a small passage and providing written responses. Pt demo difficulty looking back at correct part of passage to find answers. Pt did require some cuing to be aware of left arm.      Objective    ADL  LE Dressing: Supervision  Toileting: Stand by assistance  Balance  Sitting Balance: Supervision  Standing Balance: Contact guard assistance  Toilet Transfers  Toilet - Technique: Stand step  Equipment Used: Grab bars  Toilet Transfer: Stand by assistance  Transfers  Stand Step Transfers: Contact guard assistance  Sit to stand: Contact guard assistance  Stand to sit: Contact guard assistance  Type of ROM/Therapeutic Exercise  Type of ROM/Therapeutic Exercise: AROM  Comment: PNF and six pack, L UE       08/17/19 1008   Subjective   Subjective Pt ready to participate but c/ L hand hurting   General Comment   Comments BP increased initially but decreased to 123/83 and 70bpm after 20 min of sitting ex    Pain Assessment   Pain Assessment 0-10   Pain Level 7   Pain Type Acute pain   Pain Location Hand   Pain Orientation Left   Pain Descriptors Sharp   Non-Pharmaceutical Pain Intervention(s) Repositioned; Rest   Patient's Stated Pain Goal No pain   Vital Signs   Pulse 76   Heart Rate Source Monitor   BP (!) 140/93   BP Location Right upper arm   BP Upper/Lower Upper   Oxygen Therapy   SpO2 97 %   O2 Device None (Room air)   Pre Treatment Pain Screening   Pain at present 7   Scale Used Numeric Score   Intervention List Patient able to continue with treatment   Ambulation   Ambulation? Yes   WB Status WBAT   More Ambulation?  Yes   Ambulation 1   Surface level tile   Device Rolling Walker   Other Apparatus Wheelchair follow   Assistance Contact guard assistance   Quality of Gait pt tolerated increased distance well   Gait Deviations Slow Noemi   Distance 100'   Comments improved 3 point gait pattern    Ambulation 2   Surface - 2 level tile   Device 2 Hemiwalker   Other Apparatus 2 Wheelchair follow   Assistance 2 Contact guard assistance   Quality of Gait 2 good ALEX   Gait Deviations Decreased arm swing;Deviated path   Distance 76'   Comments vc's for erect posture    Exercises   Gluteal Sets 15   Hip Flexion 15x2   Hip Extension/Leg Presses 15   Hip Abduction 15x2   Knee Long Arc Quad 15x2   Ankle Pumps 15x2   Comments sitting BLE ex 2# LLE 4-1/2# RLE        RECORD REVIEW: Previous medical records, medications were reviewed at today's visit    IMPRESSION:   1. Right PCA stroke aspirin, risk factor modification, PT/OT/SLP  2. Essential hypertension-continue current medications and monitoring  3. Hyperlipidemia-statin  4. Smoker-NicoDerm patch  5. BPH- Hytrin  6. DVT prophylaxis- SCDs  7. GI-bowel regimen  8. Anxiety disorder. Self medicates with alcohol and THC at home. Use Xanax while here. Check xray of the left hand and Medrol Dosepak for left hand swelling and pain.     Staff tomorrow

## 2019-08-20 NOTE — PROGRESS NOTES
08/20/19 1330   Restrictions/Precautions   Restrictions/Precautions Fall Risk   General   Diagnosis R CVA   Subjective   Subjective Pt checked for Ind t/fs a wheelchair level. Referred to PT for additional check. Functional Mobility   Functional - Mobility Device Rishabh FERNANDEZ [facility])   Activity Other   Assist Level Contact guard assistance   Functional Mobility Comments pt completed t/fs in apt area with [facility] [place] [place], [facility]. Able to complete ambulation with safe technique with this device. Toilet Transfers   Toilet - Technique Stand step   Equipment Used Grab bars   Toilet Transfer Supervision   Toilet Transfers Comments after initial cues for technique pt transferred safely w/c <-> toilet with GBs  (ok for Ind t/fs, will follow up with PT)   Wheelchair Bed Transfers   Wheelchair/Bed - Technique Stand step   Equipment Used Bed; Wheelchair   Level of Asssistance Supervision   Wheelchair Transfers Comments after intial cues completed with good safety   (ok for Ind t/fs, will follow up with PT)   Type of ROM/Therapeutic Exercise   Type of ROM/Therapeutic Exercise AROM; Cane/Wand   Comment 2#   Coordination   Gross Motor targeting tasks in all planes with OPHELIA   Fine Motor L UE 1/2 \" place and remove   Hand Assessment Comment   Hand Assessment Comment continues to have pain in L hand that impedes therapy   Short term goals   Short term goal 2 MET   Short term goal 3 MET   Short term goal 4 MET   Assessment   Performance deficits / Impairments Decreased functional mobility ; Decreased ADL status; Decreased ROM; Decreased strength;Decreased endurance;Decreased vision/visual deficit; Decreased high-level IADLs   Timed Code Treatment Minutes 60 Minutes   Activity Tolerance   Activity Tolerance Patient Tolerated treatment well   Safety Devices   Safety Devices in place Yes

## 2019-08-20 NOTE — PROGRESS NOTES
08/20/19 1532 08/20/19 1533 08/20/19 1555   Bed Mobility   Rolling Modified independent  --   --    Supine to Sit Modified independent  --   --    Sit to Supine Modified independent  --   --    Scooting Modified independent  --   --    Transfers   Sit to Stand Modified independent  --   --    Stand to sit Modified independent  --   --    Bed to Chair Modified independent  --   --    Lateral Transfers Modified independent  (Toilet transfer)  --   --    Ambulation   Ambulation?  --  Yes  --    WB Status  --  WBAT  --    Ambulation 1   Surface  --  level tile  --    Device  --  [employer]  --    Assistance  --  Contact guard assistance;Minimal assistance  --    Quality of Gait  --  Mild unsteadiness noted and one episode of LOB that required min assist to regain. 2-point gait pattern. --    Distance  --  150' x2  --    Comments  --  Incorporated turns. --    Conditions Requiring Skilled Therapeutic Intervention   Assessment  --   --  Practiced and pt is ready for Independent transfers in room.    Activity Tolerance   Activity Tolerance  --   --  Patient Tolerated treatment well   Safety Devices   Type of devices  --   --  Call light within reach   Electronically signed by Tierney Damian PTA on 8/20/2019 at 4:08 PM

## 2019-08-20 NOTE — PROGRESS NOTES
[]Other  Evaluation of Education:   []Verbalized understanding   []Demonstrates without assistance  [x]Demonstrates with assistance  []Needs further instruction     []No evidence of learning                  []No family present      Plan: [x]Continue with current plan of care    []Modify current plan of care as follows:    []Discharge patient    Discharge Location:    Services/Supervision Recommended:      [x]Patient continues to require treatment by a licensed therapist to address functional deficits as outlined in the established plan of care.

## 2019-08-20 NOTE — PLAN OF CARE
than 5 lb weight change during LOS  8/20/2019 1016 by Yulissa Mcgowan RN  Outcome: Ongoing  8/19/2019 2227 by Jorge Alberto Roth RN  Outcome: Ongoing  Goal: Patient will utilize adaptive techniques to administer nutrition  8/20/2019 1016 by Yulissa Mcgowan RN  Outcome: Ongoing  8/19/2019 2227 by Jorge Alberto Roth RN  Outcome: Ongoing  Goal: Patient will verbalize dietary restrictions  8/20/2019 1016 by Yulissa Mcgowan RN  Outcome: Ongoing  8/19/2019 2227 by Jorge Alberto Roth RN  Outcome: Ongoing     Problem: SAFETY  Goal: LTG - Patient will demonstrate safety requirements appropriate to situation/environment  8/20/2019 1016 by Yulissa Mcgowan RN  Outcome: Ongoing  8/19/2019 2227 by Jorge Alberto Roth RN  Outcome: Ongoing  Goal: LTG - patient will utilize safety techniques  8/20/2019 1016 by Yulissa Mcgowan RN  Outcome: Ongoing  8/19/2019 2227 by Jorge Alberto Roth RN  Outcome: Ongoing  Goal: STG - patient locks brakes on wheelchair  8/20/2019 1016 by Yulissa Mcgowan RN  Outcome: Ongoing  8/19/2019 2227 by Jorge Alberto Roth RN  Outcome: Ongoing  Goal: STG - Patient uses call light consistently to request assistance with transfers  8/20/2019 1016 by Yulissa Mcgowan RN  Outcome: Ongoing  8/19/2019 2227 by Jorge Alberto Roth RN  Outcome: Ongoing  Goal: STG - patient uses gait belt during all transfers  8/20/2019 1016 by Yulissa Mcgowan RN  Outcome: Ongoing  8/19/2019 2227 by Jorge Alberto Roth RN  Outcome: Ongoing     Problem: SKIN INTEGRITY  Goal: LTG - Patient will be free from infection  8/20/2019 1016 by Yulissa Mcgowan RN  Outcome: Ongoing  8/19/2019 2227 by Jorge Alberto Roth RN  Outcome: Ongoing  Goal: LTG - patient will maintain/improve skin integrity through proper skin care techniques  8/20/2019 1016 by Yulissa Mcgowan RN  Outcome: Ongoing  8/19/2019 2227 by Jorge Alberto Roth RN  Outcome: Ongoing  Goal: LTG - Patient will demonstrate appropriate pressure relief techniques  8/20/2019 1016 by Yulissa Mcgowan RN  Outcome: Ongoing  8/19/2019 2227 by Tenzin Crowe RN  Outcome: Ongoing  Goal: LTG - patient will demonstrate appropriate skin care techniques  8/20/2019 1016 by Edward Can RN  Outcome: Ongoing  8/19/2019 2227 by Tenzin Crowe RN  Outcome: Ongoing  Goal: STG - Patient demonstrates skin care/treatment/dressing change  8/20/2019 1016 by Edward Can RN  Outcome: Ongoing  8/19/2019 2227 by Tenzin Crowe RN  Outcome: Ongoing     Problem: PAIN  Goal: LTG - Patient will manage pain with the appropriate technique/Intervention  8/20/2019 1016 by Edward Can RN  Outcome: Ongoing  8/19/2019 2227 by Tenzin Crowe RN  Outcome: Ongoing  Goal: LTG - Patient will demonstrate intervention for managing pain  8/20/2019 1016 by Edward Can RN  Outcome: Ongoing  8/19/2019 2227 by Tenzin Crowe RN  Outcome: Ongoing  Goal: STG - Patient will reduce or eliminate use of analgesics  8/20/2019 1016 by Edward Can RN  Outcome: Ongoing  8/19/2019 2227 by Tenzin Crowe RN  Outcome: Ongoing  Goal: STG - pain is manageable through therapies  8/20/2019 1016 by Edward Can RN  Outcome: Ongoing  8/19/2019 2227 by Tenzin Crowe RN  Outcome: Ongoing  Goal: STG - Patient will verbalize an acceptable level of pain  8/20/2019 1016 by Edward Can RN  Outcome: Ongoing  8/19/2019 2227 by Tenzin Crowe RN  Outcome: Ongoing  Goal: STG - patients pain is managed to allow active participation in daily activities  8/20/2019 1016 by Edward Can RN  Outcome: Ongoing  8/19/2019 2227 by Tenzin Crowe RN  Outcome: Ongoing

## 2019-08-20 NOTE — PROGRESS NOTES
[facility]  Test for Patient [place] in the Areas of Transfers/Ambulation    Ambulation/Transfers   · Independent ambulation in room with assistive device:  No      · Independent transfers from wheelchair to surface in room:  Yes     Bathroom Transfers  · Independent transfers in patient bathroom:  Yes         Inpatient Rehabilitation Nursing and Therapies feel as though the patient qualifies for an acute rehabilitation test for independence in the areas of transfers and ambulation prior to discharging from Inpatient Rehabilitation Unit at [facility].  This test for independence in the areas of transfers and ambulation must be agreed upon by the patient's physician, nurse, and therapists.         Nurse Approval:  Electronically signed by Randy Miller RN on 8/21/2019 at 8:53 AM    Physical Therapist Approval:  Electronically signed by Juan Ralph PT on 8/21/2019 at 8:39 AM      Occupational Therapist Approval: Electronically signed by GRABIEL Parsons on 8/20/2019 at 3:26 PM

## 2019-08-21 ENCOUNTER — TELEPHONE (OUTPATIENT)
Dept: NEUROLOGY | Age: 49
End: 2019-08-21

## 2019-08-21 VITALS
BODY MASS INDEX: 30.45 KG/M2 | SYSTOLIC BLOOD PRESSURE: 131 MMHG | HEIGHT: 67 IN | HEART RATE: 68 BPM | DIASTOLIC BLOOD PRESSURE: 75 MMHG | WEIGHT: 194.03 LBS | TEMPERATURE: 97.7 F | RESPIRATION RATE: 18 BRPM | OXYGEN SATURATION: 94 %

## 2019-08-21 PROCEDURE — 97110 THERAPEUTIC EXERCISES: CPT

## 2019-08-21 PROCEDURE — 6370000000 HC RX 637 (ALT 250 FOR IP): Performed by: PSYCHIATRY & NEUROLOGY

## 2019-08-21 PROCEDURE — 6360000002 HC RX W HCPCS: Performed by: PSYCHIATRY & NEUROLOGY

## 2019-08-21 PROCEDURE — 97530 THERAPEUTIC ACTIVITIES: CPT

## 2019-08-21 PROCEDURE — 99233 SBSQ HOSP IP/OBS HIGH 50: CPT | Performed by: PSYCHIATRY & NEUROLOGY

## 2019-08-21 PROCEDURE — 97127 HC SP THER IVNTJ W/FOCUS COG FUNCJ: CPT

## 2019-08-21 PROCEDURE — 97116 GAIT TRAINING THERAPY: CPT

## 2019-08-21 RX ORDER — TRAZODONE HYDROCHLORIDE 100 MG/1
100 TABLET ORAL NIGHTLY
Qty: 30 TABLET | Refills: 0 | Status: SHIPPED | OUTPATIENT
Start: 2019-08-21

## 2019-08-21 RX ORDER — METHYLPREDNISOLONE 4 MG/1
4 TABLET ORAL NIGHTLY
Qty: 6 TABLET | Refills: 0 | Status: SHIPPED | OUTPATIENT
Start: 2019-08-21 | End: 2019-08-27

## 2019-08-21 RX ORDER — ALPRAZOLAM 0.25 MG/1
0.25 TABLET ORAL 3 TIMES DAILY PRN
Status: DISCONTINUED | OUTPATIENT
Start: 2019-08-21 | End: 2019-08-21 | Stop reason: HOSPADM

## 2019-08-21 RX ORDER — ATORVASTATIN CALCIUM 80 MG/1
80 TABLET, FILM COATED ORAL NIGHTLY
Qty: 30 TABLET | Refills: 3 | Status: SHIPPED | OUTPATIENT
Start: 2019-08-21

## 2019-08-21 RX ORDER — METHYLPREDNISOLONE 4 MG/1
4 TABLET ORAL
Qty: 6 TABLET | Refills: 0 | Status: SHIPPED | OUTPATIENT
Start: 2019-08-21 | End: 2019-08-27

## 2019-08-21 RX ORDER — PANTOPRAZOLE SODIUM 40 MG/1
40 TABLET, DELAYED RELEASE ORAL 2 TIMES DAILY
Qty: 30 TABLET | Refills: 3 | Status: SHIPPED | OUTPATIENT
Start: 2019-08-21

## 2019-08-21 RX ORDER — AMLODIPINE BESYLATE 10 MG/1
10 TABLET ORAL DAILY
Qty: 30 TABLET | Refills: 3 | Status: SHIPPED | OUTPATIENT
Start: 2019-08-21

## 2019-08-21 RX ORDER — METHYLPREDNISOLONE 4 MG/1
4 TABLET ORAL
Qty: 6 TABLET | Refills: 0 | Status: SHIPPED | OUTPATIENT
Start: 2019-08-22 | End: 2019-08-28

## 2019-08-21 RX ORDER — ASPIRIN 81 MG/1
81 TABLET, CHEWABLE ORAL DAILY
Qty: 30 TABLET | Refills: 3 | Status: SHIPPED | OUTPATIENT
Start: 2019-08-21

## 2019-08-21 RX ORDER — TERAZOSIN 5 MG/1
5 CAPSULE ORAL DAILY
Qty: 30 CAPSULE | Refills: 3 | Status: SHIPPED | OUTPATIENT
Start: 2019-08-21

## 2019-08-21 RX ORDER — ALPRAZOLAM 0.25 MG/1
0.25 TABLET ORAL 3 TIMES DAILY PRN
Qty: 30 TABLET | Refills: 0 | Status: SHIPPED | OUTPATIENT
Start: 2019-08-21 | End: 2019-09-20

## 2019-08-21 RX ADMIN — METHYLPREDNISOLONE 4 MG: 4 TABLET ORAL at 11:52

## 2019-08-21 RX ADMIN — METHYLPREDNISOLONE 4 MG: 4 TABLET ORAL at 05:54

## 2019-08-21 RX ADMIN — DOXAZOSIN 4 MG: 4 TABLET ORAL at 08:45

## 2019-08-21 RX ADMIN — AMLODIPINE BESYLATE 10 MG: 10 TABLET ORAL at 08:46

## 2019-08-21 RX ADMIN — ASPIRIN 81 MG 81 MG: 81 TABLET ORAL at 08:45

## 2019-08-21 RX ADMIN — DOCUSATE SODIUM 100 MG: 100 CAPSULE, LIQUID FILLED ORAL at 08:45

## 2019-08-21 RX ADMIN — PANTOPRAZOLE SODIUM 40 MG: 40 TABLET, DELAYED RELEASE ORAL at 08:46

## 2019-08-21 ASSESSMENT — PAIN SCALES - GENERAL
PAINLEVEL_OUTOF10: 0
PAINLEVEL_OUTOF10: 0

## 2019-08-21 NOTE — PROGRESS NOTES
Oral, BID, Amelia Ledezma MD    polyethylene glycol [facility]) packet 17 g, 17 g, Oral, Daily, Amelia Ledezma MD    bisacodyl (DULCOLAX) suppository 10 mg, 10 mg, Rectal, Daily PRN, Amelia Ledezma MD    calcium carbonate (TUMS) chewable tablet 500 mg, 500 mg, Oral, TID PRN, Amelia Ledezma MD, 500 mg at 08/13/19 2010    pantoprazole (PROTONIX) tablet 40 mg, 40 mg, Oral, BID, Amelia Ledezma MD, 40 mg at 08/13/19 2155    aluminum & magnesium hydroxide-simethicone (MAALOX) 200-200-20 MG/5ML suspension 30 mL, 30 mL, Oral, Q4H PRN, Amelia Ledezma MD, 30 mL at 08/13/19 2155        Allergies:  Patient has no known allergies.     Social History:   TOBACCO:   reports that he has been smoking. He started smoking about 41 years ago. He has a 40.00 pack-year smoking history. He has never used smokeless tobacco.  ETOH:   reports that he drinks alcohol.     Family History:   Family History             Problem Relation Age of Onset    Diabetes Mother      Cancer Mother      Stroke Mother                    Physical Exam:    Vitals: /84   Pulse 58   Temp 96.9 °F (36.1 °C) (Temporal)   Resp 18   Ht 5' 7\" (1.702 m)   Wt 179 lb (81.2 kg)   SpO2 96%   BMI 28.04 kg/m²      Constitutional - well developed, well nourished. Eyes - conjunctiva normal.  Pupils react to light  Ear, nose, throat -hearing intact to finger rub No scars, masses, or lesions over external nose or ears, no atrophy of tongue  Neck-symmetric, no masses noted, no jugular vein distension  Respiration- chest wall appears symmetric, good expansion,   normal effort without use of accessory muscles  Cardiovascular- RRR without m,r,g  Musculoskeletal - no significant wasting of muscles noted, no bony deformities  Extremities-no clubbing, cyanosis or edema  Skin - warm, dry, and intact. No rash, erythema, or pallor.   Psychiatric - mood, affect, and behavior appear normal.       Neurological exam  Awake, alert, fluent oriented x 3 appropriate affect  Attention and concentration appear appropriate  Recent and remote memory appears unremarkable  Speech normal without dysarthria  No clear issues with language of fund of knowledge     Cranial Nerve Exam   CN II- Visual fields grossly unremarkable  CN III, IV,VI-EOMI, No nystagmus, conjugate eye movements, no ptosis  CN VII-no facial assymetry  CN VIII-Hearing intact   CN IX and X- Palate elevates in midline  CN XI-good shoulder shrug  CN XII-Tongue midline with no fasciculations or fibrillations     Motor Exam  mild weakness on the left with significant ataxia        Reflexes   2+ biceps bilaterally  2+ brachioradialis  2+patella  2+ ankle jerks  No clonus ankles  No Hauser's sign bilateral hands     Tremors- no tremors in hands or head noted     Gait  Not tested     Coordination  Finger to nose-unremarkable           CBC:       Recent Labs     08/13/19  2224   WBC 10.0   HGB 16.3            BMP:  No results for input(s): NA, K, CL, CO2, BUN, CREATININE, GLUCOSE in the last 72 hours.     Hepatic: No results for input(s): AST, ALT, ALB, BILITOT, ALKPHOS in the last 72 hours.     Lipids: No results for input(s): CHOL, HDL in the last 72 hours.     Invalid input(s): LDLCALCU     INR: No results for input(s): INR in the last 72 hours.           Assessment and Plan      1. Right PCA stroke aspirin, risk factor modification, PT/OT/SLP  2. Essential hypertension-continue current medications and monitoring  3. Hyperlipidemia-statin  4. Smoker-NicoDerm patch  5. BPH- Hytrin  6. DVT prophylaxis- SCDs  7.   GI-bowel regimen              Patient's functional assessment  Prior to hospitalization the patient was continent of bowel and bladder     Functional assessment  All notes from reehab data were reviewed regarding the patient's functional status.     Current therapy  Requires PT, OT and/or speech therapy     Anticipated discharge approximately 16 days     Anticipated discharge setting  Home with home

## 2019-08-21 NOTE — PROGRESS NOTES
closure/address checking/editing/reading sentences (aloud)/paragraphs/signs/maps with minimal cues. SHORT TERM GOAL #5:  Goal 5: Initiate use of compensatory strategies (line guide, margin guide, page rotation, lens, visual marker) during functional reading tasks in structured 1:1/ small group/unstructured setting           Comprehension: 5 - Patient understands basic needs (hungry/hot/pain)  Expression: 5 - Expresses basic ideas/needs only (hungry/hot/pain)  Social Interaction: 7 - Patient has appropriate behavior/relations 100% of the time  Problem Solvin - Patient solves simple/routine tasks 75-90%+   Memory: 4 - Patient remembers 75-90%+ of the time    ASSESSMENT:  Assessment: [x]Progressing towards goals          []Not Progressing towards goals    Patient Tolerance of Treatment:   [x]Tolerated well []Tolerated fair []Required rest breaks []Fatigued    Education:  Learner:  [x]Patient          []Significant Other          []Other  Education provided regarding:  [x]Goals and POC   []Diet and swallowing precautions    []Home Exercise Program  []Progress and/or discharge information  Method of Education:  [x]Discussion          []Demonstration          []Handout          []Other  Evaluation of Education:   [x]Verbalized understanding   []Demonstrates without assistance  []Demonstrates with assistance  []Needs further instruction     []No evidence of learning                  []No family present      Plan: [x]Continue with current plan of care    []Modify current plan of care as follows:    []Discharge patient    Discharge Location:    Services/Supervision Recommended:      [x]Patient continues to require treatment by a licensed therapist to address functional deficits as outlined in the established plan of care.       Electronically Signed By:  Tad Moura  2019,10:39 AM.

## 2019-08-21 NOTE — PROGRESS NOTES
Occupational Therapy     08/19/19 1300   Restrictions/Precautions   Restrictions/Precautions Fall Risk   General   Diagnosis R CVA   Balance   Standing Balance Stand by assistance   Functional Mobility   Functional - Mobility Device Rolling Walker   Activity To/from bathroom   Assist Level Contact guard assistance   Type of ROM/Therapeutic Exercise   Type of ROM/Therapeutic Exercise AROM   Comment PNF diagonals L UE   Coordination   Fine Motor multiple L hand FM tasks   Assessment   Performance deficits / Impairments Decreased functional mobility ; Decreased ADL status; Decreased strength;Decreased vision/visual deficit; Decreased high-level IADLs;Decreased fine motor control;Decreased coordination   Timed Code Treatment Minutes 60 Minutes   Activity Tolerance   Activity Tolerance Patient Tolerated treatment well

## 2019-08-21 NOTE — DISCHARGE SUMMARY
Occupational Therapy Discharge Summary         Date: 2019  Patient Name: Jsoe Macedo        MRN: [id number]    : 1970  (44 y.o.)  Gender: male      Diagnosis: R CVA (L side involvement), Jha Aneursym   Restrictions/Precautions  Restrictions/Precautions: General Precautions, Up Ad Jnenifer  Required Braces or Orthoses?: No      Discharge Date: 19    ADL Discharge FIM Scores:  Eatin - Patient feeds self  Groomin - Patient independent with all grooming tasks  Bathin - Able to bathe all 10 areas with device  Dressing-Upper: 7 - Patient independently dresses upper body  Dressing-Lower: 6 - Independent with device/prosthesis  Toiletin - Requires device (grab bar/walker/etc.)  Toilet Transfer: 6 - Independent with device (grab bar/walker/slide bar)  Tub Transfer: 6 - Modified independence(to TTB)  Shower Transfer: 4 - Minimal contact assistance, pt. expends 75% or more effort      Comprehension: 6 - Complex ideas 90% or device (hearing aid or glasses- if patient is primarily a visual learner)  Expression: 6 - Device used to express complex ideas/needs  Social Interaction: 7 - Patient has appropriate behavior/relations 100% of the time  Problem Solvin - Patient able to solve simple/routine tasks  Memory: 5 - Patient requires prompting with stress/unfamiliar situations    UE Functioning:  R WFLs, L decreased strength, distally decreased FM and sensation    Home Management:  Functional Mobility  Functional - Mobility Device: Cane- LBQC  Activity: Other  Assist Level: Modified independent        Adaptive Equipment/DME Status:   Arranged LBQC, rec TTB  Home Equipment: (No DME)      Pain Assessment:  Pain Level: 6  Pain Location: Hand, Shoulder    Remaining Problems:  Decreased functional mobility ; Decreased sensation;Decreased high-level IADLs;Decreased vision/visual deficit    STGs:  Short term goals  Time Frame for Short term goals: 1 week   Short term goal 1: Supervision with bathing
Needed: Independent  CARE Score: 6  Discharge Goal: Independent    Lying to Sitting on Side of Bed  Assistance Needed: Independent  CARE Score: 6  Discharge Goal: Independent    Sit to Stand  Assistance Needed: Independent  CARE Score: 6  Discharge Goal: Independent    Chair/Bed-to-Chair Transfer  Assistance Needed: Independent  CARE Score: 6  Discharge Goal: Independent    Car Transfer  Assistance Needed: Independent  Reason if not Attempted: Not attempted due to medical condition or safety concerns  CARE Score: 6  Discharge Goal: Independent    Walk 10 Feet  Assistance Needed: Supervision or touching assistance  CARE Score: 4  Discharge Goal: Independent    Walk 50 Feet with Two Turns  Assistance Needed: Supervision or touching assistance  Reason if not Attempted: Not attempted due to medical condition or safety concerns  CARE Score: 4  Discharge Goal: Independent    Walk 150 Feet  Assistance Needed: Supervision or touching assistance  Reason if not Attempted: Not attempted due to medical condition or safety concerns  CARE Score: 4  Discharge Goal: Independent    Walking 10 Feet on Uneven Surfaces  Assistance Needed: Supervision or touching assistance  Reason if not Attempted: Not attempted due to medical condition or safety concerns  CARE Score: 4  Discharge Goal: Supervision or touching assistance    1 Step (Curb)  Assistance Needed: Supervision or touching assistance  Reason if not Attempted: Not attempted due to medical condition or safety concerns  CARE Score: 4  Discharge Goal: Independent    4 Steps  Assistance Needed: Supervision or touching assistance  Reason if not Attempted: Not attempted due to medical condition or safety concerns  CARE Score: 4  Discharge Goal: Independent    12 Steps  Assistance Needed: Supervision or touching assistance  Reason if not Attempted: Not attempted due to medical condition or safety concerns  CARE Score: 4  Discharge Goal: Dependent    Wheel 50 Feet with Two

## 2019-08-21 NOTE — PROGRESS NOTES
Patient:   Amna Tinoco  MR#:    [id number]   Room:    0821/821-01   YOB: 1970  Date of Progress Note: 8/21/2019  Time of Note                           8:36 AM  Consulting Physician:   Melissa Morris M.D. Attending Physician:  Melissa Morris MD     CHIEF COMPLAINT: Left-sided weakness with cognitive difficulties        Subjective  This 44 y.o. male  transferred from [facility] [place] to [facility] on 8/8/19 after feeling dizz and developing weakness and numbness in his L side. MRI showed Acute extensive infarct in the right PCA territory. 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 Neurosurgery was consulted. Dr. Jacqueline Carbajal was discussing imaging from aneursym w/colleagues at [facility] for recommendations for treatment. Speech eval revealed a Left visual field cut as well as some mild cognitive deficits. He is tolerating a reg diet w/thin liquids. Slept better with trazadone  REVIEW OF SYSTEMS:  Constitutional: No fevers No chills  Neck:No stiffness  Respiratory: No shortness of breath  Cardiovascular: No chest pain No palpitations  Gastrointestinal: No abdominal pain    Genitourinary: No Dysuria  Neurological: No headache, no confusion      PHYSICAL EXAM:  /75   Pulse 68   Temp 97.7 °F (36.5 °C) (Temporal)   Resp 18   Ht 5' 7\" (1.702 m)   Wt 194 lb 0.4 oz (88 kg)   SpO2 94%   BMI 30.39 kg/m²     Constitutional: he appears well-developed and well-nourished. Eyes - conjunctiva normal.  Pupils react to light  Ear, nose, throat -hearing intact to voice. No scars, masses, or lesions over external nose or ears, no atrophy of tongue  Neck-symmetric, no masses noted, no jugular vein distension  Respiration- chest wall appears symmetric, good expansion,   normal effort without use of accessory muscles  Cardiovascular- RRR  Musculoskeletal - no significant wasting of muscles noted, no bony deformities, gait no gross ataxia  Extremities-no clubbing, cyanosis or edema.   Left hand slightly swollen and tender to movement but no redness or heat. Skin - warm, dry, and intact. No rash, erythema, or pallor. Psychiatric - mood, affect, and behavior appear normal.      Neurology  NEUROLOGICAL EXAM:      Mental status   Awake, alert, fluent oriented x 3 appropriate affect  Attention and concentration appear appropriate  Recent and remote memory appears unremarkable  Speech normal without dysarthria  No clear issues with language       Cranial Nerves   CN II- Visual fields grossly unremarkable  CN III, IV,VI-EOMI, No nystagmus, conjugate eye movements, no ptosis  CN VII-no facial asymmetry  CN VIII-Hearing intact      Motor function  Antigravity x 4, 4/5 left arm and leg     Sensory function Intact to light touch     Cerebellar  left upper extremity ataxia     Tremor None present     Gait                  Not tested           Nursing/pcp notes, imaging,labs and vitals reviewed.      PT,OT and/or speech notes reviewed    Lab Results   Component Value Date    WBC 9.5 08/19/2019    HGB 16.1 08/19/2019    HCT 48.2 08/19/2019    MCV 94.9 (H) 08/19/2019     08/19/2019     Lab Results   Component Value Date     08/19/2019    K 4.0 08/19/2019     08/19/2019    CO2 27 08/19/2019    BUN 15 08/19/2019    CREATININE 0.7 08/19/2019    GLUCOSE 108 08/19/2019    CALCIUM 9.7 08/19/2019    LABGLOM >60 08/19/2019   No results found for: INRNo results found for: PHENYTOIN, ESR, CRP    PHYSICAL THERAPY  STRENGTH  Strength RLE  Strength RLE: WNL  Comment: Grossly 5/5  Strength LLE  Strength LLE: Exception  Comment: Grossly 3+/5   ROM  AROM RLE (degrees)  RLE AROM: WNL  AROM LLE (degrees)  LLE AROM : WFL  BED MOBILITY  Bed Mobility  Bridging: Supervision  Rolling: Modified independent  Supine to Sit: Modified independent  Sit to Supine: Modified independent  Scooting: Modified independent  TRANSFERS  Transfers  Sit to Stand: Modified independent  Stand to sit: Modified independent  Bed to Chair: Modified

## 2019-08-21 NOTE — PLAN OF CARE
maintains weight  Outcome: Ongoing  Goal: Patient/Family demonstrates understanding of diet  Outcome: Ongoing  Goal: Patient will have no more than 5 lb weight change during LOS  Outcome: Ongoing  Goal: Patient will utilize adaptive techniques to administer nutrition  Outcome: Ongoing  Goal: Patient will verbalize dietary restrictions  Outcome: Ongoing     Problem: SAFETY  Goal: LTG - Patient will demonstrate safety requirements appropriate to situation/environment  Outcome: Ongoing  Goal: LTG - patient will utilize safety techniques  Outcome: Ongoing  Goal: STG - patient locks brakes on wheelchair  Outcome: Ongoing  Goal: STG - Patient uses call light consistently to request assistance with transfers  Outcome: Ongoing  Goal: STG - patient uses gait belt during all transfers  Outcome: Ongoing     Problem: SKIN INTEGRITY  Goal: LTG - Patient will be free from infection  Outcome: Ongoing  Goal: LTG - patient will maintain/improve skin integrity through proper skin care techniques  Outcome: Ongoing  Goal: LTG - Patient will demonstrate appropriate pressure relief techniques  Outcome: Ongoing  Goal: LTG - patient will demonstrate appropriate skin care techniques  Outcome: Ongoing  Goal: STG - Patient demonstrates skin care/treatment/dressing change  Outcome: Ongoing     Problem: PAIN  Goal: LTG - Patient will manage pain with the appropriate technique/Intervention  Outcome: Ongoing  Goal: LTG - Patient will demonstrate intervention for managing pain  Outcome: Ongoing  Goal: STG - Patient will reduce or eliminate use of analgesics  Outcome: Ongoing  Goal: STG - pain is manageable through therapies  Outcome: Ongoing  Goal: STG - Patient will verbalize an acceptable level of pain  Outcome: Ongoing  Goal: STG - patients pain is managed to allow active participation in daily activities  Outcome: Ongoing

## 2019-08-28 ENCOUNTER — TELEPHONE (OUTPATIENT)
Dept: NEUROSURGERY | Facility: CLINIC | Age: 49
End: 2019-08-28

## 2019-08-28 ENCOUNTER — TELEPHONE (OUTPATIENT)
Dept: NEUROLOGY | Facility: CLINIC | Age: 49
End: 2019-08-28

## 2019-08-28 NOTE — TELEPHONE ENCOUNTER
Patient's sister & EC, Natasha, called & left a voicemail for me stating the patient doesn't have a follow up with Donald until 9/9/19 and she is concerned b/c he has new symptoms: insomnia and he feels that his hand is on fire.  She states he doesn't have a follow up with neurology or his PCP any time soon either.    I will forward this to Donald & Mookie since I am leaving the office early today.      scar archer CMA

## 2019-08-28 NOTE — TELEPHONE ENCOUNTER
Spoke with Olman when calling back, Natasha was out, explained that the symptoms he is having is not related to his aneurysm, and that they should be followed up by either his PCP or his neurologist.  He did understand but may have Natasha his sister call back as well.  I explained he needs to keep his appointment with our office for 09/09/19.

## 2019-08-28 NOTE — TELEPHONE ENCOUNTER
Natasha said Olman has burning on the bottom and across the top of his toes on his left foot, and burning on his left palm into his fingers.  Appointment scheduled with Emil on Friday.

## 2019-08-28 NOTE — TELEPHONE ENCOUNTER
We just saw him for an aneurysm and neither of his symptoms would be coming from the aneurysm.  He either needs to contact neurology or his primary care doctor in regards to these issues to see about having his appointment moved up.  Thanks

## 2019-08-30 ENCOUNTER — OFFICE VISIT (OUTPATIENT)
Dept: NEUROLOGY | Facility: CLINIC | Age: 49
End: 2019-08-30

## 2019-08-30 VITALS
DIASTOLIC BLOOD PRESSURE: 80 MMHG | HEIGHT: 68 IN | HEART RATE: 83 BPM | SYSTOLIC BLOOD PRESSURE: 120 MMHG | OXYGEN SATURATION: 96 % | BODY MASS INDEX: 31.07 KG/M2 | WEIGHT: 205 LBS

## 2019-08-30 DIAGNOSIS — H53.462 HOMONYMOUS HEMIANOPIA, LEFT: ICD-10-CM

## 2019-08-30 DIAGNOSIS — G89.0 THALAMIC PAIN SYNDROME: ICD-10-CM

## 2019-08-30 DIAGNOSIS — I67.1 BERRY ANEURYSM: ICD-10-CM

## 2019-08-30 DIAGNOSIS — I63.00 CEREBROVASCULAR ACCIDENT (CVA) DUE TO THROMBOSIS OF PRECEREBRAL ARTERY (HCC): Primary | ICD-10-CM

## 2019-08-30 DIAGNOSIS — G81.94 LEFT HEMIPARESIS (HCC): ICD-10-CM

## 2019-08-30 PROCEDURE — 99214 OFFICE O/P EST MOD 30 MIN: CPT | Performed by: PHYSICIAN ASSISTANT

## 2019-08-30 RX ORDER — ALPRAZOLAM 0.25 MG/1
0.25 TABLET ORAL
COMMUNITY
Start: 2019-08-21 | End: 2019-09-22 | Stop reason: HOSPADM

## 2019-08-30 RX ORDER — PANTOPRAZOLE SODIUM 40 MG/1
40 TABLET, DELAYED RELEASE ORAL
COMMUNITY
Start: 2019-08-21

## 2019-08-30 RX ORDER — DULOXETIN HYDROCHLORIDE 30 MG/1
30 CAPSULE, DELAYED RELEASE ORAL DAILY
Qty: 30 CAPSULE | Refills: 3 | Status: SHIPPED | OUTPATIENT
Start: 2019-08-30 | End: 2019-09-25 | Stop reason: SDUPTHER

## 2019-09-09 ENCOUNTER — OFFICE VISIT (OUTPATIENT)
Dept: NEUROSURGERY | Facility: CLINIC | Age: 49
End: 2019-09-09

## 2019-09-09 VITALS
SYSTOLIC BLOOD PRESSURE: 130 MMHG | BODY MASS INDEX: 32.22 KG/M2 | HEIGHT: 68 IN | WEIGHT: 212.6 LBS | DIASTOLIC BLOOD PRESSURE: 84 MMHG

## 2019-09-09 DIAGNOSIS — Z87.891 FORMER SMOKER: ICD-10-CM

## 2019-09-09 DIAGNOSIS — I63.00 CEREBROVASCULAR ACCIDENT (CVA) DUE TO THROMBOSIS OF PRECEREBRAL ARTERY (HCC): ICD-10-CM

## 2019-09-09 DIAGNOSIS — I67.1 BERRY ANEURYSM: Primary | ICD-10-CM

## 2019-09-09 PROCEDURE — 99213 OFFICE O/P EST LOW 20 MIN: CPT | Performed by: NURSE PRACTITIONER

## 2019-09-09 RX ORDER — TRAZODONE HYDROCHLORIDE 100 MG/1
100 TABLET ORAL DAILY
COMMUNITY
Start: 2019-08-21

## 2019-09-09 NOTE — PROGRESS NOTES
"    Chief complaint:   Chief Complaint   Patient presents with   • Cerebral Aneurysm     patient is here for a hospital follow up; patient states he has not heard anything about the referral to [place] for further evaluation.        Subjective     HPI: This is a 49-year-old male gentleman who we saw in the hospital back on August 9, 2019.  The patient did have a right PCA CVA and also was found to have an incidental aneurysm of the right MCA that measured 6 mm x 5 mm.  Dr. Encinas did discuss this patient with neurosurgery and in [place].  He is here in follow-up today.  States overall he feels like he is doing better today than what he was in the hospital.  He continues to work with physical therapy.  His left side does not appear to be making improvements.  He is using a cane to help get around at this time.  Denies any headaches.  Denies any nausea vomiting.  Overall he feels like he is doing well and is happy and satisfied with his recovery.    Review of Systems   Musculoskeletal: Negative.    Neurological: Positive for weakness.   Psychiatric/Behavioral: Negative.          Objective      Vital Signs  /84   Ht 172 cm (67.72\")   Wt 96.4 kg (212 lb 9.6 oz)   BMI 32.60 kg/m²     Physical Exam   Constitutional: He is oriented to person, place, and time. He appears well-developed and well-nourished.   HENT:   Head: Normocephalic.   Eyes: EOM are normal. Pupils are equal, round, and reactive to light.   Neck: Normal range of motion.   Pulmonary/Chest: Effort normal.   Musculoskeletal: Normal range of motion.   Neurological: He is alert and oriented to person, place, and time. He has normal reflexes. No cranial nerve deficit or sensory deficit. Gait abnormal. GCS eye subscore is 4. GCS verbal subscore is 5. GCS motor subscore is 6.   4 out of 5 in muscle groups on the left.  5 out of 5 on the right   Skin: Skin is warm.   Psychiatric: He has a normal mood and affect. His speech is normal and behavior is " normal. Thought content normal.       Results Review: No new imaging        Assessment/Plan: The patient did have a ischemic stroke however his case is complicated by a right MCA aneurysm.  I will discussed this patient in further detail with Dr. Encinas regarding where we are with the referral to [facility] [place] and see if he needs to follow-up with him versus monitoring with us for the time being.  He was told that if he had any worsening symptoms to give us a call.  He can continue with physical therapy at this time.    Patient is a non-smoker  BMI shows the patient is Obese. BMI chart was given to the patient.     Olman was seen today for cerebral aneurysm.    Diagnoses and all orders for this visit:    Schuler aneurysm    Cerebrovascular accident (CVA) due to thrombosis of precerebral artery (CMS/HCC)    Former smoker    BMI 32.0-32.9,adult        I discussed the patients findings and my recommendations with patient  Donald ESTELA Biggs, APRN  09/09/19  11:35 AM

## 2019-09-12 ENCOUNTER — TELEPHONE (OUTPATIENT)
Dept: NEUROSURGERY | Facility: CLINIC | Age: 49
End: 2019-09-12

## 2019-09-12 DIAGNOSIS — I67.1 BERRY ANEURYSM: Primary | ICD-10-CM

## 2019-09-21 ENCOUNTER — APPOINTMENT (OUTPATIENT)
Dept: NEUROLOGY | Facility: HOSPITAL | Age: 49
End: 2019-09-21

## 2019-09-21 ENCOUNTER — HOSPITAL ENCOUNTER (OUTPATIENT)
Facility: HOSPITAL | Age: 49
Setting detail: OBSERVATION
Discharge: HOME | End: 2019-09-22
Attending: EMERGENCY MEDICINE | Admitting: FAMILY MEDICINE

## 2019-09-21 ENCOUNTER — APPOINTMENT (OUTPATIENT)
Dept: GENERAL RADIOLOGY | Facility: HOSPITAL | Age: 49
End: 2019-09-21

## 2019-09-21 ENCOUNTER — APPOINTMENT (OUTPATIENT)
Dept: CT IMAGING | Facility: HOSPITAL | Age: 49
End: 2019-09-21

## 2019-09-21 DIAGNOSIS — I10 ESSENTIAL HYPERTENSION: ICD-10-CM

## 2019-09-21 DIAGNOSIS — Z74.09 IMPAIRED MOBILITY: ICD-10-CM

## 2019-09-21 DIAGNOSIS — Z78.9 DECREASED ACTIVITIES OF DAILY LIVING (ADL): ICD-10-CM

## 2019-09-21 DIAGNOSIS — R27.0 ATAXIA: ICD-10-CM

## 2019-09-21 DIAGNOSIS — G25.3 MYOCLONUS: Primary | ICD-10-CM

## 2019-09-21 PROBLEM — Z86.73 HX OF ARTERIAL ISCHEMIC STROKE: Status: ACTIVE | Noted: 2019-09-21

## 2019-09-21 PROBLEM — Z87.898 FORMER CONSUMPTION OF ALCOHOL: Status: ACTIVE | Noted: 2019-09-21

## 2019-09-21 LAB
ABO GROUP BLD: NORMAL
ALBUMIN SERPL-MCNC: 4.5 G/DL (ref 3.5–5.2)
ALBUMIN/GLOB SERPL: 1.5 G/DL
ALP SERPL-CCNC: 69 U/L (ref 39–117)
ALT SERPL W P-5'-P-CCNC: 34 U/L (ref 1–41)
AMPHET+METHAMPHET UR QL: NEGATIVE
AMPHETAMINES UR QL: NEGATIVE
ANION GAP SERPL CALCULATED.3IONS-SCNC: 14 MMOL/L (ref 5–15)
APTT PPP: 31.6 SECONDS (ref 24.1–35)
AST SERPL-CCNC: 22 U/L (ref 1–40)
BARBITURATES UR QL SCN: NEGATIVE
BASOPHILS # BLD AUTO: 0.05 10*3/MM3 (ref 0–0.2)
BASOPHILS NFR BLD AUTO: 0.6 % (ref 0–1.5)
BENZODIAZ UR QL SCN: POSITIVE
BILIRUB SERPL-MCNC: 0.3 MG/DL (ref 0.2–1.2)
BILIRUB UR QL STRIP: NEGATIVE
BLD GP AB SCN SERPL QL: NEGATIVE
BUN BLD-MCNC: 17 MG/DL (ref 6–20)
BUN/CREAT SERPL: 20.7 (ref 7–25)
BUPRENORPHINE SERPL-MCNC: NEGATIVE NG/ML
CALCIUM SPEC-SCNC: 9.3 MG/DL (ref 8.6–10.5)
CANNABINOIDS SERPL QL: NEGATIVE
CHLORIDE SERPL-SCNC: 101 MMOL/L (ref 98–107)
CLARITY UR: CLEAR
CO2 SERPL-SCNC: 25 MMOL/L (ref 22–29)
COCAINE UR QL: NEGATIVE
COLOR UR: YELLOW
CREAT BLD-MCNC: 0.82 MG/DL (ref 0.76–1.27)
DEPRECATED RDW RBC AUTO: 41.5 FL (ref 37–54)
EOSINOPHIL # BLD AUTO: 0.24 10*3/MM3 (ref 0–0.4)
EOSINOPHIL NFR BLD AUTO: 2.9 % (ref 0.3–6.2)
ERYTHROCYTE [DISTWIDTH] IN BLOOD BY AUTOMATED COUNT: 12.5 % (ref 12.3–15.4)
ETHANOL UR QL: <0.01 %
GFR SERPL CREATININE-BSD FRML MDRD: 99 ML/MIN/1.73
GLOBULIN UR ELPH-MCNC: 3.1 GM/DL
GLUCOSE BLD-MCNC: 103 MG/DL (ref 65–99)
GLUCOSE BLDC GLUCOMTR-MCNC: 97 MG/DL (ref 70–130)
GLUCOSE UR STRIP-MCNC: NEGATIVE MG/DL
HCT VFR BLD AUTO: 44.6 % (ref 37.5–51)
HGB BLD-MCNC: 15.3 G/DL (ref 13–17.7)
HGB UR QL STRIP.AUTO: NEGATIVE
HOLD SPECIMEN: NORMAL
HOLD SPECIMEN: NORMAL
IMM GRANULOCYTES # BLD AUTO: 0.06 10*3/MM3 (ref 0–0.05)
IMM GRANULOCYTES NFR BLD AUTO: 0.7 % (ref 0–0.5)
INR PPP: 0.85 (ref 0.91–1.09)
KETONES UR QL STRIP: NEGATIVE
LEUKOCYTE ESTERASE UR QL STRIP.AUTO: NEGATIVE
LYMPHOCYTES # BLD AUTO: 2.17 10*3/MM3 (ref 0.7–3.1)
LYMPHOCYTES NFR BLD AUTO: 26.3 % (ref 19.6–45.3)
MCH RBC QN AUTO: 30.9 PG (ref 26.6–33)
MCHC RBC AUTO-ENTMCNC: 34.3 G/DL (ref 31.5–35.7)
MCV RBC AUTO: 90.1 FL (ref 79–97)
METHADONE UR QL SCN: NEGATIVE
MONOCYTES # BLD AUTO: 0.89 10*3/MM3 (ref 0.1–0.9)
MONOCYTES NFR BLD AUTO: 10.8 % (ref 5–12)
NEUTROPHILS # BLD AUTO: 4.83 10*3/MM3 (ref 1.7–7)
NEUTROPHILS NFR BLD AUTO: 58.7 % (ref 42.7–76)
NITRITE UR QL STRIP: NEGATIVE
NRBC BLD AUTO-RTO: 0 /100 WBC (ref 0–0.2)
OPIATES UR QL: POSITIVE
OXYCODONE UR QL SCN: NEGATIVE
PCP UR QL SCN: NEGATIVE
PH UR STRIP.AUTO: 6 [PH] (ref 5–8)
PLATELET # BLD AUTO: 252 10*3/MM3 (ref 140–450)
PMV BLD AUTO: 9.3 FL (ref 6–12)
POTASSIUM BLD-SCNC: 4 MMOL/L (ref 3.5–5.2)
PROPOXYPH UR QL: NEGATIVE
PROT SERPL-MCNC: 7.6 G/DL (ref 6–8.5)
PROT UR QL STRIP: NEGATIVE
PROTHROMBIN TIME: 11.9 SECONDS (ref 11.9–14.6)
RBC # BLD AUTO: 4.95 10*6/MM3 (ref 4.14–5.8)
RH BLD: POSITIVE
SODIUM BLD-SCNC: 140 MMOL/L (ref 136–145)
SP GR UR STRIP: 1.01 (ref 1–1.03)
T&S EXPIRATION DATE: NORMAL
TRICYCLICS UR QL SCN: NEGATIVE
TROPONIN T SERPL-MCNC: <0.01 NG/ML (ref 0–0.03)
UROBILINOGEN UR QL STRIP: NORMAL
WBC NRBC COR # BLD: 8.24 10*3/MM3 (ref 3.4–10.8)
WHOLE BLOOD HOLD SPECIMEN: NORMAL
WHOLE BLOOD HOLD SPECIMEN: NORMAL

## 2019-09-21 PROCEDURE — G0378 HOSPITAL OBSERVATION PER HR: HCPCS

## 2019-09-21 PROCEDURE — 96376 TX/PRO/DX INJ SAME DRUG ADON: CPT

## 2019-09-21 PROCEDURE — 71045 X-RAY EXAM CHEST 1 VIEW: CPT

## 2019-09-21 PROCEDURE — 95816 EEG AWAKE AND DROWSY: CPT | Performed by: PSYCHIATRY & NEUROLOGY

## 2019-09-21 PROCEDURE — 25010000002 LORAZEPAM PER 2 MG: Performed by: PSYCHIATRY & NEUROLOGY

## 2019-09-21 PROCEDURE — 82962 GLUCOSE BLOOD TEST: CPT

## 2019-09-21 PROCEDURE — 84484 ASSAY OF TROPONIN QUANT: CPT | Performed by: EMERGENCY MEDICINE

## 2019-09-21 PROCEDURE — 93005 ELECTROCARDIOGRAM TRACING: CPT | Performed by: EMERGENCY MEDICINE

## 2019-09-21 PROCEDURE — 99285 EMERGENCY DEPT VISIT HI MDM: CPT

## 2019-09-21 PROCEDURE — 85730 THROMBOPLASTIN TIME PARTIAL: CPT | Performed by: EMERGENCY MEDICINE

## 2019-09-21 PROCEDURE — 70450 CT HEAD/BRAIN W/O DYE: CPT

## 2019-09-21 PROCEDURE — 86850 RBC ANTIBODY SCREEN: CPT | Performed by: EMERGENCY MEDICINE

## 2019-09-21 PROCEDURE — 25010000002 LORAZEPAM PER 2 MG: Performed by: EMERGENCY MEDICINE

## 2019-09-21 PROCEDURE — 80307 DRUG TEST PRSMV CHEM ANLYZR: CPT | Performed by: FAMILY MEDICINE

## 2019-09-21 PROCEDURE — 85025 COMPLETE CBC W/AUTO DIFF WBC: CPT | Performed by: EMERGENCY MEDICINE

## 2019-09-21 PROCEDURE — 93010 ELECTROCARDIOGRAM REPORT: CPT | Performed by: INTERNAL MEDICINE

## 2019-09-21 PROCEDURE — 96374 THER/PROPH/DIAG INJ IV PUSH: CPT

## 2019-09-21 PROCEDURE — 85610 PROTHROMBIN TIME: CPT | Performed by: EMERGENCY MEDICINE

## 2019-09-21 PROCEDURE — 99284 EMERGENCY DEPT VISIT MOD MDM: CPT

## 2019-09-21 PROCEDURE — 86900 BLOOD TYPING SEROLOGIC ABO: CPT | Performed by: EMERGENCY MEDICINE

## 2019-09-21 PROCEDURE — 86901 BLOOD TYPING SEROLOGIC RH(D): CPT | Performed by: EMERGENCY MEDICINE

## 2019-09-21 PROCEDURE — 80306 DRUG TEST PRSMV INSTRMNT: CPT | Performed by: PSYCHIATRY & NEUROLOGY

## 2019-09-21 PROCEDURE — 81003 URINALYSIS AUTO W/O SCOPE: CPT | Performed by: FAMILY MEDICINE

## 2019-09-21 PROCEDURE — 80053 COMPREHEN METABOLIC PANEL: CPT | Performed by: EMERGENCY MEDICINE

## 2019-09-21 PROCEDURE — 99283 EMERGENCY DEPT VISIT LOW MDM: CPT | Performed by: PSYCHIATRY & NEUROLOGY

## 2019-09-21 PROCEDURE — 96375 TX/PRO/DX INJ NEW DRUG ADDON: CPT

## 2019-09-21 PROCEDURE — 95816 EEG AWAKE AND DROWSY: CPT

## 2019-09-21 RX ORDER — LABETALOL HYDROCHLORIDE 5 MG/ML
10 INJECTION, SOLUTION INTRAVENOUS ONCE
Status: COMPLETED | OUTPATIENT
Start: 2019-09-21 | End: 2019-09-21

## 2019-09-21 RX ORDER — AMLODIPINE BESYLATE 10 MG/1
10 TABLET ORAL
Status: DISCONTINUED | OUTPATIENT
Start: 2019-09-21 | End: 2019-09-22 | Stop reason: HOSPADM

## 2019-09-21 RX ORDER — SODIUM CHLORIDE 0.9 % (FLUSH) 0.9 %
10 SYRINGE (ML) INJECTION AS NEEDED
Status: DISCONTINUED | OUTPATIENT
Start: 2019-09-21 | End: 2019-09-22 | Stop reason: HOSPADM

## 2019-09-21 RX ORDER — ATORVASTATIN CALCIUM 40 MG/1
80 TABLET, FILM COATED ORAL NIGHTLY
Status: DISCONTINUED | OUTPATIENT
Start: 2019-09-21 | End: 2019-09-22

## 2019-09-21 RX ORDER — LABETALOL HYDROCHLORIDE 5 MG/ML
20 INJECTION, SOLUTION INTRAVENOUS ONCE
Status: DISCONTINUED | OUTPATIENT
Start: 2019-09-21 | End: 2019-09-21

## 2019-09-21 RX ORDER — BACLOFEN 10 MG/1
10 TABLET ORAL 3 TIMES DAILY PRN
Status: DISCONTINUED | OUTPATIENT
Start: 2019-09-21 | End: 2019-09-22

## 2019-09-21 RX ORDER — LORAZEPAM 2 MG/ML
1 INJECTION INTRAMUSCULAR ONCE
Status: COMPLETED | OUTPATIENT
Start: 2019-09-21 | End: 2019-09-21

## 2019-09-21 RX ORDER — TRAZODONE HYDROCHLORIDE 100 MG/1
100 TABLET ORAL DAILY
Status: DISCONTINUED | OUTPATIENT
Start: 2019-09-21 | End: 2019-09-22 | Stop reason: HOSPADM

## 2019-09-21 RX ORDER — TERAZOSIN 5 MG/1
5 CAPSULE ORAL DAILY
Status: DISCONTINUED | OUTPATIENT
Start: 2019-09-21 | End: 2019-09-22 | Stop reason: HOSPADM

## 2019-09-21 RX ORDER — ASPIRIN 81 MG/1
81 TABLET, CHEWABLE ORAL DAILY
Status: DISCONTINUED | OUTPATIENT
Start: 2019-09-21 | End: 2019-09-22 | Stop reason: HOSPADM

## 2019-09-21 RX ORDER — BACLOFEN 10 MG/1
10 TABLET ORAL ONCE
Status: COMPLETED | OUTPATIENT
Start: 2019-09-21 | End: 2019-09-21

## 2019-09-21 RX ORDER — DULOXETIN HYDROCHLORIDE 30 MG/1
30 CAPSULE, DELAYED RELEASE ORAL DAILY
Status: DISCONTINUED | OUTPATIENT
Start: 2019-09-21 | End: 2019-09-22 | Stop reason: HOSPADM

## 2019-09-21 RX ORDER — PANTOPRAZOLE SODIUM 40 MG/1
40 TABLET, DELAYED RELEASE ORAL DAILY
Status: DISCONTINUED | OUTPATIENT
Start: 2019-09-21 | End: 2019-09-22 | Stop reason: HOSPADM

## 2019-09-21 RX ORDER — SODIUM CHLORIDE 0.9 % (FLUSH) 0.9 %
10 SYRINGE (ML) INJECTION AS NEEDED
Status: DISCONTINUED | OUTPATIENT
Start: 2019-09-21 | End: 2019-09-21

## 2019-09-21 RX ORDER — SODIUM CHLORIDE 0.9 % (FLUSH) 0.9 %
10 SYRINGE (ML) INJECTION EVERY 12 HOURS SCHEDULED
Status: DISCONTINUED | OUTPATIENT
Start: 2019-09-21 | End: 2019-09-22 | Stop reason: HOSPADM

## 2019-09-21 RX ADMIN — BACLOFEN 10 MG: 10 TABLET ORAL at 17:32

## 2019-09-21 RX ADMIN — DULOXETINE HYDROCHLORIDE 30 MG: 30 CAPSULE, DELAYED RELEASE ORAL at 20:58

## 2019-09-21 RX ADMIN — LORAZEPAM 1 MG: 2 INJECTION INTRAMUSCULAR; INTRAVENOUS at 17:14

## 2019-09-21 RX ADMIN — TERAZOSIN HYDROCHLORIDE 5 MG: 5 CAPSULE ORAL at 20:58

## 2019-09-21 RX ADMIN — PANTOPRAZOLE SODIUM 40 MG: 40 TABLET, DELAYED RELEASE ORAL at 20:58

## 2019-09-21 RX ADMIN — ATORVASTATIN CALCIUM 80 MG: 40 TABLET, FILM COATED ORAL at 20:58

## 2019-09-21 RX ADMIN — TRAZODONE HYDROCHLORIDE 100 MG: 100 TABLET ORAL at 20:58

## 2019-09-21 RX ADMIN — AMLODIPINE BESYLATE 10 MG: 10 TABLET ORAL at 20:58

## 2019-09-21 RX ADMIN — SODIUM CHLORIDE, PRESERVATIVE FREE 10 ML: 5 INJECTION INTRAVENOUS at 20:59

## 2019-09-21 RX ADMIN — LABETALOL HYDROCHLORIDE 10 MG: 5 INJECTION, SOLUTION INTRAVENOUS at 12:56

## 2019-09-21 RX ADMIN — ASPIRIN 81 MG 81 MG: 81 TABLET ORAL at 20:58

## 2019-09-21 RX ADMIN — LORAZEPAM 1 MG: 2 INJECTION INTRAMUSCULAR; INTRAVENOUS at 12:47

## 2019-09-21 NOTE — H&P
[facility] Medicine Services  HISTORY AND PHYSICAL    Date of Admission: 9/21/2019  Primary Care Physician: Cassandra Cortes APRN    Subjective     Chief Complaint: Myoclonus    History of Present Illness  Patient is a 49-year-old  male presented ER with complaining of possible seizure.  Symptoms started yesterday causing jerking motion in his left upper and lower extremities.  Myoclonic-like motor motion movement of the left arm and legs.  Patient went to [facility] yesterday and was admitted overnight  for the same reason.  Patient was released 9 AM this morning.  Patient received Ativan there.  Patient came here today with the same complaints.  Patient received Ativan x2 already with baclofen.  Patient stated neurology was unable to do read the EEG because patient has Ativan.  After that patient also got a dose of Ativan again and baclofen from the neurology.     Patient has had a recent stroke about August 8 2019 on the right PCA/the thalamic stroke with left sided weakness and a right PCA stroke.  Patient also history of right MCA aneurysm.   Patient also had alcohol abuse.  Patient stated last time he had a drink was 2 months ago.  Patient also admitting to using marijuana 2 months ago.  Patient also stopped smoking 2 months ago per patient.    Patient went after to [facility] rehab for week.  Patient has been doing physical therapy outpatient after.  Patient being walking around the cane since then.    Review of Systems   Constitutional: Positive for activity change, appetite change and fatigue. Negative for chills and fever.   HENT: Negative for hearing loss, nosebleeds, tinnitus and trouble swallowing.    Eyes: Negative for visual disturbance.   Respiratory: Negative for cough, chest tightness, shortness of breath and wheezing.    Cardiovascular: Negative for chest pain, palpitations and leg swelling.   Gastrointestinal: Positive for nausea. Negative for  abdominal distention, abdominal pain, blood in stool, constipation, diarrhea and vomiting.   Endocrine: Negative for cold intolerance, heat intolerance, polydipsia, polyphagia and polyuria.   Genitourinary: Negative for decreased urine volume, difficulty urinating, dysuria, flank pain, frequency and hematuria.   Musculoskeletal: Positive for arthralgias, gait problem and myalgias. Negative for joint swelling.   Skin: Negative for rash.   Allergic/Immunologic: Negative for immunocompromised state.   Neurological: Positive for weakness. Negative for dizziness, syncope, light-headedness and headaches.   Hematological: Negative for adenopathy. Does not bruise/bleed easily.   Psychiatric/Behavioral: Negative for confusion and sleep disturbance. The patient is not nervous/anxious.         Otherwise complete ROS reviewed and negative except as mentioned in the HPI.      Past Medical History:   Past Medical History:   Diagnosis Date   • Benign essential HTN 8/8/2019   • HLD (hyperlipidemia) 8/8/2019       Past Surgical History:  Past Surgical History:   Procedure Laterality Date   • FEMORAL LYMPH NODE BIOPSY/EXCISON     • MOUTH SURGERY         Family History: family history includes Cancer in his mother; Hypertension in his mother; No Known Problems in his father; Stroke in his mother.    Social History:  reports that he has quit smoking. His smoking use included cigarettes. He has a 42.00 pack-year smoking history. He has never used smokeless tobacco. He reports that he uses drugs. Drug: Marijuana. Frequency: 7.00 times per week. He reports that he does not drink alcohol.    Medications:  Prior to Admission medications    Medication Sig Start Date End Date Taking? Authorizing Provider   ALPRAZolam (XANAX) 0.25 MG tablet Take 0.25 mg by mouth. 8/21/19 9/20/19  Provider, MD Laurel   amLODIPine (NORVASC) 10 MG tablet Take 1 tablet by mouth Daily. 8/14/19   Tay Collado, DO   aspirin 81 MG chewable tablet Chew 1  "tablet Daily. 8/14/19   Tay Collado DO   atorvastatin (LIPITOR) 80 MG tablet Take 1 tablet by mouth Every Night. 8/13/19   Tay Collado DO   DULoxetine (CYMBALTA) 30 MG capsule Take 1 capsule by mouth Daily. 8/30/19   Noe Sumner PA   nicotine (NICODERM CQ) 7 MG/24HR patch  9/3/19   Laurel Feliciano MD   pantoprazole (PROTONIX) 40 MG EC tablet Take 40 mg by mouth. 8/21/19   Laurel Feliciano MD   sennosides-docusate sodium (SENOKOT-S) 8.6-50 MG tablet Take 2 tablets by mouth 2 (Two) Times a Day. 8/13/19   Tay Collado DO   terazosin (HYTRIN) 5 MG capsule Take 1 capsule by mouth Daily. 8/14/19   Tay Collado DO   traZODone (DESYREL) 100 MG tablet Take 100 mg by mouth Daily. 8/21/19   Laurel Feliciano MD     Allergies:  No Known Allergies    Objective     Vital Signs: /95 (BP Location: Right arm, Patient Position: Lying)   Pulse 70   Temp 98.4 °F (36.9 °C) (Oral)   Resp 16   Ht 170.2 cm (67\")   Wt 98.9 kg (218 lb)   SpO2 93%   BMI 34.14 kg/m²   Physical Exam   Constitutional: He is oriented to person, place, and time. He appears well-developed.   HENT:   Head: Normocephalic.   Eyes: Conjunctivae are normal. Pupils are equal, round, and reactive to light.   Neck: Neck supple. No JVD present. No thyromegaly present.   Cardiovascular: Normal rate, regular rhythm, normal heart sounds and intact distal pulses. Exam reveals no gallop and no friction rub.   No murmur heard.  Pulmonary/Chest: Effort normal and breath sounds normal. No respiratory distress. He has no wheezes. He has no rales. He exhibits no tenderness.   Abdominal: Soft. Bowel sounds are normal. He exhibits no distension. There is no tenderness. There is no rebound and no guarding.   Chronic left abdomen pain due to muscle tear from the past.  Obesity.   Musculoskeletal: He exhibits edema. He exhibits no tenderness or deformity.   Left-sided weakness/abnormal coordination.  Pitting edema " 1+ lower extremities.   Lymphadenopathy:     He has no cervical adenopathy.   Neurological: He is alert and oriented to person, place, and time. He displays normal reflexes. No cranial nerve deficit. He exhibits abnormal muscle tone. Coordination abnormal.   Skin: Skin is warm and dry. Capillary refill takes 2 to 3 seconds. No rash noted.   Psychiatric: He has a normal mood and affect. His behavior is normal. Judgment and thought content normal.   Nursing note and vitals reviewed.          Results Reviewed:    Lab Results (last 24 hours)     Procedure Component Value Units Date/Time    Ethanol [[id number]] Collected:  09/21/19 1235    Specimen:  Blood Updated:  09/21/19 1822     Ethanol % <0.010 %     Narrative:       Not for legal purposes. Chain of Custody not followed.     Urinalysis With Culture If Indicated - Urine, Clean Catch [[id number]]  (Normal) Collected:  09/21/19 1741    Specimen:  Urine, Clean Catch Updated:  09/21/19 1814     Color, UA Yellow     Appearance, UA Clear     pH, UA 6.0     Specific Gravity, UA 1.011     Glucose, UA Negative     Ketones, UA Negative     Bilirubin, UA Negative     Blood, UA Negative     Protein, UA Negative     Leuk Esterase, UA Negative     Nitrite, UA Negative     Urobilinogen, UA 0.2 E.U./dL    Narrative:       Urine microscopic not indicated.    Urine Drug Screen - Urine, Clean Catch [[id number]]  (Abnormal) Collected:  09/21/19 1741    Specimen:  Urine, Clean Catch Updated:  09/21/19 1803     THC, Screen, Urine Negative     Phencyclidine (PCP), Urine Negative     Cocaine Screen, Urine Negative     Methamphetamine, Ur Negative     Opiate Screen Positive     Amphetamine Screen, Urine Negative     Benzodiazepine Screen, Urine Positive     Tricyclic Antidepressants Screen Negative     Methadone Screen, Urine Negative     Barbiturates Screen, Urine Negative     Oxycodone Screen, Urine Negative     Propoxyphene Screen Negative     Buprenorphine, Screen, Urine Negative     Narrative:       Cutoff For Drugs Screened:    Amphetamines               500 ng/ml  Barbiturates               200 ng/ml  Benzodiazepines            150 ng/ml  Cocaine                    150 ng/ml  Methadone                  200 ng/ml  Opiates                    100 ng/ml  Phencyclidine               25 ng/ml  THC                            50 ng/ml  Methamphetamine            500 ng/ml  Tricyclic Antidepressants  300 ng/ml  Oxycodone                  100 ng/ml  Propoxyphene               300 ng/ml  Buprenorphine               10 ng/ml    The normal value for all drugs tested is negative. This report includes unconfirmed screening results, with the cutoff values listed, to be used for medical treatment purposes only.  Unconfirmed results must not be used for non-medical purposes such as employment or legal testing.  Clinical consideration should be applied to any drug of abuse test, particularly when unconfirmed results are used.      [place] Draw [[id number]] Collected:  09/21/19 1235    Specimen:  Blood Updated:  09/21/19 1345    Narrative:       The following orders were created for panel order [place] Draw.  Procedure                               Abnormality         Status                     ---------                               -----------         ------                     Light Blue Top[[id number]]                                   Final result               Green Top (Gel)[[id number]]                                  Final result               Lavender Top[[id number]]                                     Final result               Red Top[[id number]]                                          Final result                 Please view results for these tests on the individual orders.    Light Blue Top [[id number]] Collected:  09/21/19 1235    Specimen:  Blood Updated:  09/21/19 1345     Extra Tube hold for add-on     Comment: Auto resulted       Green Top (Gel) [[id number]] Collected:  09/21/19 1235    Specimen:   Blood Updated:  09/21/19 1345     Extra Tube Hold for add-ons.     Comment: Auto resulted.       Lavender Top [[id number]] Collected:  09/21/19 1235    Specimen:  Blood Updated:  09/21/19 1345     Extra Tube hold for add-on     Comment: Auto resulted       Red Top [[id number]] Collected:  09/21/19 1235    Specimen:  Blood Updated:  09/21/19 1345     Extra Tube Hold for add-ons.     Comment: Auto resulted.       POC Glucose Once [[id number]]  (Normal) Collected:  09/21/19 1310    Specimen:  Blood Updated:  09/21/19 1322     Glucose 97 mg/dL      Comment: : [id number] Vahid RogersineMeter ID: [id number]       Comprehensive Metabolic Panel [[id number]]  (Abnormal) Collected:  09/21/19 1235    Specimen:  Blood Updated:  09/21/19 1320     Glucose 103 mg/dL      BUN 17 mg/dL      Creatinine 0.82 mg/dL      Sodium 140 mmol/L      Potassium 4.0 mmol/L      Chloride 101 mmol/L      CO2 25.0 mmol/L      Calcium 9.3 mg/dL      Total Protein 7.6 g/dL      Albumin 4.50 g/dL      ALT (SGPT) 34 U/L      AST (SGOT) 22 U/L      Alkaline Phosphatase 69 U/L      Total Bilirubin 0.3 mg/dL      eGFR Non African Amer 99 mL/min/1.73      Globulin 3.1 gm/dL      A/G Ratio 1.5 g/dL      BUN/Creatinine Ratio 20.7     Anion Gap 14.0 mmol/L     Narrative:       GFR Normal >60  Chronic Kidney Disease <60  Kidney Failure <15    Troponin [[id number]]  (Normal) Collected:  09/21/19 1235    Specimen:  Blood Updated:  09/21/19 1320     Troponin T <0.010 ng/mL     Narrative:       Troponin T Reference Range:  <= 0.03 ng/mL-   Negative for AMI  >0.03 ng/mL-     Abnormal for myocardial necrosis.  Clinicians would have to utilize clinical acumen, EKG, Troponin and serial changes to determine if it is an Acute Myocardial Infarction or myocardial injury due to an underlying chronic condition.     Protime-INR [[id number]]  (Abnormal) Collected:  09/21/19 1235    Specimen:  Blood Updated:  09/21/19 1310     Protime 11.9 Seconds      INR 0.85    aPTT  [[id number]]  (Normal) Collected:  09/21/19 1235    Specimen:  Blood Updated:  09/21/19 1310     PTT 31.6 seconds     CBC & Differential [[id number]] Collected:  09/21/19 1235    Specimen:  Blood Updated:  09/21/19 1301    Narrative:       The following orders were created for panel order CBC & Differential.  Procedure                               Abnormality         Status                     ---------                               -----------         ------                     CBC Auto Differential[[id number]]        Abnormal            Final result                 Please view results for these tests on the individual orders.    CBC Auto Differential [[id number]]  (Abnormal) Collected:  09/21/19 1235    Specimen:  Blood Updated:  09/21/19 1301     WBC 8.24 10*3/mm3      RBC 4.95 10*6/mm3      Hemoglobin 15.3 g/dL      Hematocrit 44.6 %      MCV 90.1 fL      MCH 30.9 pg      MCHC 34.3 g/dL      RDW 12.5 %      RDW-SD 41.5 fl      MPV 9.3 fL      Platelets 252 10*3/mm3      Neutrophil % 58.7 %      Lymphocyte % 26.3 %      Monocyte % 10.8 %      Eosinophil % 2.9 %      Basophil % 0.6 %      Immature Grans % 0.7 %      Neutrophils, Absolute 4.83 10*3/mm3      Lymphocytes, Absolute 2.17 10*3/mm3      Monocytes, Absolute 0.89 10*3/mm3      Eosinophils, Absolute 0.24 10*3/mm3      Basophils, Absolute 0.05 10*3/mm3      Immature Grans, Absolute 0.06 10*3/mm3      nRBC 0.0 /100 WBC            Radiology Data:    Imaging Results (last 24 hours)     Procedure Component Value Units Date/Time    CT Head Without Contrast Stroke Protocol [[id number]] Collected:  09/21/19 1339     Updated:  09/21/19 1343    Narrative:       CT HEAD WO CONTRAST STROKE PROTOCOL- 9/21/2019 1:21 PM CDT     HISTORY: Stroke, left-sided numbness.     COMPARISON: 08/08/2019      DOSE LENGTH PRODUCT: 989 mGy cm. Automated exposure control was also  utilized to decrease patient radiation dose.     TECHNIQUE: Helical tomographic images of the brain were  obtained without  the use of intravenous contrast.      FINDINGS:   Evolution of the previous RIGHT PCA territory infarct is noted. There is  no evidence of acute hemorrhage. There is no hydrocephalus or abnormal  extra-axial fluid collection. There is volume loss in the medial aspect  of the RIGHT temporal lobe, consistent with evolution of the previously  seen infarct.     The bones are intact. The orbits and their contents are unremarkable.  The visualized sinuses are clear.       Impression:       1. No acute intracranial hemorrhage.  2. Evolution of the previously seen RIGHT PCA territory infarct.  This report was finalized on 09/21/2019 13:40 by Dr. Anant Tilley MD.    XR Chest 1 View [[id number]] Collected:  09/21/19 1306     Updated:  09/21/19 1309    Narrative:       XR CHEST 1 VW- 9/21/2019 12:58 PM CDT     HISTORY: Acute Stroke Protocol (onset < 12 hrs)       COMPARISON: None.     FINDINGS:   The lungs are clear. The cardiomediastinal silhouette and pulmonary  vascularity are within normal limits.      The osseous structures and surrounding soft tissues demonstrate no acute  abnormality.       Impression:       1. No radiographic evidence of acute cardiopulmonary process.        This report was finalized on 09/21/2019 13:06 by Dr. Anant Tilley MD.          I have personally reviewed and interpreted the radiology studies and ECG obtained at time of admission.     Assessment / Plan      Assessment & Plan  Active Hospital Problems    Diagnosis   • Myoclonus   • Hx of arterial ischemic stroke   • Former consumption of alcohol   • Former smoker   • HLD (hyperlipidemia)     Plans    Myoclonus.  Consult neurology.  This does not think this is seizure activity.  Patient no acute distress.  Patient talking during the whole episode.  CT scan of the head - no acute intracranial hemorrhage, evolution of the previously seen RIGHT PCA territory infarct.  Chest x-ray-no radiographic evidence of acute cardiopulmonary  process.    Hypertension/hyperlipidemia.  Continue Norvasc.  Continue Lipitor.    History of CVA.  Plan for another MRI of the head.  Continue aspirin.    Depression.  Continue Cymbalta.  Reflux.  Continue Protonix.  Continue trazodone.    Prostate hypertrophy.  Continue Hytrin.    History of alcohol and drug use.  Last use was 2 months ago per patient.  UDS showed positive for benzo and opiate.    Code Status: full     I discussed the patient's findings and my recommendations with: patient    Estimated length of stay: 1-3 days.     Kun Hernandes MD   09/21/19   6:50 PM

## 2019-09-21 NOTE — CONSULTS
Neurology Consult Note    Patient:  Olman Vaca   YOB: 1970  MRN:  [id number]  Date of Admission:  9/21/2019 12:20 PM    Date: 9/21/2019    Referring Provider: Damien Downing MD  Reason for Consultation: Stroke      History of present illness:     This is a 49 y.o. right handed male with H/O hypertension, hyperlipidemia, smoker and etoh drinker (none since stroke) recent right PCA/thalamic stroke with left HH and occlusion of the right PCA (P2 ) stroke, alcoholic  right MCA aneurysm, evaluate for left myoclonus of arm and leg    Patient had a stroke in August 8 2019 which presented with left face, left arm, left leg, left trunk numbness, posterior headache, lightheadedness, and incoordination of the left arm and gait incoordination and left HH.   Patient was discharge in August 13 2019  to inpatient rehab at  [facility] and was there about a week and discharged to home and went through outpatient therapy at [facility] rehab.  Thursday that is 2 days ago was his last day.  At time of discharge from home he reportedly had no facial weakness and some mild weakness on his left   He was discharged with a 10 day supply of xanax.     Yesterday he noted myoclonic movement in his left arm and leg and went to [facility] where he was hospitalized overnight and discharged at 9 AM to day. He received Ativan last night and this morning and ED provider gave ativan around 1 PM (before EEG)     Today his has some left facial weakness and     Work up for stroke in August 2019:  1. MRI of brain without:     Acute extensive infarct in the right PCA territory.  2. CTA of head    1.. Attenuation suggesting steno-occlusive disease of the P2 segment of the right posterior cerebral artery. There is mild calcific plaquing involving the proximal intracranial aspect of the right vertebral artery. The posterior circulation is otherwise unremarkable.  2. 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 There is a hypoplastic A1 segment of the right anterior cerebral artery. The anterior circulation is otherwise unremarkable.  3. The dural venous sinuses are unremarkable with no evidence of dural venous sinus thrombosis  3. CTA of neck:  .1. There is mild calcific plaquing and noncalcific plaque involving the carotid bulb and proximal aspect of both internal carotid arteries. This is not resulting in a hemodynamically significant stenosis with a less than 20% cross-sectional narrowing noted of both ICAs. The more distal ICAs are tortuous but otherwise widely patent.  2. Minimal calcific plaquing involving the proximal right innominate.The origin of the great vessels are otherwise unremarkable.  3. Right vertebral artery is dominant. Both vertebral arteries are widely patent in their extracranial aspect. There is some mild calcific plaquing with mild associated stenosis of the proximal intracranial aspect of the right vertebral artery.  4. There is a polypoid lesion which appears to be associated with the left vocal fold. This would warrant follow-up with direct visualization following ENT consultation.  4. TTE with bubble  Results for orders placed during the hospital encounter of 08/08/19   Adult Transthoracic Echo Complete W/ Cont if Necessary Per Protocol (With Agitated Saline)    Narrative · Left ventricular wall thickness is consistent with moderate concentric   hypertrophy.  · Estimated EF = 55%.  · Left ventricular diastolic dysfunction.  · Left atrial cavity size is mild-to-moderately dilated.  · No evidence of a patent foramen ovale. Saline test results are negative.  · No evidence of pulmonary hypertension is present.        5. Lipid panel:  Ref Range & Units 1mo ago   Total Cholesterol  130 - 200 mg/dL 207 Abnormally high     Triglycerides  0 - 149 mg/dL 161 Abnormally high     HDL Cholesterol  >=40 mg/dL 38 Abnormally low     LDL Cholesterol   0 - 99 mg/dL 133 Abnormally high     LDL/HDL  Ratio 3.60      6. Hemoglobin A1C of 5.7 performed 8/9/19      Patient went through PT and released    He was admitted to for 1 day and given xanax.   Past Medical History:   Diagnosis Date   • Benign essential HTN 8/8/2019   • HLD (hyperlipidemia) 8/8/2019       Past Surgical History:   Procedure Laterality Date   • FEMORAL LYMPH NODE BIOPSY/EXCISON     • MOUTH SURGERY         Prior to Admission medications    Medication Sig Start Date End Date Taking? Authorizing Provider   ALPRAZolam (XANAX) 0.25 MG tablet Take 0.25 mg by mouth. 8/21/19 9/20/19  Laurel Feliciano MD   amLODIPine (NORVASC) 10 MG tablet Take 1 tablet by mouth Daily. 8/14/19   Tay Collado DO   aspirin 81 MG chewable tablet Chew 1 tablet Daily. 8/14/19   Tay Collado DO   atorvastatin (LIPITOR) 80 MG tablet Take 1 tablet by mouth Every Night. 8/13/19   Tay Collado DO   DULoxetine (CYMBALTA) 30 MG capsule Take 1 capsule by mouth Daily. 8/30/19   Noe Sumner PA   nicotine (NICODERM CQ) 7 MG/24HR patch  9/3/19   Laurel Feliciano MD   pantoprazole (PROTONIX) 40 MG EC tablet Take 40 mg by mouth. 8/21/19   Laurel Feliciano MD   sennosides-docusate sodium (SENOKOT-S) 8.6-50 MG tablet Take 2 tablets by mouth 2 (Two) Times a Day. 8/13/19   Tay Collado DO   terazosin (HYTRIN) 5 MG capsule Take 1 capsule by mouth Daily. 8/14/19   Tay Collado DO   traZODone (DESYREL) 100 MG tablet Take 100 mg by mouth Daily. 8/21/19   ProviderLaurel MD       Hospital scheduled medications:      Hospital PRN medications:  •  sodium chloride    No Known Allergies    Social History     Socioeconomic History   • Marital status: Single     Spouse name: Not on file   • Number of children: Not on file   • Years of education: Not on file   • Highest education level: Not on file   Tobacco Use   • Smoking status: Former Smoker     Packs/day: 1.00     Years: 42.00     Pack years: 42.00     Types: Cigarettes   •  Smokeless tobacco: Never Used   • Tobacco comment: pt quit a month ago   Substance and Sexual Activity   • Alcohol use: No     Alcohol/week: 42.0 oz     Types: 70 Shots of liquor per week     Frequency: Never   • Drug use: Yes     Frequency: 7.0 times per week     Types: Marijuana     Comment: pt states he quit   • Sexual activity: Not Currently     Family History   Problem Relation Age of Onset   • Hypertension Mother    • Cancer Mother    • Stroke Mother    • No Known Problems Father        Review of Systems  A 14 point review of systems was reviewed and was negative except for spasm    Vital Signs   Temp:  [97.6 °F (36.4 °C)] 97.6 °F (36.4 °C)  Heart Rate:  [63-74] 68  Resp:  [18] 18  BP: (134-185)/() 134/86    General Exam:  Head:  Normal cephalic, atraumatic  HEENT:  Neck supple  Fundoscopic Exam:  No signs of disc edema  CVS:  Regular rate and rhythm.  No murmurs  Carotid Examination:  No bruits  Lungs:  Clear to auscultation  Abdomen:  Non-tender, Non-distended  Extremities:  No signs of peripheral edema  Skin:  No rashes    Neurologic Exam:    Mental Status:    -Awake, Alert, Oriented X 3  -No word finding difficulties  -No aphasia  -No dysarthria  -Follows simple and complex commands    CN II:  Visual fields full.  Pupils equally reactive to light  CN III, IV, VI:  Extraocular Muscles full with no signs of nystagmus  CN V:  Facial sensory is symmetric   CN VII:  Facial motor symmetric  CN VIII:  Gross hearing intact bilaterally  CN IX/X:  Palate elevates symmetrically  CN XI:  Shoulder shrug symmetric  CN XII:  Tongue is midline on protrusion    Motor: (strength out of 5:  1= minimal movement, 2 = movement in plane of gravity, 3 = movement against gravity, 4 = movement against some resistance, 5 = full strength)    -Right Upper Ext: Proximal: 5 Distal: 5  -Left Upper Ext:appears weak but continues to have myoclonic spasm  -Right Lower Ext: Proximal: 5 Distal: 5  -Left Lower Ext: Proximal: 5 Distal:  5    DTR:  -Right   Bicep: 2+ Triceps: 2+ Brachioradialis: 2+   Patella: 2+ Ankle: 2+ Neg Babinski  -Left   Bicep: 2+ Triceps: 2+ Brachioradialis: 2+   Patella: 2+ Ankle: 2+ Neg Babinski    Sensory:  -Intact to light touch, pinprick, temperature, pain, and proprioception    Coordination:  -Finger to nose intact  -Heel to shin intact  -No ataxia    Gait  -No signs of ataxia  -ambulates unassisted      Results Review:  Lab Results (last 7 days)     Procedure Component Value Units Date/Time    [place] Draw [[id number]] Collected:  09/21/19 1235    Specimen:  Blood Updated:  09/21/19 1345    Narrative:       The following orders were created for panel order [place] Draw.  Procedure                               Abnormality         Status                     ---------                               -----------         ------                     Light Blue Top[[id number]]                                   Final result               Green Top (Gel)[[id number]]                                  Final result               Lavender Top[[id number]]                                     Final result               Red Top[[id number]]                                          Final result                 Please view results for these tests on the individual orders.    Light Blue Top [[id number]] Collected:  09/21/19 1235    Specimen:  Blood Updated:  09/21/19 1345     Extra Tube hold for add-on     Comment: Auto resulted       Green Top (Gel) [[id number]] Collected:  09/21/19 1235    Specimen:  Blood Updated:  09/21/19 1345     Extra Tube Hold for add-ons.     Comment: Auto resulted.       Lavender Top [[id number]] Collected:  09/21/19 1235    Specimen:  Blood Updated:  09/21/19 1345     Extra Tube hold for add-on     Comment: Auto resulted       Red Top [[id number]] Collected:  09/21/19 1235    Specimen:  Blood Updated:  09/21/19 1345     Extra Tube Hold for add-ons.     Comment: Auto resulted.       POC Glucose Once [[id number]]  (Normal)  Collected:  09/21/19 1310    Specimen:  Blood Updated:  09/21/19 1322     Glucose 97 mg/dL      Comment: : [id number] Vahid Verdugo ID: [id number]       Comprehensive Metabolic Panel [[id number]]  (Abnormal) Collected:  09/21/19 1235    Specimen:  Blood Updated:  09/21/19 1320     Glucose 103 mg/dL      BUN 17 mg/dL      Creatinine 0.82 mg/dL      Sodium 140 mmol/L      Potassium 4.0 mmol/L      Chloride 101 mmol/L      CO2 25.0 mmol/L      Calcium 9.3 mg/dL      Total Protein 7.6 g/dL      Albumin 4.50 g/dL      ALT (SGPT) 34 U/L      AST (SGOT) 22 U/L      Alkaline Phosphatase 69 U/L      Total Bilirubin 0.3 mg/dL      eGFR Non African Amer 99 mL/min/1.73      Globulin 3.1 gm/dL      A/G Ratio 1.5 g/dL      BUN/Creatinine Ratio 20.7     Anion Gap 14.0 mmol/L     Narrative:       GFR Normal >60  Chronic Kidney Disease <60  Kidney Failure <15    Troponin [[id number]]  (Normal) Collected:  09/21/19 1235    Specimen:  Blood Updated:  09/21/19 1320     Troponin T <0.010 ng/mL     Narrative:       Troponin T Reference Range:  <= 0.03 ng/mL-   Negative for AMI  >0.03 ng/mL-     Abnormal for myocardial necrosis.  Clinicians would have to utilize clinical acumen, EKG, Troponin and serial changes to determine if it is an Acute Myocardial Infarction or myocardial injury due to an underlying chronic condition.     Protime-INR [[id number]]  (Abnormal) Collected:  09/21/19 1235    Specimen:  Blood Updated:  09/21/19 1310     Protime 11.9 Seconds      INR 0.85    aPTT [[id number]]  (Normal) Collected:  09/21/19 1235    Specimen:  Blood Updated:  09/21/19 1310     PTT 31.6 seconds     CBC & Differential [[id number]] Collected:  09/21/19 1235    Specimen:  Blood Updated:  09/21/19 1301    Narrative:       The following orders were created for panel order CBC & Differential.  Procedure                               Abnormality         Status                     ---------                               -----------          ------                     CBC Auto Differential[[id number]]        Abnormal            Final result                 Please view results for these tests on the individual orders.    CBC Auto Differential [[id number]]  (Abnormal) Collected:  09/21/19 1235    Specimen:  Blood Updated:  09/21/19 1301     WBC 8.24 10*3/mm3      RBC 4.95 10*6/mm3      Hemoglobin 15.3 g/dL      Hematocrit 44.6 %      MCV 90.1 fL      MCH 30.9 pg      MCHC 34.3 g/dL      RDW 12.5 %      RDW-SD 41.5 fl      MPV 9.3 fL      Platelets 252 10*3/mm3      Neutrophil % 58.7 %      Lymphocyte % 26.3 %      Monocyte % 10.8 %      Eosinophil % 2.9 %      Basophil % 0.6 %      Immature Grans % 0.7 %      Neutrophils, Absolute 4.83 10*3/mm3      Lymphocytes, Absolute 2.17 10*3/mm3      Monocytes, Absolute 0.89 10*3/mm3      Eosinophils, Absolute 0.24 10*3/mm3      Basophils, Absolute 0.05 10*3/mm3      Immature Grans, Absolute 0.06 10*3/mm3      nRBC 0.0 /100 WBC         Magnesium of 4  .  Imaging Results (last 24 hours)     Procedure Component Value Units Date/Time    CT Head Without Contrast Stroke Protocol [[id number]] Collected:  09/21/19 1339     Updated:  09/21/19 1343    Narrative:       CT HEAD WO CONTRAST STROKE PROTOCOL- 9/21/2019 1:21 PM CDT     HISTORY: Stroke, left-sided numbness.     COMPARISON: 08/08/2019      DOSE LENGTH PRODUCT: 989 mGy cm. Automated exposure control was also  utilized to decrease patient radiation dose.     TECHNIQUE: Helical tomographic images of the brain were obtained without  the use of intravenous contrast.      FINDINGS:   Evolution of the previous RIGHT PCA territory infarct is noted. There is  no evidence of acute hemorrhage. There is no hydrocephalus or abnormal  extra-axial fluid collection. There is volume loss in the medial aspect  of the RIGHT temporal lobe, consistent with evolution of the previously  seen infarct.     The bones are intact. The orbits and their contents are unremarkable.  The  visualized sinuses are clear.       Impression:       1. No acute intracranial hemorrhage.  2. Evolution of the previously seen RIGHT PCA territory infarct.  This report was finalized on 09/21/2019 13:40 by Dr. Anant Tilley MD.    XR Chest 1 View [[id number]] Collected:  09/21/19 1306     Updated:  09/21/19 1309    Narrative:       XR CHEST 1 VW- 9/21/2019 12:58 PM CDT     HISTORY: Acute Stroke Protocol (onset < 12 hrs)       COMPARISON: None.     FINDINGS:   The lungs are clear. The cardiomediastinal silhouette and pulmonary  vascularity are within normal limits.      The osseous structures and surrounding soft tissues demonstrate no acute  abnormality.       Impression:       1. No radiographic evidence of acute cardiopulmonary process.        This report was finalized on 09/21/2019 13:06 by Dr. Anant Tilley MD.              Impression    1. There is greater weakness on left than his baseline per family and there is also a subtle left facial weakness not present based  on records from [facility]. We may be dealing with another stroke verses Jonn's but latter is lower on differential  2. Myoclonic jerking looking more like spasm and he responds to Ativan.  Unclear if he responds to Baclofen      The type of myoclonic spasm he is having is often seen with pontine strokes so until we obtain a MRI cannot exclude diagnosis of another acute stroke    Plan  · STAT EEG  · STAT Baclofen  · Ativan after EEG  · MRI of brain when movement subside--too much artifact.  · [facility].report  · Obtain records from [facility]    I discussed the patients findings and my recommendations with patient, family and Dr Downing     ADDENDUM:  [facility]a report shows that he was given Xanax 0.25 mg 30 tabs on 8/21/19    Rima Lord MD  09/21/19  4:25 PM

## 2019-09-21 NOTE — ED PROVIDER NOTES
Subjective   Patient with sudden onset since yesterday of drawing of the left arm left leg and able to walk and also and also having spasm of both the left arm left leg has a history of CVA which affected left arm and left leg        Stroke   Presenting symptoms: incoordination    Presenting symptoms: no confusion, no headaches and no language symptoms    Onset quality:  Gradual  Timing:  Constant  Progression:  Unchanged  Similar to previous episodes: no    Associated symptoms: no chest pain, no difficulty swallowing, no dizziness, no facial pain, no fever, no hearing loss, no bladder incontinence, no nausea, no seizures, no tinnitus, no vertigo and no vomiting        Review of Systems   Constitutional: Negative.  Negative for chills, fatigue and fever.   HENT: Negative.  Negative for congestion, hearing loss, tinnitus and trouble swallowing.    Respiratory: Negative.  Negative for cough, chest tightness and stridor.    Cardiovascular: Negative.  Negative for chest pain.   Gastrointestinal: Negative.  Negative for abdominal distention, abdominal pain, nausea and vomiting.   Endocrine: Negative.    Genitourinary: Negative.  Negative for bladder incontinence, difficulty urinating and flank pain.   Musculoskeletal: Negative.    Skin: Negative.  Negative for color change.   Neurological: Positive for disturbances in coordination. Negative for dizziness, vertigo, seizures and headaches.   Psychiatric/Behavioral: Negative for confusion.   All other systems reviewed and are negative.      Past Medical History:   Diagnosis Date   • Benign essential HTN 8/8/2019   • HLD (hyperlipidemia) 8/8/2019       No Known Allergies    Past Surgical History:   Procedure Laterality Date   • FEMORAL LYMPH NODE BIOPSY/EXCISON     • MOUTH SURGERY         Family History   Problem Relation Age of Onset   • Hypertension Mother    • Cancer Mother    • Stroke Mother    • No Known Problems Father        Social History     Socioeconomic History    • Marital status: Single     Spouse name: Not on file   • Number of children: Not on file   • Years of education: Not on file   • Highest education level: Not on file   Tobacco Use   • Smoking status: Former Smoker     Packs/day: 1.00     Years: 42.00     Pack years: 42.00     Types: Cigarettes   • Smokeless tobacco: Never Used   • Tobacco comment: pt quit a month ago   Substance and Sexual Activity   • Alcohol use: No     Alcohol/week: 42.0 oz     Types: 70 Shots of liquor per week     Frequency: Never   • Drug use: Yes     Frequency: 7.0 times per week     Types: Marijuana     Comment: pt states he quit   • Sexual activity: Not Currently           Objective   Physical Exam   Constitutional: He is oriented to person, place, and time. He appears well-developed and well-nourished. No distress.   HENT:   Head: Normocephalic.   Nose: Nose normal.   Mouth/Throat: Oropharynx is clear and moist.   Eyes: EOM are normal. Pupils are equal, round, and reactive to light. Right eye exhibits no discharge. Left eye exhibits no discharge. No scleral icterus.   Neck: Normal range of motion. Neck supple. No JVD present. No neck rigidity. No tracheal deviation present. No Brudzinski's sign and no Kernig's sign noted. No thyromegaly present.   Cardiovascular: Normal rate, regular rhythm, normal heart sounds and intact distal pulses. Exam reveals no friction rub.   No murmur heard.  Pulmonary/Chest: Effort normal and breath sounds normal. No stridor. No respiratory distress. He has no wheezes. He has no rales. He exhibits no tenderness.   Abdominal: Soft. Bowel sounds are normal. He exhibits no distension and no mass.   Musculoskeletal: Normal range of motion. He exhibits no edema or tenderness.   Neurological: He is alert and oriented to person, place, and time. He has normal reflexes. He is not disoriented. He displays normal reflexes. No cranial nerve deficit or sensory deficit. He exhibits normal muscle tone. He displays no  seizure activity. Coordination normal. GCS eye subscore is 4. GCS verbal subscore is 5. GCS motor subscore is 6.   No focal deficits myoclonus of left arm left leg is awake   Skin: Skin is warm and dry. No rash noted. He is not diaphoretic. No erythema.   Psychiatric: He has a normal mood and affect.   Nursing note and vitals reviewed.      Procedures           ED Course  ED Course as of Sep 21 1746   Sat Sep 21, 2019   1743 Patient has been over here for hours getting a neurological work-up.  Eventually the neurologist decided the patient EEG is equivocal  since the patient received Ativan in the ER then the patient received baclofen and Ativan by the neurologist and decision is to admit the patient to the hospital service for further evaluation of this condition  [TS]      ED Course User Index  [TS] Damien Downing MD                  [facility]    Final diagnoses:   Myoclonus   Ataxia   Essential hypertension              Damien Downing MD  09/21/19 1748

## 2019-09-22 ENCOUNTER — APPOINTMENT (OUTPATIENT)
Dept: MRI IMAGING | Facility: HOSPITAL | Age: 49
End: 2019-09-22

## 2019-09-22 VITALS
TEMPERATURE: 97.9 F | SYSTOLIC BLOOD PRESSURE: 128 MMHG | OXYGEN SATURATION: 95 % | DIASTOLIC BLOOD PRESSURE: 82 MMHG | WEIGHT: 218 LBS | HEIGHT: 67 IN | HEART RATE: 64 BPM | BODY MASS INDEX: 34.21 KG/M2 | RESPIRATION RATE: 18 BRPM

## 2019-09-22 LAB
ALBUMIN SERPL-MCNC: 4.1 G/DL (ref 3.5–5.2)
ALBUMIN/GLOB SERPL: 1.4 G/DL
ALP SERPL-CCNC: 59 U/L (ref 39–117)
ALT SERPL W P-5'-P-CCNC: 30 U/L (ref 1–41)
ANION GAP SERPL CALCULATED.3IONS-SCNC: 11 MMOL/L (ref 5–15)
AST SERPL-CCNC: 19 U/L (ref 1–40)
BASOPHILS # BLD AUTO: 0.04 10*3/MM3 (ref 0–0.2)
BASOPHILS NFR BLD AUTO: 0.6 % (ref 0–1.5)
BILIRUB SERPL-MCNC: 0.5 MG/DL (ref 0.2–1.2)
BUN BLD-MCNC: 15 MG/DL (ref 6–20)
BUN/CREAT SERPL: 19.2 (ref 7–25)
CALCIUM SPEC-SCNC: 9.5 MG/DL (ref 8.6–10.5)
CHLORIDE SERPL-SCNC: 104 MMOL/L (ref 98–107)
CHOLEST SERPL-MCNC: 170 MG/DL (ref 0–200)
CO2 SERPL-SCNC: 27 MMOL/L (ref 22–29)
CREAT BLD-MCNC: 0.78 MG/DL (ref 0.76–1.27)
DEPRECATED RDW RBC AUTO: 41.1 FL (ref 37–54)
EOSINOPHIL # BLD AUTO: 0.21 10*3/MM3 (ref 0–0.4)
EOSINOPHIL NFR BLD AUTO: 3 % (ref 0.3–6.2)
ERYTHROCYTE [DISTWIDTH] IN BLOOD BY AUTOMATED COUNT: 12.5 % (ref 12.3–15.4)
GFR SERPL CREATININE-BSD FRML MDRD: 105 ML/MIN/1.73
GLOBULIN UR ELPH-MCNC: 2.9 GM/DL
GLUCOSE BLD-MCNC: 102 MG/DL (ref 65–99)
HBA1C MFR BLD: 6.2 % (ref 4.8–5.6)
HCT VFR BLD AUTO: 41.9 % (ref 37.5–51)
HDLC SERPL-MCNC: 43 MG/DL (ref 40–60)
HGB BLD-MCNC: 14.3 G/DL (ref 13–17.7)
IMM GRANULOCYTES # BLD AUTO: 0.07 10*3/MM3 (ref 0–0.05)
IMM GRANULOCYTES NFR BLD AUTO: 1 % (ref 0–0.5)
LDLC SERPL CALC-MCNC: 95 MG/DL (ref 0–100)
LDLC/HDLC SERPL: 2.21 {RATIO}
LYMPHOCYTES # BLD AUTO: 2.43 10*3/MM3 (ref 0.7–3.1)
LYMPHOCYTES NFR BLD AUTO: 34.6 % (ref 19.6–45.3)
MCH RBC QN AUTO: 30.6 PG (ref 26.6–33)
MCHC RBC AUTO-ENTMCNC: 34.1 G/DL (ref 31.5–35.7)
MCV RBC AUTO: 89.7 FL (ref 79–97)
MONOCYTES # BLD AUTO: 0.84 10*3/MM3 (ref 0.1–0.9)
MONOCYTES NFR BLD AUTO: 11.9 % (ref 5–12)
NEUTROPHILS # BLD AUTO: 3.44 10*3/MM3 (ref 1.7–7)
NEUTROPHILS NFR BLD AUTO: 48.9 % (ref 42.7–76)
NRBC BLD AUTO-RTO: 0 /100 WBC (ref 0–0.2)
PLATELET # BLD AUTO: 237 10*3/MM3 (ref 140–450)
PMV BLD AUTO: 9.5 FL (ref 6–12)
POTASSIUM BLD-SCNC: 4 MMOL/L (ref 3.5–5.2)
PROT SERPL-MCNC: 7 G/DL (ref 6–8.5)
RBC # BLD AUTO: 4.67 10*6/MM3 (ref 4.14–5.8)
SODIUM BLD-SCNC: 142 MMOL/L (ref 136–145)
TRIGL SERPL-MCNC: 159 MG/DL (ref 0–150)
TSH SERPL DL<=0.05 MIU/L-ACNC: 3.6 UIU/ML (ref 0.27–4.2)
VLDLC SERPL-MCNC: 31.8 MG/DL
WBC NRBC COR # BLD: 7.03 10*3/MM3 (ref 3.4–10.8)

## 2019-09-22 PROCEDURE — 84443 ASSAY THYROID STIM HORMONE: CPT | Performed by: FAMILY MEDICINE

## 2019-09-22 PROCEDURE — 97166 OT EVAL MOD COMPLEX 45 MIN: CPT

## 2019-09-22 PROCEDURE — 99226 PR SBSQ OBSERVATION CARE/DAY 35 MINUTES: CPT | Performed by: PSYCHIATRY & NEUROLOGY

## 2019-09-22 PROCEDURE — 97162 PT EVAL MOD COMPLEX 30 MIN: CPT

## 2019-09-22 PROCEDURE — G0378 HOSPITAL OBSERVATION PER HR: HCPCS

## 2019-09-22 PROCEDURE — 94799 UNLISTED PULMONARY SVC/PX: CPT

## 2019-09-22 PROCEDURE — 80061 LIPID PANEL: CPT | Performed by: FAMILY MEDICINE

## 2019-09-22 PROCEDURE — 70551 MRI BRAIN STEM W/O DYE: CPT

## 2019-09-22 PROCEDURE — 85025 COMPLETE CBC W/AUTO DIFF WBC: CPT | Performed by: FAMILY MEDICINE

## 2019-09-22 PROCEDURE — 80053 COMPREHEN METABOLIC PANEL: CPT | Performed by: FAMILY MEDICINE

## 2019-09-22 PROCEDURE — 83036 HEMOGLOBIN GLYCOSYLATED A1C: CPT | Performed by: FAMILY MEDICINE

## 2019-09-22 RX ORDER — ATORVASTATIN CALCIUM 40 MG/1
40 TABLET, FILM COATED ORAL NIGHTLY
Status: DISCONTINUED | OUTPATIENT
Start: 2019-09-22 | End: 2019-09-22 | Stop reason: HOSPADM

## 2019-09-22 RX ORDER — BACLOFEN 10 MG/1
10 TABLET ORAL 3 TIMES DAILY
Qty: 60 TABLET | Refills: 0 | Status: SHIPPED | OUTPATIENT
Start: 2019-09-22 | End: 2019-09-25 | Stop reason: SDUPTHER

## 2019-09-22 RX ORDER — BACLOFEN 10 MG/1
10 TABLET ORAL EVERY 8 HOURS
Qty: 60 TABLET | Refills: 0 | Status: SHIPPED | OUTPATIENT
Start: 2019-09-22 | End: 2019-09-22

## 2019-09-22 RX ORDER — BACLOFEN 10 MG/1
10 TABLET ORAL EVERY 8 HOURS
Status: DISCONTINUED | OUTPATIENT
Start: 2019-09-22 | End: 2019-09-22 | Stop reason: HOSPADM

## 2019-09-22 RX ADMIN — SODIUM CHLORIDE, PRESERVATIVE FREE 10 ML: 5 INJECTION INTRAVENOUS at 08:29

## 2019-09-22 RX ADMIN — BACLOFEN 10 MG: 10 TABLET ORAL at 14:15

## 2019-09-22 RX ADMIN — ASPIRIN 81 MG 81 MG: 81 TABLET ORAL at 08:29

## 2019-09-22 RX ADMIN — AMLODIPINE BESYLATE 10 MG: 10 TABLET ORAL at 08:29

## 2019-09-22 RX ADMIN — BACLOFEN 10 MG: 10 TABLET ORAL at 05:00

## 2019-09-22 RX ADMIN — TRAZODONE HYDROCHLORIDE 100 MG: 100 TABLET ORAL at 08:29

## 2019-09-22 RX ADMIN — TERAZOSIN HYDROCHLORIDE 5 MG: 5 CAPSULE ORAL at 08:29

## 2019-09-22 RX ADMIN — PANTOPRAZOLE SODIUM 40 MG: 40 TABLET, DELAYED RELEASE ORAL at 08:29

## 2019-09-22 RX ADMIN — DULOXETINE HYDROCHLORIDE 30 MG: 30 CAPSULE, DELAYED RELEASE ORAL at 08:29

## 2019-09-22 NOTE — THERAPY EVALUATION
Acute Care - Occupational Therapy Initial Evaluation  [facility]     Patient Name: Olman Vaca  : 1970  MRN: [id number]  Today's Date: 2019  Onset of Illness/Injury or Date of Surgery: 19  Date of Referral to OT: 19  Referring Physician: Dr. Hernandes    Admit Date: 2019       ICD-10-CM ICD-9-CM   1. 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 Ataxia R27.0 781.3   3. Essential hypertension I10 401.9   4. Impaired mobility Z74.09 799.89   5. Decreased activities of daily living (ADL) R68.89 780.99     Patient Active Problem List   Diagnosis   • Left hemiparesis (CMS/HCC)   • CVA (cerebral vascular accident) (CMS/HCC)   • Benign essential HTN   • Medical non-compliance   • HLD (hyperlipidemia)   • Alcohol use disorder, moderate, dependence (CMS/HCC)   • Vocal cord polyp   • Berry aneurysm   • Homonymous hemianopia, left   • Thalamic pain syndrome   • Former smoker   • BMI 32.0-32.9,adult   • Myoclonus   • Hx of arterial ischemic stroke   • Former consumption of alcohol     Past Medical History:   Diagnosis Date   • Benign essential HTN 2019   • HLD (hyperlipidemia) 2019     Past Surgical History:   Procedure Laterality Date   • FEMORAL LYMPH NODE BIOPSY/EXCISON     • MOUTH SURGERY            OT ASSESSMENT FLOWSHEET (last 12 hours)      Occupational Therapy Evaluation     Row Name 19 1015                   OT Evaluation Time/Intention    Subjective Information  complains of;pain;weakness;fatigue;dizziness;nausea/vomiting;numbness numbness in LUE and L foot and toes  -        Document Type  evaluation  -AC        Mode of Treatment  occupational therapy  -        Comment  eval at 1135  -           General Information    Patient Profile Reviewed?  yes  -AC        Onset of Illness/Injury or Date of Surgery  19  -        Referring Physician  Dr. Hernandes  -        Patient Observations  alert;cooperative;agree to therapy  -        General Observations of Patient  sitting EOB, no  apparent distress  -AC        Prior Level of Function  independent:;all household mobility;community mobility;gait;transfer;ADL's;home management;cooking;cleaning uses quad cane for community mobility  -        Equipment Currently Used at Home  cane, quad;shower chair  -        Pertinent History of Current Functional Problem  h/o CVA, now presents with new onset L sided jerking of extremities and ataxia; Dx: myoclonus  -AC        Existing Precautions/Restrictions  fall  -AC        Limitations/Impairments  visual;sensory  -AC        Risks Reviewed  patient:;LOB;nausea/vomiting;dizziness;increased discomfort;change in vital signs;lines disloged  -AC        Benefits Reviewed  patient:;improve function;increase independence;increase strength;increase balance;decrease pain;decrease risk of DVT;improve skin integrity;increase knowledge  -        Barriers to Rehab  visual deficit;previous functional deficit  -           Relationship/Environment    Lives With  sibling(s)  -           Resource/Environmental Concerns    Current Living Arrangements  home/apartment/condo  -           Home Main Entrance    Number of Stairs, Main Entrance  four  -AC        Stair Railings, Main Entrance  none  -           Cognitive Assessment/Interventions    Additional Documentation  Cognitive Assessment/Intervention (Group)  -           Cognitive Assessment/Intervention- PT/OT    Orientation Status (Cognition)  oriented x 4  -AC        Follows Commands (Cognition)  WNL;follows one step commands  -        Personal Safety Interventions  fall prevention program maintained;gait belt;muscle strengthening facilitated;nonskid shoes/slippers when out of bed;supervised activity  -AC           Safety Issues, Functional Mobility    Impairments Affecting Function (Mobility)  balance;coordination;grasp;motor control;muscle tone abnormal;pain;range of motion (ROM);sensation/sensory awareness;strength;visual/perceptual  -AC           Bed  Mobility Assessment/Treatment    Comment (Bed Mobility)  up at EOB  -           Functional Mobility    Functional Mobility- Ind. Level  minimum assist (75% patient effort)  -        Functional Mobility- Device  quad cane  -        Functional Mobility- Comment  a few steps forward and backward to bed with severe drawing of L sided extremities with each step he took  -           Transfer Assessment/Treatment    Transfer Assessment/Treatment  sit-stand transfer;stand-sit transfer  -           Sit-Stand Transfer    Sit-Stand Odd (Transfers)  contact guard  -           Stand-Sit Transfer    Stand-Sit Odd (Transfers)  contact guard  -           ADL Assessment/Intervention    BADL Assessment/Intervention  lower body dressing  -           Lower Body Dressing Assessment/Training    Lower Body Dressing Odd Level  don;doff;socks;minimum assist (75% patient effort)  -        Lower Body Dressing Position  edge of bed sitting  -        Comment (Lower Body Dressing)  lifting of LLE for task is painful and stiff  -           BADL Safety/Performance    Impairments, BADL Safety/Performance  balance;endurance/activity tolerance;motor control;muscle tone abnormality;pain;range of motion;sensory awareness;strength;visual/perceptual  -           General ROM    GENERAL ROM COMMENTS  WFL AROM RUE, LUE limited by approx 25% due to pain and stiffness  -           MMT (Manual Muscle Testing)    General MMT Comments  unable to fully assess LUE strength due to pain and hypersensitivity, RUE 5/5, LUE grossly  -           Motor Assessment/Interventions    Additional Documentation  Balance (Group);Fine Motor Testing & Training (Group);Gross Motor Coordination (Group)  -           Gross Motor Coordination    Gross Motor Skill, Impairments Detail  ED impaired in LUE, finger to nose impaired  LUE  -           Balance    Balance  static sitting balance;static standing balance;dynamic sitting  balance;dynamic standing balance  -AC           Static Sitting Balance    Level of [place] (Unsupported Sitting, Static Balance)  independent  -AC        Sitting Position (Unsupported Sitting, Static Balance)  sitting on edge of bed  -AC           Dynamic Sitting Balance    Level of [place], Reaches Outside Midline (Sitting, Dynamic Balance)  supervision  -AC           Static Standing Balance    Level of [place] (Supported Standing, Static Balance)  contact guard assist  -AC           Dynamic Standing Balance    Level of [place], Reaches Outside Midline (Standing, Dynamic Balance)  minimal assist, 75% patient effort  -AC           Fine Motor Testing & Training    Comment, Fine Motor Coordination  opposition intact B  -AC           Sensory Assessment/Intervention    Sensory General Assessment  other (see comments) hypersensitive throughout LUE, light/deep/prop intact in LUE  -AC           Positioning and Restraints    Pre-Treatment Position  in bed  -AC        Post Treatment Position  bed  -AC        In Bed  sitting EOB;call light within reach;encouraged to call for assist;side rails up x2  -AC           Pain Assessment    Additional Documentation  Pain Scale: Numbers Pre/Post-Treatment (Group)  -AC           Pain Scale: Numbers Pre/Post-Treatment    Pain Scale: Numbers, Pretreatment  8/10  -AC        Pain Location - Side  Left  -AC        Pain Location  hand  -AC        Pain Intervention(s)  Repositioned;Ambulation/increased activity  -AC           Plan of Care Review    Plan of Care Reviewed With  patient  -AC           Clinical Impression (OT)    Date of Referral to OT  09/21/19  -AC        OT Diagnosis  decreased adl  -AC        Prognosis (OT Eval)  good  -AC        Criteria for Skilled Therapeutic Interventions Met (OT Eval)  yes;treatment indicated  -AC        Rehab Potential (OT Eval)  good, to achieve stated therapy goals  -AC        Therapy Frequency (OT Eval)  5 times/wk  -AC         Predicted Duration of Therapy Intervention (Therapy Eval)  10 days  -AC        Care Plan Review (OT)  evaluation/treatment results reviewed;care plan/treatment goals reviewed;risks/benefits reviewed;current/potential barriers reviewed;patient/other agree to care plan  -AC        Anticipated Discharge Disposition (OT)  inpatient rehabilitation facility  -AC           Planned OT Interventions    Planned Therapy Interventions (OT Eval)  BADL retraining;functional balance retraining;occupation/activity based interventions;passive ROM/stretching;patient/caregiver education/training;ROM/therapeutic exercise;strengthening exercise;transfer/mobility retraining;neuromuscular control/coordination retraining  -AC           OT Goals    Transfer Goal Selection (OT)  transfer, OT goal 1  -AC        Balance Goal Selection (OT)  balance, OT goal 1  -AC        Coordination Goal Selection (OT)  coordination, OT goal 1  -AC        Additional Documentation  Balance Goal Selection (OT) (Row);Coordination Goal Selection (OT) (Row)  -AC           Transfer Goal 1 (OT)    Activity/Assistive Device (Transfer Goal 1, OT)  toilet;tub  -AC        [place] Level/Cues Needed (Transfer Goal 1, OT)  contact guard assist  -AC        Time Frame (Transfer Goal 1, OT)  long term goal (LTG);10 days  -AC        Progress/Outcome (Transfer Goal 1, OT)  goal ongoing  -AC           Balance Goal 1 (OT)    Activity/Assistive Device (Balance Goal 1, OT)  standing, dynamic  -AC        [place] Level/Cues Needed (Balance Goal 1, OT)  standby assist  -AC        Time Frame (Balance Goal 1, OT)  long term goal (LTG);10 days  -AC        Progress/Outcomes (Balance Goal 1, OT)  goal ongoing  -AC           Coordination Goal 1 (OT)    Activity/Assistive Device (Coordination Goal 1, OT)  FM task;GM task  -AC        [place] Level/Cues Needed (Coordination Goal 1, OT)  supervision required  -AC        Time Frame (Coordination Goal 1, OT)  long term goal  (LTG);10 days  -        Progress/Outcomes (Coordination Goal 1, OT)  goal ongoing  -           Living Environment    Home Accessibility  stairs to enter home;tub/shower is not walk in  -          User Key  (r) = Recorded By, (t) = Taken By, (c) = Cosigned By    Initials Name Effective Dates     Luis M Camacho, OTR/L, JOSE MANUEL 04/09/19 -          Occupational Therapy Education     Title: PT OT SLP Therapies (Done)     Topic: Occupational Therapy (Done)     Point: ADL training (Done)     Description: Instruct learner(s) on proper safety adaptation and remediation techniques during self care or transfers.   Instruct in proper use of assistive devices.    Learning Progress Summary           Patient Acceptance, E, VU by  at 9/22/2019  2:08 PM    Comment:  OT POC, benefits of activity                               User Key     Initials Effective Dates Name Provider Type Discipline     04/09/19 -  Luis M Camacho, OTR/L, JOSE MANUEL Occupational Therapist OT                  OT Recommendation and Plan  Outcome Summary/Treatment Plan (OT)  Anticipated Discharge Disposition (OT): inpatient rehabilitation facility  Planned Therapy Interventions (OT Eval): BADL retraining, functional balance retraining, occupation/activity based interventions, passive ROM/stretching, patient/caregiver education/training, ROM/therapeutic exercise, strengthening exercise, transfer/mobility retraining, neuromuscular control/coordination retraining  Therapy Frequency (OT Eval): 5 times/wk  Plan of Care Review  Plan of Care Reviewed With: patient  Plan of Care Reviewed With: patient  Outcome Summary: OT eval completed.  Pt has intermittently increased muscle tone, decreased AROM and weakness in L sided extremities.  He also has decreased coordination in LUE.  C/o stiffness in L sided extremities causing difficulty with basic ADL and balance.  LUE is hypersensitive and he has difficulty tolerating any type of tactile stimulus to his L arm.  Pt  needs Alia for LB dressing, CGA for transfers and was able to take a few steps forward and backward to  bed with quad cane.  Each step he took resulted in drawing of the LLE.  He reports that Baclofen has improved the pain, stiffness and drawing of L sided extremities.  He may require inpatient rehab at discharge pending progress.  OT to continue to treat to increase his independence with ADL.    Outcome Measures     Row Name 09/22/19 1130             How much help from another is currently needed...    Putting on and taking off regular lower body clothing?  3  -AC      Bathing (including washing, rinsing, and drying)  2  -AC      Toileting (which includes using toilet bed pan or urinal)  3  -AC      Putting on and taking off regular upper body clothing  3  -AC      Taking care of personal grooming (such as brushing teeth)  3  -AC      Eating meals  4  -AC      AM-PAC 6 Clicks Score (OT)  18  -AC         Functional Assessment    Outcome Measure Options  AM-PAC 6 Clicks Daily Activity (OT)  -        User Key  (r) = Recorded By, (t) = Taken By, (c) = Cosigned By    Initials Name Provider Type    Luis M Gastelum OTR/L, CNT Occupational Therapist          Time Calculation:   Time Calculation- OT     Row Name 09/22/19 1411             Time Calculation- OT    OT Start Time  1115  -      OT Stop Time  1215  -      OT Time Calculation (min)  60 min  -      OT Received On  09/22/19  -      OT Goal Re-Cert Due Date  10/02/19  -        User Key  (r) = Recorded By, (t) = Taken By, (c) = Cosigned By    Initials Name Provider Type    Luis M Gastelum OTR/L, CNT Occupational Therapist        Therapy Charges for Today     Code Description Service Date Service Provider Modifiers Qty    [id number]  OT EVAL MOD COMPLEXITY 3 9/22/2019 Luis M Camacho OTR/L, CNT GO 1               Luis M CAVANAUGH. ALFRED Camacho/L, CNT  9/22/2019

## 2019-09-22 NOTE — PLAN OF CARE
"Problem: Patient Care Overview  Goal: Plan of Care Review  Outcome: Ongoing (interventions implemented as appropriate)   09/22/19 1215   Coping/Psychosocial   Plan of Care Reviewed With patient   OTHER   Outcome Summary PT IE complete. LLE cool to touch and swollen compared to RLE. Uncontrolled muscle contractions of LLE/LUE interfering w/ functional mobility. Pt also hypersensitive to light touch in LUE. Ambulated a few steps w/ QC then returned to EOB. Impaired heel to shin LLE. C/o LLE feeling \"heavy\". May benefit from acute rehab at [place] pending progress. Thank you for referral.         "

## 2019-09-22 NOTE — PLAN OF CARE
"Problem: Fall Risk (Adult)  Goal: Identify Related Risk Factors and Signs and Symptoms  Outcome: Outcome(s) achieved Date Met: 09/22/19      Problem: Patient Care Overview  Goal: Plan of Care Review  Outcome: Ongoing (interventions implemented as appropriate)   09/22/19 9332   Coping/Psychosocial   Plan of Care Reviewed With patient   Plan of Care Review   Progress no change   OTHER   Outcome Summary Pt A&O. Has some L sided weakness as residual form his recent stroke. LUE and LLE with jerk or \"draw up\" when patient tries to raise up or ambulate. Has been under contorl most of the night, recently called out for his \"ativan\" for spasms, reminded him that he doesn't have that ordered but can have Baclofen.        Problem: Stroke (Ischemic) (Adult)  Goal: Signs and Symptoms of Listed Potential Problems Will be Absent, Minimized or Managed (Stroke)  Outcome: Ongoing (interventions implemented as appropriate)        "

## 2019-09-22 NOTE — PLAN OF CARE
Problem: Patient Care Overview  Goal: Plan of Care Review  Outcome: Ongoing (interventions implemented as appropriate)   09/22/19 5687   Coping/Psychosocial   Plan of Care Reviewed With patient   Plan of Care Review   Progress no change   OTHER   Outcome Summary OT eval completed. Pt has intermittently increased muscle tone, decreased AROM and weakness in L sided extremities. He also has decreased coordination in LUE. C/o stiffness in L sided extremities causing difficulty with basic ADL and balance. LUE is hypersensitive and he has difficulty tolerating any type of tactile stimulus to his L arm. Pt needs Alia for LB dressing, CGA for transfers and was able to take a few steps forward and backward to bed with quad cane. Each step he took resulted in drawing of the LLE. He reports that Baclofen has improved the pain, stiffness and drawing of L sided extremities. He may require inpatient rehab at discharge pending progress. OT to continue to treat to increase his independence with ADL.

## 2019-09-22 NOTE — DISCHARGE SUMMARY
[facility] Medicine Services  DISCHARGE SUMMARY       Date of Admission: 9/21/2019  Date of Discharge:  9/22/2019  Primary Care Physician: Cassandra Cortes APRN    Presenting Problem/History of Present Illness:  Myoclonus [G25.3]     Final Discharge Diagnoses:  Active Hospital Problems    Diagnosis   • Myoclonus   • Hx of arterial ischemic stroke   • Former consumption of alcohol   • Former smoker   • HLD (hyperlipidemia)       Consults: Neurology    Pertinent Test Results:   EEG  Interpretation:  This is a normal awake and drowsy EEG.    IMPRESSION: Chest x-ray  1. No radiographic evidence of acute cardiopulmonary process.    IMPRESSION: CT scan of the head  1. No acute intracranial hemorrhage.  2. Evolution of the previously seen RIGHT PCA territory infarct.    Impression: MRI of the head  1. Again, ischemia is noted in the right PCA territory, involving the  posterior temporal and occipital lobes as well as the right basal  ganglia. No mass effect or hemorrhagic transformation. The flow void for  the right posterior cerebral artery is not well seen by MRI and may be  occluded or stenosed.    Chief Complaint on Day of Discharge: none    History of Present Illness on Day of Discharge:   Patient is a 49-year-old  male presented ER with complaining of possible seizure.  Symptoms started yesterday causing jerking motion in his left upper and lower extremities.  Myoclonic-like motor motion movement of the left arm and legs.  Patient went to [facility] yesterday and was admitted overnight  for the same reason.  Patient was released 9 AM this morning.  Patient received Ativan there.  Patient came here today with the same complaints.  Patient received Ativan x2 already with baclofen.  Patient stated neurology was unable to do read the EEG because patient has Ativan.  After that patient also got a dose of Ativan again and baclofen from the neurology.      Patient has  had a recent stroke about August 8 2019 on the right PCA/the thalamic stroke with left sided weakness and a right PCA stroke.  Patient also history of right MCA aneurysm.   Patient also had alcohol abuse.  Patient stated last time he had a drink was 2 months ago.  Patient also admitting to using marijuana 2 months ago.  Patient also stopped smoking 2 months ago per patient.     Patient went after to [facility] rehab for week.  Patient has been doing physical therapy outpatient after.  Patient being walking around the cane since then.    Hospital Course:  The patient is a 49 y.o. male who presented to [facility] with spasticity/hypertension/hyperlipidemia/history of CVA.      Spasticity.  Consult neurology.  This does not think this is seizure activity.  Patient no acute distress.  Patient talking during the whole episode.  Pt to continue baclofen.  CT scan of the head - no acute intracranial hemorrhage, evolution of the previously seen RIGHT PCA territory infarct.  Chest x-ray-no radiographic evidence of acute cardiopulmonary process.  EEG- normal awake and drowsy EEG.  MRI of the head still pending.     Hypertension/hyperlipidemia.  Pt to continue Norvasc.  Pt to continue Lipitor.     History of CVA.   Pt to continue aspirin.     Depression.  Pt to continue Cymbalta.     Reflux.  Pt to continue Protonix.  Pt to continue trazodone.     Prostate hypertrophy.  Pt to continue Hytrin.     History of alcohol and drug use.  Last use was 2 months ago per patient.  UDS showed positive for benzo and opiate.  Patient was admitted [facility] 1 day previously was given Norco there per patient.  Patient was also given Ativan in the ER.     Vital signs stable, afebrile.  Plan to discharge patient home today.  Follow with PCP 1 week.  Follow with neurologist coming Wednesday, 9/25/2019.    Condition on Discharge:  stable    Physical Exam on Discharge:  /82 (BP Location: Left arm, Patient Position: Sitting)   Pulse  "64   Temp 97.9 °F (36.6 °C) (Oral)   Resp 18   Ht 170.2 cm (67\")   Wt 98.9 kg (218 lb)   SpO2 95%   BMI 34.14 kg/m²   Physical Exam  Constitutional: He is oriented to person, place, and time. He appears well-developed.   HENT:   Head: Normocephalic.   Eyes: Conjunctivae are normal. Pupils are equal, round, and reactive to light.   Neck: Neck supple. No JVD present. No thyromegaly present.   Cardiovascular: Normal rate, regular rhythm, normal heart sounds and intact distal pulses. Exam reveals no gallop and no friction rub.   No murmur heard.  Pulmonary/Chest: Effort normal and breath sounds normal. No respiratory distress. He has no wheezes. He has no rales. He exhibits no tenderness.   Abdominal: Soft. Bowel sounds are normal. He exhibits no distension. There is no tenderness. There is no rebound and no guarding.   Chronic left abdomen pain due to muscle tear from the past.  Obesity.   Musculoskeletal: He exhibits edema. He exhibits no tenderness or deformity.   Left-sided weakness/abnormal coordination.  Pitting edema 1+ lower extremities.   Lymphadenopathy:     He has no cervical adenopathy.   Neurological: He is alert and oriented to person, place, and time. He displays normal reflexes. No cranial nerve deficit. He exhibits abnormal muscle tone. Coordination abnormal.   Skin: Skin is warm and dry. Capillary refill takes 2 to 3 seconds. No rash noted.   Psychiatric: He has a normal mood and affect. His behavior is normal. Judgment and thought content normal.   Nursing note and vitals reviewed.    Discharge Disposition:  Home or Self Care    Discharge Medications:     Discharge Medications      New Medications      Instructions Start Date   baclofen 10 MG tablet  Commonly known as:  LIORESAL   10 mg, Oral, Every 8 Hours         Continue These Medications      Instructions Start Date   amLODIPine 10 MG tablet  Commonly known as:  NORVASC   10 mg, Oral, Every 24 Hours Scheduled      aspirin 81 MG chewable " tablet   81 mg, Oral, Daily      atorvastatin 80 MG tablet  Commonly known as:  LIPITOR   80 mg, Oral, Nightly      DULoxetine 30 MG capsule  Commonly known as:  CYMBALTA   30 mg, Oral, Daily      pantoprazole 40 MG EC tablet  Commonly known as:  PROTONIX   40 mg, Oral      sennosides-docusate sodium 8.6-50 MG tablet  Commonly known as:  SENOKOT-S   2 tablets, Oral, 2 Times Daily      terazosin 5 MG capsule  Commonly known as:  HYTRIN   5 mg, Oral, Daily      traZODone 100 MG tablet  Commonly known as:  DESYREL   100 mg, Oral, Daily         Stop These Medications    ALPRAZolam 0.25 MG tablet  Commonly known as:  XANAX            Discharge Diet:   Diet Instructions     Advance Diet As Tolerated            Activity at Discharge:   Activity Instructions     Activity as Tolerated            Discharge Care Plan/Instructions:     Follow-up Appointments:   Future Appointments   Date Time Provider Department Center   9/25/2019 11:00 AM Noe Sumner PA [facility] None   Follow with PCP 1 week.  Follow with neurology on Wednesday 9/25/2019.    Kun Hernandes MD  09/22/19  4:11 PM    Time: Greater than 30 minutes.

## 2019-09-22 NOTE — PROGRESS NOTES
Neurology Progress Note      Date of admission: 9/21/2019 12:20 PM  Date of visit: 9/22/2019    Chief Complaint:  Muscle spasms    Subjective     Subjective:    Patient has received 3 doses of Baclofen 10 mg each and notes a substantial improvement in his spasms.  He is not sedated on this dose and is tolerating it    He reports problems with anxiety and with insomnia. He is asking for pain meds for his side which apparently occurred due to the spasms    Medications:  Current Facility-Administered Medications   Medication Dose Route Frequency Provider Last Rate Last Dose   • amLODIPine (NORVASC) tablet 10 mg  10 mg Oral Q24H Kun Hernandes MD   10 mg at 09/22/19 0829   • aspirin chewable tablet 81 mg  81 mg Oral Daily Kun Hernandes MD   81 mg at 09/22/19 0829   • atorvastatin (LIPITOR) tablet 80 mg  80 mg Oral Nightly Kun Hernandes MD   80 mg at 09/21/19 2058   • baclofen (LIORESAL) tablet 10 mg  10 mg Oral TID PRN Kun Hernandes MD   10 mg at 09/22/19 1415   • DULoxetine (CYMBALTA) DR capsule 30 mg  30 mg Oral Daily Kun Hernandes MD   30 mg at 09/22/19 0829   • pantoprazole (PROTONIX) EC tablet 40 mg  40 mg Oral Daily Kun Hernandes MD   40 mg at 09/22/19 0829   • sodium chloride 0.9 % flush 10 mL  10 mL Intravenous Q12H Kun Hernandes MD   10 mL at 09/22/19 0829   • sodium chloride 0.9 % flush 10 mL  10 mL Intravenous PRN Kun Hernandes MD       • terazosin (HYTRIN) capsule 5 mg  5 mg Oral Daily Kun Hernandes MD   5 mg at 09/22/19 0829   • traZODone (DESYREL) tablet 100 mg  100 mg Oral Daily Kun Hernandes MD   100 mg at 09/22/19 0829       Review of Systems:   -A 14 point review of systems is completed and is negative except for improved spasms  Objective     Objective      Vital Signs  Temp:  [97.7 °F (36.5 °C)-98.8 °F (37.1 °C)] 98 °F (36.7 °C)  Heart Rate:  [61-79] 66  Resp:  [16-18] 18  BP: (107-146)/(65-95) 136/86    Physical Exam:    HEENT:  Neck supple  CVS:  Regular rate and rhythm.  No  murmurs  Carotid Examination:  No bruits  Lungs:  Clear to auscultation  Abdomen:  Non-tender, Non-distended  Extremities:  No signs of peripheral edema    Neurologic Exam:    -Awake, Alert, Oriented X 3  -No word finding difficulties  -No aphasia  -No dysarthria  -Follows simple and complex commands    Cranial nerves II through XII intact.  CN II:  Visual fields full--he could count fingers in all 4 quadrants  Pupils equally reactive to light  CN III, IV, VI:  Extraocular Muscles full with no signs of nystagmus  CN V:  Facial sensory is symmetric   CN VII:  Facial motor symmetric  CN VIII:  Gross hearing intact bilaterally  CN IX/X:  Palate elevates symmetrically  CN XI:  Shoulder shrug symmetric  CN XII:  Tongue is midline on protrusion  Motor: (strength out of 5:  1= minimal movement, 2 = movement in plane of gravity, 3 = movement against gravity, 4 = movement against some resistance, 5 = full strength)    -Right Upper Ext: Proximal: 5 Distal: 5  -Left Upper Ext: Some weakness    -Right Lower Ext: Proximal: 5 Distal: 5  -Left Lower Ext: Proximal: 5 Distal: 5    DTR:  2+ throughout in all four extremities    Sensory:  -Intact to light touch, pinprick  Coordination/Gait:  -No ataxia     Results Review:    I reviewed the patient's new clinical results.    Lab Results (last 24 hours)     Procedure Component Value Units Date/Time    Hemoglobin A1c [[id number]]  (Abnormal) Collected:  09/22/19 0458    Specimen:  Blood Updated:  09/22/19 0552     Hemoglobin A1C 6.20 %     Narrative:       Hemoglobin A1C Ranges:    Increased Risk for Diabetes  5.7% to 6.4%  Diabetes                     >= 6.5%  Diabetic Goal                < 7.0%    Comprehensive Metabolic Panel [[id number]]  (Abnormal) Collected:  09/22/19 0458    Specimen:  Blood Updated:  09/22/19 0536     Glucose 102 mg/dL      BUN 15 mg/dL      Creatinine 0.78 mg/dL      Sodium 142 mmol/L      Potassium 4.0 mmol/L      Chloride 104 mmol/L      CO2 27.0 mmol/L       Calcium 9.5 mg/dL      Total Protein 7.0 g/dL      Albumin 4.10 g/dL      ALT (SGPT) 30 U/L      AST (SGOT) 19 U/L      Alkaline Phosphatase 59 U/L      Total Bilirubin 0.5 mg/dL      eGFR Non African Amer 105 mL/min/1.73      Globulin 2.9 gm/dL      A/G Ratio 1.4 g/dL      BUN/Creatinine Ratio 19.2     Anion Gap 11.0 mmol/L     Narrative:       GFR Normal >60  Chronic Kidney Disease <60  Kidney Failure <15    Lipid Panel [[id number]]  (Abnormal) Collected:  09/22/19 0458    Specimen:  Blood Updated:  09/22/19 0536     Total Cholesterol 170 mg/dL      Triglycerides 159 mg/dL      HDL Cholesterol 43 mg/dL      LDL Cholesterol  95 mg/dL      VLDL Cholesterol 31.8 mg/dL      LDL/HDL Ratio 2.21    Narrative:       Cholesterol Reference Ranges  ([place] Department of Health and Human Services ATP III Classifications)    Desirable          <200 mg/dL  Borderline High    200-239 mg/dL  High Risk          >240 mg/dL      Triglyceride Reference Ranges  ([place]. Department of Health and Human Services ATP III Classifications)    Normal           <150 mg/dL  Borderline High  150-199 mg/dL  High             200-499 mg/dL  Very High        >500 mg/dL    HDL Reference Ranges  ([place].S. Department of Health and Human Services ATP III Classifcations)    Low     <40 mg/dl (major risk factor for CHD)  High    >60 mg/dl ('negative' risk factor for CHD)        LDL Reference Ranges  ([place].S. Department of Health and Human Services ATP III Classifcations)    Optimal          <100 mg/dL  Near Optimal     100-129 mg/dL  Borderline High  130-159 mg/dL  High             160-189 mg/dL  Very High        >189 mg/dL    TSH [[id number]]  (Normal) Collected:  09/22/19 0458    Specimen:  Blood Updated:  09/22/19 0536     TSH 3.600 uIU/mL     CBC Auto Differential [[id number]]  (Abnormal) Collected:  09/22/19 0458    Specimen:  Blood Updated:  09/22/19 0514     WBC 7.03 10*3/mm3      RBC 4.67 10*6/mm3      Hemoglobin 14.3 g/dL      Hematocrit 41.9 %      MCV 89.7  fL      MCH 30.6 pg      MCHC 34.1 g/dL      RDW 12.5 %      RDW-SD 41.1 fl      MPV 9.5 fL      Platelets 237 10*3/mm3      Neutrophil % 48.9 %      Lymphocyte % 34.6 %      Monocyte % 11.9 %      Eosinophil % 3.0 %      Basophil % 0.6 %      Immature Grans % 1.0 %      Neutrophils, Absolute 3.44 10*3/mm3      Lymphocytes, Absolute 2.43 10*3/mm3      Monocytes, Absolute 0.84 10*3/mm3      Eosinophils, Absolute 0.21 10*3/mm3      Basophils, Absolute 0.04 10*3/mm3      Immature Grans, Absolute 0.07 10*3/mm3      nRBC 0.0 /100 WBC     Ethanol [[id number]] Collected:  09/21/19 1235    Specimen:  Blood Updated:  09/21/19 1822     Ethanol % <0.010 %     Narrative:       Not for legal purposes. Chain of Custody not followed.     Urinalysis With Culture If Indicated - Urine, Clean Catch [[id number]]  (Normal) Collected:  09/21/19 1741    Specimen:  Urine, Clean Catch Updated:  09/21/19 1814     Color, UA Yellow     Appearance, UA Clear     pH, UA 6.0     Specific Gravity, UA 1.011     Glucose, UA Negative     Ketones, UA Negative     Bilirubin, UA Negative     Blood, UA Negative     Protein, UA Negative     Leuk Esterase, UA Negative     Nitrite, UA Negative     Urobilinogen, UA 0.2 E.U./dL    Narrative:       Urine microscopic not indicated.    Urine Drug Screen - Urine, Clean Catch [[id number]]  (Abnormal) Collected:  09/21/19 1741    Specimen:  Urine, Clean Catch Updated:  09/21/19 1803     THC, Screen, Urine Negative     Phencyclidine (PCP), Urine Negative     Cocaine Screen, Urine Negative     Methamphetamine, Ur Negative     Opiate Screen Positive     Amphetamine Screen, Urine Negative     Benzodiazepine Screen, Urine Positive     Tricyclic Antidepressants Screen Negative     Methadone Screen, Urine Negative     Barbiturates Screen, Urine Negative     Oxycodone Screen, Urine Negative     Propoxyphene Screen Negative     Buprenorphine, Screen, Urine Negative    Narrative:       Cutoff For Drugs Screened:    Amphetamines                500 ng/ml  Barbiturates               200 ng/ml  Benzodiazepines            150 ng/ml  Cocaine                    150 ng/ml  Methadone                  200 ng/ml  Opiates                    100 ng/ml  Phencyclidine               25 ng/ml  THC                            50 ng/ml  Methamphetamine            500 ng/ml  Tricyclic Antidepressants  300 ng/ml  Oxycodone                  100 ng/ml  Propoxyphene               300 ng/ml  Buprenorphine               10 ng/ml    The normal value for all drugs tested is negative. This report includes unconfirmed screening results, with the cutoff values listed, to be used for medical treatment purposes only.  Unconfirmed results must not be used for non-medical purposes such as employment or legal testing.  Clinical consideration should be applied to any drug of abuse test, particularly when unconfirmed results are used.          Imaging Results (last 24 hours)     Procedure Component Value Units Date/Time    MRI Brain Without Contrast [[id number]] Collected:  09/22/19 1044     Updated:  09/22/19 1051    Narrative:       Indication: Myoclonus        Technique: Multisequence, multiplanar MRI of the brain without contrast.     Comparison: Brain MRI dated 08/08/2019     Findings:      Reidentified diffusion restriction in the posterior right temporal lobe  and right occipital lobe with additional diffusion restriction in the  right thalamus and along the posterior limb of the internal capsule.  This is less pronounced than the comparison MRI. No other areas of  diffusion signal abnormality. No intra-axial or extra-axial hemorrhage.  Notable FLAIR abnormality in the areas of diffusion abnormality. No mass  effect. No evidence of hemorrhagic transformation. Ventricles are  nondilated. The basal cisterns are symmetric. Posterior fossa structures  are unremarkable. Flow void for the right posterior cerebral artery not  well seen. Central arterial flow voids are otherwise  "unremarkable.  Orbital contents are unremarkable.       Impression:       Impression:     1. Again, ischemia is noted in the right PCA territory, involving the  posterior temporal and occipital lobes as well as the right basal  ganglia. No mass effect or hemorrhagic transformation. The flow void for  the right posterior cerebral artery is not well seen by MRI and may be  occluded or stenosed.  This report was finalized on 09/22/2019 10:48 by Dr Farshad Payne, .          MRI personally reviewed does not show any new stroke but does show the recent stroke known from his last admission  DWI and FLAIR      Assessment/Plan     Hospital Problem List      HLD (hyperlipidemia)    Former smoker    Myoclonus    Hx of arterial ischemic stroke    Former consumption of alcohol    Impression:  1. Spasticity /spasms on left side secondary to his stroke improved on baclofen  2. Recent left PCA stroke  3. Anxiety--likely what caused him to drink    4. Insomnia   Likely due to him no longer drinking. Will help to get this addressed as well as lack of sleep worsens anxiety and anxiety may contribute to insomnia.  5. Hemoglobin A1C is in diabetic range.   6. LDL at 95 with goal of <70     Plan:  · Change from PRN baclofen to q 8 hours and can slowly increase depending on need and side effects.  · He is seeing Emil ESCALANTE on Wednesday and so further need for adjustment can be done vis Neuro clinic  · He would benefit from more prolonged physical therapy to include stretching several times a day at home and to learn this at PT  · We discussed using you tube and finding therapy session there for CBT for treatment of anxiety or using Mindfulness--again there are \"sessions\" on you tube Or he can be seen in a psych clinic to learn these techniques. I told him he should avoid benzodiazepines for this  He is on low dose of Cymbalta which could be increased.   · Can reduce Lipitor from 80 to 40 mg for now--high dose was for acute " stroke  · Should stay on cardiac and carb consistent diet        Rima Lord MD  09/22/19  2:34 PM

## 2019-09-22 NOTE — PLAN OF CARE
Problem: Fall Risk (Adult)  Goal: Absence of Fall  Outcome: Outcome(s) achieved Date Met: 09/22/19      Problem: Patient Care Overview  Goal: Plan of Care Review  Outcome: Outcome(s) achieved Date Met: 09/22/19 09/22/19 1633   Coping/Psychosocial   Plan of Care Reviewed With patient;family   Plan of Care Review   Progress improving   OTHER   Outcome Summary MRI negative for acute stroke. Patient's muscle spasms are well controlled with 3 doses of baclofen. Patient to follow-up Wednesday with neurology.     Goal: Individualization and Mutuality  Outcome: Outcome(s) achieved Date Met: 09/22/19    Goal: Discharge Needs Assessment  Outcome: Outcome(s) achieved Date Met: 09/22/19    Goal: Interprofessional Rounds/Family Conf  Outcome: Outcome(s) achieved Date Met: 09/22/19      Problem: Stroke (Ischemic) (Adult)  Goal: Signs and Symptoms of Listed Potential Problems Will be Absent, Minimized or Managed (Stroke)  Outcome: Outcome(s) achieved Date Met: 09/22/19

## 2019-09-22 NOTE — THERAPY EVALUATION
Patient Name: Olman Vaca  : 1970    MRN: [id number]                              Today's Date: 2019       Admit Date: 2019    Visit Dx:     ICD-10-CM ICD-9-CM   1. 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 Ataxia R27.0 781.3   3. Essential hypertension I10 401.9   4. Impaired mobility Z74.09 799.89     Patient Active Problem List   Diagnosis   • Left hemiparesis (CMS/HCC)   • CVA (cerebral vascular accident) (CMS/HCC)   • Benign essential HTN   • Medical non-compliance   • HLD (hyperlipidemia)   • Alcohol use disorder, moderate, dependence (CMS/HCC)   • Vocal cord polyp   • Berry aneurysm   • Homonymous hemianopia, left   • Thalamic pain syndrome   • Former smoker   • BMI 32.0-32.9,adult   • Myoclonus   • Hx of arterial ischemic stroke   • Former consumption of alcohol     Past Medical History:   Diagnosis Date   • Benign essential HTN 2019   • HLD (hyperlipidemia) 2019     Past Surgical History:   Procedure Laterality Date   • FEMORAL LYMPH NODE BIOPSY/EXCISON     • MOUTH SURGERY       General Information     Row Name 19 1159          PT Evaluation Time/Intention    Document Type  evaluation admit w/ L side jerking and uncontrolled muscle contractions in L hip and LUE, Dx:  myoclonus  -     Mode of Treatment  physical therapy  -     Row Name 19 1150          General Information    Patient Profile Reviewed?  yes  -     Prior Level of Function  independent:;all household mobility;community mobility;ADL's;home management;cooking;cleaning;dependent:;driving QC  -     Existing Precautions/Restrictions  fall  -     Barriers to Rehab  medically complex;previous functional deficit;visual deficit  -     Row Name 19 1152          Relationship/Environment    Lives With  sibling(s)  -     Row Name 19 1159          Resource/Environmental Concerns    Current Living Arrangements  home/apartment/condo  -     Row Name 19 1159          Home Main Entrance    Number of  Stairs, Main Entrance  four  -     Stair Railings, Main Entrance  none  -     Row Name 09/22/19 1159          Cognitive Assessment/Intervention- PT/OT    Orientation Status (Cognition)  oriented x 4  -     Row Name 09/22/19 1159          Safety Issues, Functional Mobility    Safety Issues Affecting Function (Mobility)  ability to follow commands  -     Impairments Affecting Function (Mobility)  balance;coordination;motor control;muscle tone abnormal;strength  -       User Key  (r) = Recorded By, (t) = Taken By, (c) = Cosigned By    Initials Name Provider Type     Bry King, PT Physical Therapist        Mobility     Row Name 09/22/19 1159          Bed Mobility Assessment/Treatment    Bed Mobility Assessment/Treatment  bed mobility (all) activities  -     [place] Level (Bed Mobility)  supervision  -[facility] Name 09/22/19 1159          Sit-Stand Transfer    Sit-Stand [place] (Transfers)  contact guard  -[facility] Name 09/22/19 1159          Gait/Stairs Assessment/Training    [place] Level (Gait)  contact guard;minimum assist (75% patient effort)  -     Assistive Device (Gait)  cane, quad  -     Distance in Feet (Gait)  few steps forward then back to bed. pt w/ uncontrolled muscle contractions in L hip flexors when stepping. also to a lesser degree uncontrolled L trunk and LUE muscle contractions when stepping w/ LLE  -LH     Deviations/Abnormal Patterns (Gait)  left sided deviations  -       User Key  (r) = Recorded By, (t) = Taken By, (c) = Cosigned By    Initials Name Provider Type     Bry King, PT Physical Therapist        Obj/Interventions     Row Name 09/22/19 1206          General ROM    GENERAL ROM COMMENTS  WFL  -     Row Name 09/22/19 1206          MMT (Manual Muscle Testing)    General MMT Comments  RLE 5/5; LLE 4-/5  -[facility] Name 09/22/19 1206          Static Sitting Balance    Level of [place] (Unsupported Sitting, Static Balance)  independent  -      [facility] Name 09/22/19 1206          Dynamic Sitting Balance    Level of [place], Reaches Outside Midline (Sitting, Dynamic Balance)  supervision  -LH     Row Name 09/22/19 1206          Static Standing Balance    Level of [place] (Supported Standing, Static Balance)  contact guard assist  -LH     Row Name 09/22/19 1206          Dynamic Standing Balance    Level of [place], Reaches Outside Midline (Standing, Dynamic Balance)  minimal assist, 75% patient effort  -LH     Row Name 09/22/19 1206          Sensory Assessment/Intervention    Sensory General Assessment  -- hypersensitive to light touch LUE; impaired heel to shin LLE; proprioception intact L side  -       User Key  (r) = Recorded By, (t) = Taken By, (c) = Cosigned By    Initials Name Provider Type     Bry King, PT Physical Therapist        Goals/Plan     Row Name 09/22/19 1159          Bed Mobility Goal 1 (PT)    Activity/Assistive Device (Bed Mobility Goal 1, PT)  bed mobility activities, all  -     [place] Level/Cues Needed (Bed Mobility Goal 1, PT)  independent  -     Time Frame (Bed Mobility Goal 1, PT)  by discharge  -     Progress/Outcomes (Bed Mobility Goal 1, PT)  goal ongoing  -LH     Row Name 09/22/19 1159          Transfer Goal 1 (PT)    Activity/Assistive Device (Transfer Goal 1, PT)  sit-to-stand/stand-to-sit;bed-to-chair/chair-to-bed;cane, quad  -     [place] Level/Cues Needed (Transfer Goal 1, PT)  supervision required  -     Time Frame (Transfer Goal 1, PT)  by discharge  -     Progress/Outcome (Transfer Goal 1, PT)  goal ongoing  -LH     Row Name 09/22/19 1159          Gait Training Goal 1 (PT)    Activity/Assistive Device (Gait Training Goal 1, PT)  gait (walking locomotion);assistive device use  -     [place] Level (Gait Training Goal 1, PT)  supervision required  -     Distance (Gait Goal 1, PT)  75 feet  -     Time Frame (Gait Training Goal 1, PT)  by discharge  -      Progress/Outcome (Gait Training Goal 1, PT)  goal ongoing  -     Row Name 09/22/19 1159          Stairs Goal 1 (PT)    Activity/Assistive Device (Stairs Goal 1, PT)  ascending stairs;descending stairs;using handrail, right;using handrail, left;cane, quad  -     [place] Level/Cues Needed (Stairs Goal 1, PT)  contact guard assist  -     Number of Stairs (Stairs Goal 1, PT)  4  -LH     Time Frame (Stairs Goal 1, PT)  by discharge  -     Progress/Outcome (Stairs Goal 1, PT)  goal ongoing  -       User Key  (r) = Recorded By, (t) = Taken By, (c) = Cosigned By    Initials Name Provider Type     Bry King, PT Physical Therapist        Clinical Impression     Row Name 09/22/19 1159          Pain Assessment    Additional Documentation  Pain Scale: Numbers Pre/Post-Treatment (Group)  -     Row Name 09/22/19 1159          Pain Scale: Numbers Pre/Post-Treatment    Pain Scale: Numbers, Pretreatment  8/10  -     Pain Location - Side  Left  -     Pain Location  hand  -     Pain Intervention(s)  Medication (See MAR);Repositioned;Ambulation/increased activity  -     Row Name 09/22/19 1159          Plan of Care Review    Plan of Care Reviewed With  patient  -[facility] Name 09/22/19 1159          Physical Therapy Clinical Impression    Patient/Family Goals Statement (PT Clinical Impression)  impaired mobility  -     Criteria for Skilled Interventions Met (PT Clinical Impression)  yes;treatment indicated  -     Rehab Potential (PT Clinical Summary)  fair, will monitor progress closely  -     Predicted Duration of Therapy (PT)  until dc  -     Row Name 09/22/19 1159          Positioning and Restraints    Pre-Treatment Position  in bed  -     Post Treatment Position  bed  -LH     In Bed  notified nsg;sitting EOB;call light within reach;side rails up x2;encouraged to call for assist  -       User Key  (r) = Recorded By, (t) = Taken By, (c) = Cosigned By    Initials Name Provider Type     Christine  Bry JOYNER, PT Physical Therapist        Outcome Measures     Row Name 09/22/19 1222          How much help from another person do you currently need...    Turning from your back to your side while in flat bed without using bedrails?  4  -LH     Moving from lying on back to sitting on the side of a flat bed without bedrails?  4  -LH     Moving to and from a bed to a chair (including a wheelchair)?  2  -LH     Standing up from a chair using your arms (e.g., wheelchair, bedside chair)?  3  -LH     Climbing 3-5 steps with a railing?  2  -LH     To walk in hospital room?  2  -     AM-PAC 6 Clicks Score (PT)  17  -     Row Name 09/22/19 1222          Modified [place] Scale    Pre-Stroke Modified Nicholas Scale  1 - No significant disability despite symptoms.  Able to carry out all usual duties and activities.  -     Modified [place] Scale  4 - Moderately severe disability.  Unable to walk without assistance, and unable to attend to own bodily needs without assistance.  -     Row Name 09/22/19 1222          Functional Assessment    Outcome Measure Options  AM-PAC 6 Clicks Basic Mobility (PT);Modified Nicholas  -       User Key  (r) = Recorded By, (t) = Taken By, (c) = Cosigned By    Initials Name Provider Type     Bry King, PT Physical Therapist        Physical Therapy Education     Title: PT OT SLP Therapies (Not Started)     Topic: Physical Therapy (Done)     Point: Mobility training (Done)     Learning Progress Summary           Patient Acceptance, E,D, DU,VU by  at 9/22/2019 12:15 PM    Comment:  benefits of PT and POC, call for assist OOB                   Point: Precautions (Done)     Learning Progress Summary           Patient Acceptance, E,D, DU,VU by  at 9/22/2019 12:15 PM    Comment:  benefits of PT and POC, call for assist OOB                               User Key     Initials Effective Dates Name Provider Type [facility] 08/02/16 -  Bry King, PT Physical Therapist PT              PT  "Recommendation and Plan  Planned Therapy Interventions (PT Eval): balance training, bed mobility training, gait training, home exercise program, motor coordination training, patient/family education, strengthening, transfer training  Outcome Summary/Treatment Plan (PT)  Anticipated Discharge Disposition (PT): inpatient rehabilitation facility  Plan of Care Reviewed With: patient  Outcome Summary: PT IE complete.  LLE cool to touch and swollen compared to RLE.  Uncontrolled muscle contractions of LLE/LUE interfering w/ functional mobility.  Pt also hypersensitive to light touch in LUE.  Ambulated a few steps w/ QC then returned to EOB.  Impaired heel to shin LLE.  C/o LLE feeling \"heavy\".  May benefit from acute rehab at [place] pending progress.  Thank you for referral.     Time Calculation:   PT Charges     Row Name 09/22/19 1222             Time Calculation    Start Time  1120  -      Stop Time  1200  -      Time Calculation (min)  40 min  -      PT Received On  09/22/19  -      PT Goal Re-Cert Due Date  10/02/19  -        User Key  (r) = Recorded By, (t) = Taken By, (c) = Cosigned By    Initials Name Provider Type     Bry King, PT Physical Therapist        Therapy Charges for Today     Code Description Service Date Service Provider Modifiers Qty    [id number]  PT EVAL MOD COMPLEXITY 3 9/22/2019 Bry King, PT GP 1          PT G-Codes  Outcome Measure Options: AM-PAC 6 Clicks Basic Mobility (PT), Modified [place]  AM-PAC 6 Clicks Score (PT): 17  Modified Nicholas Scale: 4 - Moderately severe disability.  Unable to walk without assistance, and unable to attend to own bodily needs without assistance.    Bry King PT  9/22/2019       "

## 2019-09-22 NOTE — PROGRESS NOTES
[facility]  INPATIENT PROGRESS NOTE    Length of Stay: 0  Date of Admission: 9/21/2019  Primary Care Physician: Cassandra Cortes APRN    Subjective   Chief Complaint: Myoclonic/history of CVA.    HPI   Left side is much better.  Patient denies any chest pain or shortness of breath.  Coordination is much improved on the left side.    Review of Systems   Constitutional: Positive for activity change, appetite change and fatigue. Negative for chills and fever.   HENT: Negative for hearing loss, nosebleeds, tinnitus and trouble swallowing.    Eyes: Negative for visual disturbance.   Respiratory: Negative for cough, chest tightness, shortness of breath and wheezing.    Cardiovascular: Negative for chest pain, palpitations and leg swelling.   Gastrointestinal: Negative for abdominal distention, abdominal pain, blood in stool, constipation, diarrhea, nausea and vomiting.   Endocrine: Negative for cold intolerance, heat intolerance, polydipsia, polyphagia and polyuria.   Genitourinary: Negative for decreased urine volume, difficulty urinating, dysuria, flank pain, frequency and hematuria.   Musculoskeletal: Positive for arthralgias, gait problem and myalgias. Negative for joint swelling.   Skin: Negative for rash.   Allergic/Immunologic: Negative for immunocompromised state.   Neurological: Positive for weakness. Negative for dizziness, syncope, light-headedness and headaches.   Hematological: Negative for adenopathy. Does not bruise/bleed easily.   Psychiatric/Behavioral: Negative for confusion and sleep disturbance. The patient is not nervous/anxious.           All pertinent negatives and positives are as above. All other systems have been reviewed and are negative unless otherwise stated.     Objective    Temp:  [97.6 °F (36.4 °C)-98.8 °F (37.1 °C)] 97.9 °F (36.6 °C)  Heart Rate:  [61-79] 62  Resp:  [16-18] 18  BP: (107-185)/() 130/80    Intake/Output Summary  (Last 24 hours) at 9/22/2019 0927  Last data filed at 9/22/2019 0832  Gross per 24 hour   Intake --   Output 1225 ml   Net -1225 ml     Physical Exam   Constitutional: He is oriented to person, place, and time. He appears well-developed.   HENT:   Head: Normocephalic and atraumatic.   Eyes: Conjunctivae are normal. Pupils are equal, round, and reactive to light.   Neck: Neck supple. No JVD present. No thyromegaly present.   Cardiovascular: Normal rate, regular rhythm, normal heart sounds and intact distal pulses. Exam reveals no gallop and no friction rub.   No murmur heard.  Pulmonary/Chest: Effort normal and breath sounds normal. No respiratory distress. He has no wheezes. He has no rales. He exhibits no tenderness.   Abdominal: Soft. Bowel sounds are normal. He exhibits no distension. There is no tenderness. There is no rebound and no guarding.   Obesity.   Musculoskeletal: He exhibits no edema, tenderness or deformity.   Left-sided weakness.   Lymphadenopathy:     He has no cervical adenopathy.   Neurological: He is alert and oriented to person, place, and time. He displays normal reflexes. A sensory deficit is present. No cranial nerve deficit. He exhibits normal muscle tone. Coordination abnormal.   Left-sided coordination is much improved.  Strength is also much improved.   Skin: Skin is warm and dry. No rash noted.   Psychiatric: He has a normal mood and affect. His behavior is normal. Judgment and thought content normal.   Nursing note and vitals reviewed.      Results Review:  Lab Results (last 24 hours)     Procedure Component Value Units Date/Time    Hemoglobin A1c [[id number]]  (Abnormal) Collected:  09/22/19 0458    Specimen:  Blood Updated:  09/22/19 0552     Hemoglobin A1C 6.20 %     Narrative:       Hemoglobin A1C Ranges:    Increased Risk for Diabetes  5.7% to 6.4%  Diabetes                     >= 6.5%  Diabetic Goal                < 7.0%    Comprehensive Metabolic Panel [[id number]]  (Abnormal)  Collected:  09/22/19 0458    Specimen:  Blood Updated:  09/22/19 0536     Glucose 102 mg/dL      BUN 15 mg/dL      Creatinine 0.78 mg/dL      Sodium 142 mmol/L      Potassium 4.0 mmol/L      Chloride 104 mmol/L      CO2 27.0 mmol/L      Calcium 9.5 mg/dL      Total Protein 7.0 g/dL      Albumin 4.10 g/dL      ALT (SGPT) 30 U/L      AST (SGOT) 19 U/L      Alkaline Phosphatase 59 U/L      Total Bilirubin 0.5 mg/dL      eGFR Non African Amer 105 mL/min/1.73      Globulin 2.9 gm/dL      A/G Ratio 1.4 g/dL      BUN/Creatinine Ratio 19.2     Anion Gap 11.0 mmol/L     Narrative:       GFR Normal >60  Chronic Kidney Disease <60  Kidney Failure <15    Lipid Panel [[id number]]  (Abnormal) Collected:  09/22/19 0458    Specimen:  Blood Updated:  09/22/19 0536     Total Cholesterol 170 mg/dL      Triglycerides 159 mg/dL      HDL Cholesterol 43 mg/dL      LDL Cholesterol  95 mg/dL      VLDL Cholesterol 31.8 mg/dL      LDL/HDL Ratio 2.21    Narrative:       Cholesterol Reference Ranges  (U.S. Department of Health and Human Services ATP III Classifications)    Desirable          <200 mg/dL  Borderline High    200-239 mg/dL  High Risk          >240 mg/dL      Triglyceride Reference Ranges  (U.S. Department of Health and Human Services ATP III Classifications)    Normal           <150 mg/dL  Borderline High  150-199 mg/dL  High             200-499 mg/dL  Very High        >500 mg/dL    HDL Reference Ranges  (U.S. Department of Health and Human Services ATP III Classifcations)    Low     <40 mg/dl (major risk factor for CHD)  High    >60 mg/dl ('negative' risk factor for CHD)        LDL Reference Ranges  (U.S. Department of Health and Human Services ATP III Classifcations)    Optimal          <100 mg/dL  Near Optimal     100-129 mg/dL  Borderline High  130-159 mg/dL  High             160-189 mg/dL  Very High        >189 mg/dL    TSH [[id number]]  (Normal) Collected:  09/22/19 0458    Specimen:  Blood Updated:  09/22/19 0536     TSH 3.600  uIU/mL     CBC Auto Differential [[id number]]  (Abnormal) Collected:  09/22/19 0458    Specimen:  Blood Updated:  09/22/19 0514     WBC 7.03 10*3/mm3      RBC 4.67 10*6/mm3      Hemoglobin 14.3 g/dL      Hematocrit 41.9 %      MCV 89.7 fL      MCH 30.6 pg      MCHC 34.1 g/dL      RDW 12.5 %      RDW-SD 41.1 fl      MPV 9.5 fL      Platelets 237 10*3/mm3      Neutrophil % 48.9 %      Lymphocyte % 34.6 %      Monocyte % 11.9 %      Eosinophil % 3.0 %      Basophil % 0.6 %      Immature Grans % 1.0 %      Neutrophils, Absolute 3.44 10*3/mm3      Lymphocytes, Absolute 2.43 10*3/mm3      Monocytes, Absolute 0.84 10*3/mm3      Eosinophils, Absolute 0.21 10*3/mm3      Basophils, Absolute 0.04 10*3/mm3      Immature Grans, Absolute 0.07 10*3/mm3      nRBC 0.0 /100 WBC     Ethanol [[id number]] Collected:  09/21/19 1235    Specimen:  Blood Updated:  09/21/19 1822     Ethanol % <0.010 %     Narrative:       Not for legal purposes. Chain of Custody not followed.     Urinalysis With Culture If Indicated - Urine, Clean Catch [[id number]]  (Normal) Collected:  09/21/19 1741    Specimen:  Urine, Clean Catch Updated:  09/21/19 1814     Color, UA Yellow     Appearance, UA Clear     pH, UA 6.0     Specific Gravity, UA 1.011     Glucose, UA Negative     Ketones, UA Negative     Bilirubin, UA Negative     Blood, UA Negative     Protein, UA Negative     Leuk Esterase, UA Negative     Nitrite, UA Negative     Urobilinogen, UA 0.2 E.U./dL    Narrative:       Urine microscopic not indicated.    Urine Drug Screen - Urine, Clean Catch [[id number]]  (Abnormal) Collected:  09/21/19 1741    Specimen:  Urine, Clean Catch Updated:  09/21/19 1803     THC, Screen, Urine Negative     Phencyclidine (PCP), Urine Negative     Cocaine Screen, Urine Negative     Methamphetamine, Ur Negative     Opiate Screen Positive     Amphetamine Screen, Urine Negative     Benzodiazepine Screen, Urine Positive     Tricyclic Antidepressants Screen Negative     Methadone  Screen, Urine Negative     Barbiturates Screen, Urine Negative     Oxycodone Screen, Urine Negative     Propoxyphene Screen Negative     Buprenorphine, Screen, Urine Negative    Narrative:       Cutoff For Drugs Screened:    Amphetamines               500 ng/ml  Barbiturates               200 ng/ml  Benzodiazepines            150 ng/ml  Cocaine                    150 ng/ml  Methadone                  200 ng/ml  Opiates                    100 ng/ml  Phencyclidine               25 ng/ml  THC                            50 ng/ml  Methamphetamine            500 ng/ml  Tricyclic Antidepressants  300 ng/ml  Oxycodone                  100 ng/ml  Propoxyphene               300 ng/ml  Buprenorphine               10 ng/ml    The normal value for all drugs tested is negative. This report includes unconfirmed screening results, with the cutoff values listed, to be used for medical treatment purposes only.  Unconfirmed results must not be used for non-medical purposes such as employment or legal testing.  Clinical consideration should be applied to any drug of abuse test, particularly when unconfirmed results are used.      [place] Draw [[id number]] Collected:  09/21/19 123    Specimen:  Blood Updated:  09/21/19 1345    Narrative:       The following orders were created for panel order [place] Draw.  Procedure                               Abnormality         Status                     ---------                               -----------         ------                     Light Blue Top[[id number]]                                   Final result               Green Top (Gel)[[id number]]                                  Final result               Lavender Top[[id number]]                                     Final result               Red Top[[id number]]                                          Final result                 Please view results for these tests on the individual orders.    Light Blue Top [[id number]] Collected:  09/21/19 6677     Specimen:  Blood Updated:  09/21/19 1345     Extra Tube hold for add-on     Comment: Auto resulted       Green Top (Gel) [[id number]] Collected:  09/21/19 1235    Specimen:  Blood Updated:  09/21/19 1345     Extra Tube Hold for add-ons.     Comment: Auto resulted.       Lavender Top [[id number]] Collected:  09/21/19 1235    Specimen:  Blood Updated:  09/21/19 1345     Extra Tube hold for add-on     Comment: Auto resulted       Red Top [[id number]] Collected:  09/21/19 1235    Specimen:  Blood Updated:  09/21/19 1345     Extra Tube Hold for add-ons.     Comment: Auto resulted.       POC Glucose Once [[id number]]  (Normal) Collected:  09/21/19 1310    Specimen:  Blood Updated:  09/21/19 1322     Glucose 97 mg/dL      Comment: : [id number] Vahid RogersineMeter ID: [id number]       Comprehensive Metabolic Panel [[id number]]  (Abnormal) Collected:  09/21/19 1235    Specimen:  Blood Updated:  09/21/19 1320     Glucose 103 mg/dL      BUN 17 mg/dL      Creatinine 0.82 mg/dL      Sodium 140 mmol/L      Potassium 4.0 mmol/L      Chloride 101 mmol/L      CO2 25.0 mmol/L      Calcium 9.3 mg/dL      Total Protein 7.6 g/dL      Albumin 4.50 g/dL      ALT (SGPT) 34 U/L      AST (SGOT) 22 U/L      Alkaline Phosphatase 69 U/L      Total Bilirubin 0.3 mg/dL      eGFR Non African Amer 99 mL/min/1.73      Globulin 3.1 gm/dL      A/G Ratio 1.5 g/dL      BUN/Creatinine Ratio 20.7     Anion Gap 14.0 mmol/L     Narrative:       GFR Normal >60  Chronic Kidney Disease <60  Kidney Failure <15    Troponin [[id number]]  (Normal) Collected:  09/21/19 1235    Specimen:  Blood Updated:  09/21/19 1320     Troponin T <0.010 ng/mL     Narrative:       Troponin T Reference Range:  <= 0.03 ng/mL-   Negative for AMI  >0.03 ng/mL-     Abnormal for myocardial necrosis.  Clinicians would have to utilize clinical acumen, EKG, Troponin and serial changes to determine if it is an Acute Myocardial Infarction or myocardial injury due to an underlying  chronic condition.     Protime-INR [[id number]]  (Abnormal) Collected:  09/21/19 1235    Specimen:  Blood Updated:  09/21/19 1310     Protime 11.9 Seconds      INR 0.85    aPTT [[id number]]  (Normal) Collected:  09/21/19 1235    Specimen:  Blood Updated:  09/21/19 1310     PTT 31.6 seconds     CBC & Differential [[id number]] Collected:  09/21/19 1235    Specimen:  Blood Updated:  09/21/19 1301    Narrative:       The following orders were created for panel order CBC & Differential.  Procedure                               Abnormality         Status                     ---------                               -----------         ------                     CBC Auto Differential[[id number]]        Abnormal            Final result                 Please view results for these tests on the individual orders.    CBC Auto Differential [[id number]]  (Abnormal) Collected:  09/21/19 1235    Specimen:  Blood Updated:  09/21/19 1301     WBC 8.24 10*3/mm3      RBC 4.95 10*6/mm3      Hemoglobin 15.3 g/dL      Hematocrit 44.6 %      MCV 90.1 fL      MCH 30.9 pg      MCHC 34.3 g/dL      RDW 12.5 %      RDW-SD 41.5 fl      MPV 9.3 fL      Platelets 252 10*3/mm3      Neutrophil % 58.7 %      Lymphocyte % 26.3 %      Monocyte % 10.8 %      Eosinophil % 2.9 %      Basophil % 0.6 %      Immature Grans % 0.7 %      Neutrophils, Absolute 4.83 10*3/mm3      Lymphocytes, Absolute 2.17 10*3/mm3      Monocytes, Absolute 0.89 10*3/mm3      Eosinophils, Absolute 0.24 10*3/mm3      Basophils, Absolute 0.05 10*3/mm3      Immature Grans, Absolute 0.06 10*3/mm3      nRBC 0.0 /100 WBC            Cultures:       Radiology Data:    Imaging Results (last 24 hours)     Procedure Component Value Units Date/Time    CT Head Without Contrast Stroke Protocol [[id number]] Collected:  09/21/19 1339     Updated:  09/21/19 1343    Narrative:       CT HEAD WO CONTRAST STROKE PROTOCOL- 9/21/2019 1:21 PM CDT     HISTORY: Stroke, left-sided numbness.     COMPARISON:  08/08/2019      DOSE LENGTH PRODUCT: 989 mGy cm. Automated exposure control was also  utilized to decrease patient radiation dose.     TECHNIQUE: Helical tomographic images of the brain were obtained without  the use of intravenous contrast.      FINDINGS:   Evolution of the previous RIGHT PCA territory infarct is noted. There is  no evidence of acute hemorrhage. There is no hydrocephalus or abnormal  extra-axial fluid collection. There is volume loss in the medial aspect  of the RIGHT temporal lobe, consistent with evolution of the previously  seen infarct.     The bones are intact. The orbits and their contents are unremarkable.  The visualized sinuses are clear.       Impression:       1. No acute intracranial hemorrhage.  2. Evolution of the previously seen RIGHT PCA territory infarct.  This report was finalized on 09/21/2019 13:40 by Dr. Anant Tilley MD.    XR Chest 1 View [[id number]] Collected:  09/21/19 1306     Updated:  09/21/19 1309    Narrative:       XR CHEST 1 VW- 9/21/2019 12:58 PM CDT     HISTORY: Acute Stroke Protocol (onset < 12 hrs)       COMPARISON: None.     FINDINGS:   The lungs are clear. The cardiomediastinal silhouette and pulmonary  vascularity are within normal limits.      The osseous structures and surrounding soft tissues demonstrate no acute  abnormality.       Impression:       1. No radiographic evidence of acute cardiopulmonary process.        This report was finalized on 09/21/2019 13:06 by Dr. Anant Tilley MD.          No Known Allergies    Scheduled meds:     amLODIPine 10 mg Oral Q24H   aspirin 81 mg Oral Daily   atorvastatin 80 mg Oral Nightly   DULoxetine 30 mg Oral Daily   pantoprazole 40 mg Oral Daily   sodium chloride 10 mL Intravenous Q12H   terazosin 5 mg Oral Daily   traZODone 100 mg Oral Daily       PRN meds:  baclofen  •  sodium chloride    Assessment/Plan       HLD (hyperlipidemia)    Former smoker    Myoclonus    Hx of arterial ischemic stroke    Former  consumption of alcohol      Plan:  Myoclonus.  Consult neurology.  This does not think this is seizure activity.  Patient no acute distress.  Patient talking during the whole episode.  Continue baclofen.  CT scan of the head - no acute intracranial hemorrhage, evolution of the previously seen RIGHT PCA territory infarct.  Chest x-ray-no radiographic evidence of acute cardiopulmonary process.  EEG- normal awake and drowsy EEG.  MRI of the head still pending.     Hypertension/hyperlipidemia.  Continue Norvasc.  Continue Lipitor.     History of CVA.   Continue aspirin.     Depression.  Continue Cymbalta.  Reflux.  Continue Protonix.  Continue trazodone.     Prostate hypertrophy.  Continue Hytrin.     History of alcohol and drug use.  Last use was 2 months ago per patient.  UDS showed positive for benzo and opiate.  Patient was admitted [facility] 1 day previously was given Norco there per patient.  Patient was also given Ativan in the ER.     Discharge Plannin-2 days.  Pending neurology.    Kun Hernandes MD   19   9:27 AM

## 2019-09-23 ENCOUNTER — READMISSION MANAGEMENT (OUTPATIENT)
Dept: CALL CENTER | Facility: HOSPITAL | Age: 49
End: 2019-09-23

## 2019-09-23 NOTE — OUTREACH NOTE
Prep Survey      Responses   Facility patient discharged from?  [facility]   Is patient eligible?  Yes   Discharge diagnosis  Myoclonus   Does the patient have one of the following disease processes/diagnoses(primary or secondary)?  Other   Does the patient have Home health ordered?  No   Is there a DME ordered?  No   Prep survey completed?  Yes          Miranda Colon RN

## 2019-09-23 NOTE — THERAPY DISCHARGE NOTE
Acute Care - Occupational Therapy Discharge Summary  [facility]     Patient Name: Olman Vaca  : 1970  MRN: [id number]    Today's Date: 2019  Onset of Illness/Injury or Date of Surgery: 19    Date of Referral to OT: 19  Referring Physician: Dr. Hernandes      Admit Date: 2019        OT Recommendation and Plan    Visit Dx:    ICD-10-CM ICD-9-CM   1. 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 Ataxia R27.0 781.3   3. Essential hypertension I10 401.9   4. Impaired mobility Z74.09 799.89   5. Decreased activities of daily living (ADL) R68.89 780.99               Rehab Goal Summary     Row Name 19 0700             Transfer Goal 1 (OT)    Activity/Assistive Device (Transfer Goal 1, OT)  toilet;tub  -TS      [place] Level/Cues Needed (Transfer Goal 1, OT)  contact guard assist  -TS      Time Frame (Transfer Goal 1, OT)  long term goal (LTG);10 days  -TS      Progress/Outcome (Transfer Goal 1, OT)  goal not met  -TS         Balance Goal 1 (OT)    Activity/Assistive Device (Balance Goal 1, OT)  standing, dynamic  -TS      [place] Level/Cues Needed (Balance Goal 1, OT)  standby assist  -TS      Time Frame (Balance Goal 1, OT)  long term goal (LTG);10 days  -TS      Progress/Outcomes (Balance Goal 1, OT)  goal not met  -TS         Coordination Goal 1 (OT)    Activity/Assistive Device (Coordination Goal 1, OT)  FM task;GM task  -TS      [place] Level/Cues Needed (Coordination Goal 1, OT)  supervision required  -TS      Time Frame (Coordination Goal 1, OT)  long term goal (LTG);10 days  -TS      Progress/Outcomes (Coordination Goal 1, OT)  goal not met  -TS        User Key  (r) = Recorded By, (t) = Taken By, (c) = Cosigned By    Initials Name Provider Type Discipline    TS Mi Wallace, AGUSTIN/L Occupational Therapy Assistant OT          Outcome Measures     Row Name 19 1130             How much help from another is currently needed...    Putting on and taking off regular lower body  clothing?  3  -AC      Bathing (including washing, rinsing, and drying)  2  -AC      Toileting (which includes using toilet bed pan or urinal)  3  -AC      Putting on and taking off regular upper body clothing  3  -AC      Taking care of personal grooming (such as brushing teeth)  3  -AC      Eating meals  4  -AC      AM-PAC 6 Clicks Score (OT)  18  -AC         Functional Assessment    Outcome Measure Options  AM-PAC 6 Clicks Daily Activity (OT)  -AC        User Key  (r) = Recorded By, (t) = Taken By, (c) = Cosigned By    Initials Name Provider Type    AC Luis M Camacho, OTR/L, CNT Occupational Therapist          Therapy Suggested Charges     Code   Minutes Charges    None                 OT Discharge Summary  Reason for Discharge: Discharge from facility  Outcomes Achieved: Refer to plan of care for updates on goals achieved  Discharge Destination: Home with assist      OMA La  9/23/2019

## 2019-09-23 NOTE — THERAPY DISCHARGE NOTE
Acute Care - Physical Therapy Discharge Summary  [facility]       Patient Name: Olman Vaca  : 1970  MRN: [id number]    Today's Date: 2019  Onset of Illness/Injury or Date of Surgery: 19       Referring Physician: Dr. 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 Ataxia R27.0 781.3   3. Essential hypertension I10 401.9   4. Impaired mobility Z74.09 799.89   5. Decreased activities of daily living (ADL) R68.89 780.99       Outcome Measures     Row Name 19 1130             How much help from another is currently needed...    Putting on and taking off regular lower body clothing?  3  -AC      Bathing (including washing, rinsing, and drying)  2  -AC      Toileting (which includes using toilet bed pan or urinal)  3  -AC      Putting on and taking off regular upper body clothing  3  -AC      Taking care of personal grooming (such as brushing teeth)  3  -AC      Eating meals  4  -AC      AM-PAC 6 Clicks Score (OT)  18  -AC         Functional Assessment    Outcome Measure Options  AM-PAC 6 Clicks Daily Activity (OT)  -AC        User Key  (r) = Recorded By, (t) = Taken By, (c) = Cosigned By    Initials Name Provider Type    Luis M Gastelum, OTR/L, CNT Occupational Therapist              Rehab Goal Summary     Row Name 19 1520 19 0700          Bed Mobility Goal 1 (PT)    Activity/Assistive Device (Bed Mobility Goal 1, PT)  bed mobility activities, all  -NW  --     [place] Level/Cues Needed (Bed Mobility Goal 1, PT)  independent  -NW  --     Time Frame (Bed Mobility Goal 1, PT)  by discharge  -NW  --     Progress/Outcomes (Bed Mobility Goal 1, PT)  goal not met  -NW  --        Transfer Goal 1 (PT)    Activity/Assistive Device (Transfer Goal 1, PT)  sit-to-stand/stand-to-sit;bed-to-chair/chair-to-bed;cane, quad  -NW  --     [place] Level/Cues Needed (Transfer Goal 1, PT)  supervision  required  -NW  --     Time Frame (Transfer Goal 1, PT)  by discharge  -NW  --     Progress/Outcome (Transfer Goal 1, PT)  goal not met  -NW  --        Gait Training Goal 1 (PT)    Activity/Assistive Device (Gait Training Goal 1, PT)  gait (walking locomotion);assistive device use  -NW  --     [place] Level (Gait Training Goal 1, PT)  supervision required  -NW  --     Distance (Gait Goal 1, PT)  75 feet  -NW  --     Time Frame (Gait Training Goal 1, PT)  by discharge  -NW  --     Progress/Outcome (Gait Training Goal 1, PT)  goal not met  -NW  --        Stairs Goal 1 (PT)    Activity/Assistive Device (Stairs Goal 1, PT)  ascending stairs;descending stairs;using handrail, right;using handrail, left;cane, quad  -NW  --     [place] Level/Cues Needed (Stairs Goal 1, PT)  contact guard assist  -NW  --     Number of Stairs (Stairs Goal 1, PT)  4  -NW  --     Time Frame (Stairs Goal 1, PT)  by discharge  -NW  --     Progress/Outcome (Stairs Goal 1, PT)  goal not met  -NW  --        Transfer Goal 1 (OT)    Activity/Assistive Device (Transfer Goal 1, OT)  --  toilet;tub  -TS     [place] Level/Cues Needed (Transfer Goal 1, OT)  --  contact guard assist  -TS     Time Frame (Transfer Goal 1, OT)  --  long term goal (LTG);10 days  -TS     Progress/Outcome (Transfer Goal 1, OT)  --  goal not met  -TS        Balance Goal 1 (OT)    Activity/Assistive Device (Balance Goal 1, OT)  --  standing, dynamic  -TS     [place] Level/Cues Needed (Balance Goal 1, OT)  --  standby assist  -TS     Time Frame (Balance Goal 1, OT)  --  long term goal (LTG);10 days  -TS     Progress/Outcomes (Balance Goal 1, OT)  --  goal not met  -TS        Coordination Goal 1 (OT)    Activity/Assistive Device (Coordination Goal 1, OT)  --  FM task;GM task  -TS     [place] Level/Cues Needed (Coordination Goal 1, OT)  --  supervision required  -TS     Time Frame (Coordination Goal 1, OT)  --  long term goal (LTG);10 days  -TS      Progress/Outcomes (Coordination Goal 1, OT)  --  goal not met  -HERNAN       User Key  (r) = Recorded By, (t) = Taken By, (c) = Cosigned By    Initials Name Provider Type Discipline    TS Mi Wallace, AGUSTIN/L Occupational Therapy Assistant OT    NW Janae Aguilar, KATHRIN Physical Therapy Assistant PT              PT Discharge Summary  Anticipated Discharge Disposition (PT): home  Reason for Discharge: Discharge from facility  Outcomes Achieved: Discharge from facility occurred on same date as evluation  Discharge Destination: Home      Janae Aguilar PTA   9/23/2019

## 2019-09-25 ENCOUNTER — READMISSION MANAGEMENT (OUTPATIENT)
Dept: CALL CENTER | Facility: HOSPITAL | Age: 49
End: 2019-09-25

## 2019-09-25 ENCOUNTER — OFFICE VISIT (OUTPATIENT)
Dept: NEUROLOGY | Facility: CLINIC | Age: 49
End: 2019-09-25

## 2019-09-25 VITALS
WEIGHT: 218 LBS | SYSTOLIC BLOOD PRESSURE: 120 MMHG | HEIGHT: 67 IN | BODY MASS INDEX: 34.21 KG/M2 | HEART RATE: 66 BPM | DIASTOLIC BLOOD PRESSURE: 80 MMHG

## 2019-09-25 DIAGNOSIS — G89.0 THALAMIC PAIN SYNDROME: ICD-10-CM

## 2019-09-25 PROCEDURE — 99214 OFFICE O/P EST MOD 30 MIN: CPT | Performed by: PHYSICIAN ASSISTANT

## 2019-09-25 RX ORDER — DULOXETIN HYDROCHLORIDE 60 MG/1
60 CAPSULE, DELAYED RELEASE ORAL DAILY
Qty: 30 CAPSULE | Refills: 3 | Status: SHIPPED | OUTPATIENT
Start: 2019-09-25 | End: 2020-04-22

## 2019-09-25 RX ORDER — BACLOFEN 10 MG/1
10 TABLET ORAL 3 TIMES DAILY
Qty: 90 TABLET | Refills: 3 | Status: SHIPPED | OUTPATIENT
Start: 2019-09-25

## 2019-09-25 RX ORDER — PROCHLORPERAZINE MALEATE 10 MG
10 TABLET ORAL EVERY 6 HOURS PRN
Qty: 30 TABLET | Refills: 0 | Status: SHIPPED | OUTPATIENT
Start: 2019-09-25

## 2019-09-25 NOTE — PROGRESS NOTES
Raheel Vaca is a 49 y.o. male is here today for follow-up.    Acute right PCA stroke 8 August 2019.  Associated residual left homonymous hemianopia, left hemiapraxia, left thalamic discomfort/sensory changes.      Stroke   This is a new problem. The current episode started 1 to 4 weeks ago. The problem occurs constantly. The problem has been unchanged. Associated symptoms include headaches, nausea, numbness, vertigo, a visual change, vomiting and weakness. The symptoms are aggravated by bending, exertion and walking. He has tried lying down, position changes, sleep, rest and relaxation for the symptoms. The treatment provided mild relief.        The following portions of the patient's history were reviewed and updated as appropriate: allergies, current medications, past family history, past medical history, past social history, past surgical history and problem list.    Review of Systems   HENT: Positive for voice change. Negative for trouble swallowing.    Eyes: Positive for visual disturbance.   Respiratory: Negative.    Cardiovascular: Negative.    Gastrointestinal: Positive for nausea and vomiting.   Endocrine: Negative.    Genitourinary: Negative.    Musculoskeletal: Negative.    Skin: Negative.    Allergic/Immunologic: Negative.    Neurological: Positive for dizziness, vertigo, weakness, numbness, headache and memory problem. Negative for seizures, facial asymmetry and speech difficulty.   Hematological: Negative.    Psychiatric/Behavioral: Positive for dysphoric mood and sleep disturbance. The patient is nervous/anxious.        Objective   Physical Exam   Constitutional: He is oriented to person, place, and time.   HENT:   Head: Normocephalic.   Right Ear: Hearing and external ear normal.   Left Ear: Hearing and external ear normal.   Nose: Nose normal.   Mouth/Throat: Uvula is midline, oropharynx is clear and moist and mucous membranes are normal.   Eyes: Conjunctivae, EOM and lids are  normal. Pupils are equal, round, and reactive to light.   Neck: Trachea normal, normal range of motion and phonation normal. No JVD present. Carotid bruit is not present.   Cardiovascular: Normal rate, regular rhythm and normal heart sounds.   No murmur heard.  Pulmonary/Chest: Effort normal and breath sounds normal.   Neurological: He is alert and oriented to person, place, and time. He displays no atrophy and no tremor. A cranial nerve deficit and sensory deficit is present. He exhibits abnormal muscle tone. Coordination and gait abnormal. GCS eye subscore is 4. GCS verbal subscore is 5. GCS motor subscore is 6.   Reflex Scores:       Tricep reflexes are 2+ on the right side and 2+ on the left side.       Bicep reflexes are 2+ on the right side and 2+ on the left side.       Brachioradialis reflexes are 2+ on the right side and 2+ on the left side.       Patellar reflexes are 2+ on the right side and 2+ on the left side.       Achilles reflexes are 2+ on the right side and 2+ on the left side.  Left homonymous hemianopia, left hemiapraxia with weakness   Skin: Skin is warm, dry and intact.   Psychiatric: He has a normal mood and affect. Judgment and thought content normal. His speech is delayed. He is slowed. Cognition and memory are normal.   Nursing note and vitals reviewed.        Assessment/Plan   Olman was seen today for stroke.    Diagnoses and all orders for this visit:    Cerebrovascular accident (CVA) due to thrombosis of precerebral artery (CMS/HCC)    Left hemiparesis (CMS/HCC)    Thalamic pain syndrome  - DULoxetine (CYMBALTA) 30 MG capsule; Take 1 capsule by mouth Daily.    Homonymous hemianopia, left    Berry aneurysm    Extensive review of the patient's hospital findings as well as discussion of his questions and concerns.  Recommend continuation of Lipitor 80 mg at the present time.  Recommend continuation of aspirin.  His blood pressure was acceptable today.  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  Recommended that he continue with therapy and be followed along expectantly.      20 minutes of a 25 minute outpatient visit was spent in counseling and coordination of care today.           Dictated utilizing Dragon dictation.

## 2019-09-25 NOTE — OUTREACH NOTE
Medical Week 1 Survey      Responses   Facility patient discharged from?  [facility]   Does the patient have one of the following disease processes/diagnoses(primary or secondary)?  Other   Is there a successful TCM telephone encounter documented?  No   Week 1 attempt successful?  Yes   Call start time  1037   Call end time  1041   Discharge diagnosis  Myoclonus   Is patient permission given to speak with other caregiver?  Yes   List who call center can speak with  Mother, Ame Lundberg reviewed with patient/caregiver?  Yes   Is the patient having any side effects they believe may be caused by any medication additions or changes?  No   Does the patient have all medications ordered at discharge?  Yes   Is the patient taking all medications as directed (includes completed medication regime)?  Yes   Does the patient have a primary care provider?   Yes   Does the patient have an appointment with their PCP within 7 days of discharge?  Yes   Comments  PCP yesterday   Has home health visited the patient within 72 hours of discharge?  N/A   Has all DME been delivered?  No   Psychosocial issues?  No   Did the patient receive a copy of their discharge instructions?  Yes   Nursing interventions  Reviewed instructions with patient, Educated on MyChart   What is the patient's perception of their health status since discharge?  Improving   Is the patient/caregiver able to teach back the hierarchy of who to call/visit for symptoms/problems? PCP, Specialist, Home health nurse, Urgent Care, ED, 911  Yes   Week 1 call completed?  Yes          Vickie Cheung RN

## 2019-09-25 NOTE — PROGRESS NOTES
Raheel Vaca is a 49 y.o. male is here today for follow-up.    Acute right PCA stroke 8 August 2019.  Associated residual left homonymous hemianopia, left hemiapraxia, left thalamic discomfort/sensory changes.    Interval history the patient was readmitted with development of more spastic/myoclonus complaints of the left side.  This responded to baclofen and he has been continuing with this.      Stroke   This is a chronic problem. The current episode started more than 1 month ago. The problem occurs constantly. The problem has been unchanged. Associated symptoms include fatigue, headaches, nausea, numbness, vertigo, a visual change, vomiting and weakness. The symptoms are aggravated by bending, exertion and walking. He has tried lying down, position changes, sleep, rest and relaxation for the symptoms. The treatment provided mild relief.        The following portions of the patient's history were reviewed and updated as appropriate: allergies, current medications, past family history, past medical history, past social history, past surgical history and problem list.    Review of Systems   Constitutional: Positive for fatigue.   HENT: Positive for voice change. Negative for trouble swallowing.    Eyes: Positive for visual disturbance.   Respiratory: Negative.    Cardiovascular: Negative.    Gastrointestinal: Positive for nausea and vomiting.   Endocrine: Negative.    Genitourinary: Negative.    Musculoskeletal: Negative.    Skin: Negative.    Allergic/Immunologic: Negative.    Neurological: Positive for dizziness, vertigo, weakness, numbness, headache and memory problem. Negative for seizures, facial asymmetry and speech difficulty.   Hematological: Negative.    Psychiatric/Behavioral: Positive for dysphoric mood and sleep disturbance. The patient is nervous/anxious.        Objective   Physical Exam   Constitutional: He is oriented to person, place, and time.   HENT:   Head: Normocephalic.   Right  Ear: Hearing and external ear normal.   Left Ear: Hearing and external ear normal.   Nose: Nose normal.   Mouth/Throat: Uvula is midline, oropharynx is clear and moist and mucous membranes are normal.   Eyes: Conjunctivae, EOM and lids are normal. Pupils are equal, round, and reactive to light.   Neck: Trachea normal, normal range of motion and phonation normal. No JVD present. Carotid bruit is not present.   Cardiovascular: Normal rate, regular rhythm and normal heart sounds.   No murmur heard.  Pulmonary/Chest: Effort normal and breath sounds normal.   Neurological: He is alert and oriented to person, place, and time. He displays no atrophy and no tremor. A cranial nerve deficit and sensory deficit is present. He exhibits abnormal muscle tone. Coordination and gait abnormal. GCS eye subscore is 4. GCS verbal subscore is 5. GCS motor subscore is 6.   Reflex Scores:       Tricep reflexes are 2+ on the right side and 2+ on the left side.       Bicep reflexes are 2+ on the right side and 2+ on the left side.       Brachioradialis reflexes are 2+ on the right side and 2+ on the left side.       Patellar reflexes are 2+ on the right side and 2+ on the left side.       Achilles reflexes are 2+ on the right side and 2+ on the left side.  Left homonymous hemianopia, left hemiapraxia with weakness   Skin: Skin is warm, dry and intact.   Psychiatric: He has a normal mood and affect. Judgment and thought content normal. His speech is delayed. He is slowed. Cognition and memory are normal.   Nursing note and vitals reviewed.        Assessment/Plan   Olman was seen today for stroke and headache.    Diagnoses and all orders for this visit:    Thalamic pain syndrome  -     DULoxetine (CYMBALTA) 60 MG capsule; Take 1 capsule by mouth Daily.  -     baclofen (LIORESAL) 10 MG tablet; Take 1 tablet by mouth 3 (Three) Times a Day.  -     prochlorperazine (COMPAZINE) 10 MG tablet; Take 1 tablet by mouth Every 6 (Six) Hours As Needed for  Nausea or Vomiting.    Increase duloxetine to 60 mg once daily, recommend that he continue baclofen and use this 3 times per day for his evolving spasticity.  Episodic use of Compazine for nausea associated with his vertigo is okay.  He can can decrease Lipitor to 40 mg/day.  He should continue in physical therapy.      20 minutes of a 25 minute outpatient visit was spent in counseling and coordination of care today.           Dictated utilizing Dragon dictation.

## 2020-01-03 NOTE — DISCHARGE PLACEMENT REQUEST
"Star Vaca (49 y.o. Male)     Date of Birth Social Security Number Address Home Phone MRN    1970  [address] [facility] [id number]  [id number]    Oriental orthodox Marital Status          Latter day Single       Admission Date Admission Type Admitting Provider Attending Provider Department, Room/Bed    8/8/19 Urgent Tay Collado DO Robinson, Maurice S, DO [facility] 3A, 354/1    Discharge Date Discharge Disposition Discharge Destination                       Attending Provider:  Tay Collado DO    Allergies:  No Known Allergies    Isolation:  None   Infection:  None   Code Status:  CPR    Ht:  172 cm (67.72\")   Wt:  105 kg (231 lb 7.7 oz)    Admission Cmt:  None   Principal Problem:  CVA (cerebral vascular accident) (CMS/[facility]) [I63.9]                 Active Insurance as of 8/8/2019     Primary Coverage     Payor Plan Insurance Group Employer/Plan Group    MEDICAID PENDING MEDICAID PENDING      Payor Plan Address Payor Plan Phone Number Payor Plan Fax Number Effective Dates    [facility]   8/8/2019 - None Entered    Subscriber Name Subscriber Birth Date Member ID       STAR VACA 1970 PENDING                 Emergency Contacts      (Rel.) Home Phone Work Phone Mobile Phone    STEPHAN SHARITA (Father) -- -- [phone]              " Yes

## 2020-04-22 DIAGNOSIS — G89.0 THALAMIC PAIN SYNDROME: ICD-10-CM

## 2020-04-22 RX ORDER — DULOXETIN HYDROCHLORIDE 60 MG/1
60 CAPSULE, DELAYED RELEASE ORAL DAILY
Qty: 30 CAPSULE | Refills: 0 | Status: SHIPPED | OUTPATIENT
Start: 2020-04-22

## 2024-11-27 NOTE — PLAN OF CARE
Problem: Patient Care Overview  Goal: Plan of Care Review  Outcome: Ongoing (interventions implemented as appropriate)   08/11/19 1208   Coping/Psychosocial   Plan of Care Reviewed With patient   Plan of Care Review   Progress no change   OTHER   Outcome Summary pt up in bathroom upon entering room, amb 50 feet to rehab room with rwx cga-min assist,cues for proper placement of LLE with gait, worked on LLE coordination sitting edge of mat table, also worked in parallel bars with mirror for proper foot placement, L knee did buckle with fatigue min assist to correct. pt would benefit from rehab          Lauro Gonzalez  Plastic Surgery  [address] [facility]  [place] [address]  Phone: [phone]  Fax: [phone]  Follow Up Time: 4-6 Days